# Patient Record
Sex: FEMALE | Race: WHITE | URBAN - METROPOLITAN AREA
[De-identification: names, ages, dates, MRNs, and addresses within clinical notes are randomized per-mention and may not be internally consistent; named-entity substitution may affect disease eponyms.]

---

## 2022-02-03 LAB — MAMMOGRAPHY, EXTERNAL: NORMAL

## 2022-02-13 LAB — PAP SMEAR, EXTERNAL: NORMAL

## 2022-08-05 RX ORDER — ERGOCALCIFEROL 1.25 MG/1
50000 CAPSULE ORAL WEEKLY
Qty: 12 CAPSULE | Refills: 1 | Status: SHIPPED | OUTPATIENT
Start: 2022-08-05

## 2022-08-10 RX ORDER — METOPROLOL SUCCINATE 50 MG/1
TABLET, EXTENDED RELEASE ORAL
Qty: 90 TABLET | Refills: 0 | Status: SHIPPED | OUTPATIENT
Start: 2022-08-10

## 2022-08-10 RX ORDER — ROSUVASTATIN CALCIUM 10 MG/1
TABLET, COATED ORAL
COMMUNITY
Start: 2022-07-21 | End: 2022-08-10 | Stop reason: SDUPTHER

## 2022-08-10 RX ORDER — METOPROLOL SUCCINATE 50 MG/1
TABLET, EXTENDED RELEASE ORAL
COMMUNITY
Start: 2022-07-08 | End: 2022-08-10 | Stop reason: SDUPTHER

## 2022-08-10 RX ORDER — ROSUVASTATIN CALCIUM 10 MG/1
TABLET, COATED ORAL
Qty: 90 TABLET | Refills: 0 | Status: SHIPPED
Start: 2022-08-10 | End: 2022-08-24

## 2022-08-10 NOTE — TELEPHONE ENCOUNTER
Refills requested.   Metoprolol 50mg One tab daily  Crestor  One tab daily (Pt unsure of strength)  Last seen Visit date not found  Next appt 8/24/2022  [address]

## 2022-08-24 ENCOUNTER — OFFICE VISIT (OUTPATIENT)
Dept: PRIMARY CARE CLINIC | Age: 62
End: 2022-08-24
Payer: COMMERCIAL

## 2022-08-24 VITALS
WEIGHT: 116.9 LBS | DIASTOLIC BLOOD PRESSURE: 70 MMHG | SYSTOLIC BLOOD PRESSURE: 126 MMHG | TEMPERATURE: 97.1 F | HEIGHT: 66 IN | HEART RATE: 70 BPM | BODY MASS INDEX: 18.79 KG/M2 | OXYGEN SATURATION: 97 %

## 2022-08-24 DIAGNOSIS — E78.2 MIXED HYPERLIPIDEMIA: ICD-10-CM

## 2022-08-24 DIAGNOSIS — I10 PRIMARY HYPERTENSION: Primary | ICD-10-CM

## 2022-08-24 DIAGNOSIS — E55.9 VITAMIN D DEFICIENCY: ICD-10-CM

## 2022-08-24 PROCEDURE — 99214 OFFICE O/P EST MOD 30 MIN: CPT | Performed by: INTERNAL MEDICINE

## 2022-08-24 RX ORDER — PHENOL 1.4 %
1 AEROSOL, SPRAY (ML) MUCOUS MEMBRANE DAILY
COMMUNITY

## 2022-08-24 RX ORDER — LANOLIN ALCOHOL/MO/W.PET/CERES
1 CREAM (GRAM) TOPICAL DAILY
COMMUNITY

## 2022-08-24 RX ORDER — VITAMIN B COMPLEX
1 CAPSULE ORAL DAILY
COMMUNITY
End: 2022-08-24

## 2022-08-24 RX ORDER — PREDNISOLONE ACETATE 10 MG/ML
1 SUSPENSION/ DROPS OPHTHALMIC DAILY
COMMUNITY

## 2022-08-24 RX ORDER — ASPIRIN 81 MG/1
81 TABLET ORAL DAILY
COMMUNITY

## 2022-08-24 RX ORDER — ASCORBIC ACID 500 MG
1 TABLET ORAL DAILY
COMMUNITY

## 2022-08-24 RX ORDER — ROSUVASTATIN CALCIUM 10 MG/1
1 TABLET, COATED ORAL DAILY
COMMUNITY

## 2022-08-24 RX ORDER — MAGNESIUM 200 MG
1 TABLET ORAL DAILY
COMMUNITY
End: 2022-08-24

## 2022-08-24 SDOH — ECONOMIC STABILITY: FOOD INSECURITY: WITHIN THE PAST 12 MONTHS, THE FOOD YOU BOUGHT JUST DIDN'T LAST AND YOU DIDN'T HAVE MONEY TO GET MORE.: NEVER TRUE

## 2022-08-24 SDOH — ECONOMIC STABILITY: FOOD INSECURITY: WITHIN THE PAST 12 MONTHS, YOU WORRIED THAT YOUR FOOD WOULD RUN OUT BEFORE YOU GOT MONEY TO BUY MORE.: NEVER TRUE

## 2022-08-24 ASSESSMENT — ENCOUNTER SYMPTOMS
BACK PAIN: 0
APNEA: 0
NAUSEA: 0
CONSTIPATION: 0
SORE THROAT: 0
TROUBLE SWALLOWING: 0
EYE ITCHING: 0
VOMITING: 0
PHOTOPHOBIA: 0
WHEEZING: 0
BLOOD IN STOOL: 0
DIARRHEA: 0
SHORTNESS OF BREATH: 0
ABDOMINAL DISTENTION: 0
COUGH: 0
SINUS PAIN: 0
ABDOMINAL PAIN: 0
EYE DISCHARGE: 0

## 2022-08-24 ASSESSMENT — PATIENT HEALTH QUESTIONNAIRE - PHQ9
SUM OF ALL RESPONSES TO PHQ9 QUESTIONS 1 & 2: 0
SUM OF ALL RESPONSES TO PHQ QUESTIONS 1-9: 0
2. FEELING DOWN, DEPRESSED OR HOPELESS: 0
SUM OF ALL RESPONSES TO PHQ QUESTIONS 1-9: 0
1. LITTLE INTEREST OR PLEASURE IN DOING THINGS: 0
SUM OF ALL RESPONSES TO PHQ QUESTIONS 1-9: 0
SUM OF ALL RESPONSES TO PHQ QUESTIONS 1-9: 0

## 2022-08-24 ASSESSMENT — SOCIAL DETERMINANTS OF HEALTH (SDOH): HOW HARD IS IT FOR YOU TO PAY FOR THE VERY BASICS LIKE FOOD, HOUSING, MEDICAL CARE, AND HEATING?: NOT HARD AT ALL

## 2022-08-24 NOTE — PROGRESS NOTES
Rosina Crook (:  1960) is a 60 y.o. female,Established patient, here for evaluation of the following chief complaint(s):  Check-Up      Subjective   SUBJECTIVE/OBJECTIVE:  HPI:  1)Hypertension:  Patient is here for follow up chronic hypertension. This is  generally controlled on current medication regimen. BP Readings from Last 1 Encounters:   22 126/70        Takes meds as directed and tolerates them well. Most recent labs reviewed with patient and are not remarkable. No symptoms from htn standpoint per ROS. Patient is  compliant with lifestyle modifications. Patient does  smoke. Comorbid conditions include none   2)Hyperlipidemia:  Patient is here to follow up regarding chronic hyperlipidemia. This is  generally controlled. Treatment includes rousvastatin. Patient is  compliant with lifestyle modifications. Patient is not a smoker. Most recent labs reviewed with patient today and are not remarkable. Comorbid conditions include none          Review of Systems   Constitutional:  Negative for activity change, appetite change, fever and unexpected weight change. HENT:  Negative for congestion, ear pain, hearing loss, sinus pain, sore throat, tinnitus and trouble swallowing. Eyes:  Negative for photophobia, discharge, itching and visual disturbance. Respiratory:  Negative for apnea, cough, shortness of breath and wheezing. Cardiovascular:  Negative for chest pain, palpitations and leg swelling. Gastrointestinal:  Negative for abdominal distention, abdominal pain, blood in stool, constipation, diarrhea, nausea and vomiting. Endocrine: Negative for cold intolerance, polydipsia and polyuria. Genitourinary:  Negative for difficulty urinating, dysuria, frequency and pelvic pain. Musculoskeletal:  Negative for arthralgias, back pain, joint swelling, myalgias, neck pain and neck stiffness. Skin:  Negative for rash and wound.    Neurological:  Negative for dizziness, tremors, syncope, light-headedness and headaches. Objective   /70   Pulse 70   Temp 97.1 °F (36.2 °C) (Temporal)   Ht 5' 6\" (1.676 m)   Wt 116 lb 14.4 oz (53 kg)   SpO2 97%   BMI 18.87 kg/m²   Current Outpatient Medications   Medication Sig Dispense Refill    aspirin 81 MG EC tablet Take 81 mg by mouth daily      calcium carbonate 600 MG TABS tablet Take 1 tablet by mouth daily      rosuvastatin (CRESTOR) 10 MG tablet Take 1 tablet by mouth daily      vitamin C (ASCORBIC ACID) 500 MG tablet Take 1 tablet by mouth daily      vitamin B-12 (CYANOCOBALAMIN) 1000 MCG tablet Take 1 tablet by mouth daily      prednisoLONE acetate (PRED FORTE) 1 % ophthalmic suspension 1 drop daily      metoprolol succinate (TOPROL XL) 50 MG extended release tablet TAKE 1 TABLET BY MOUTH ONCE DAILY 90 tablet 0    vitamin D (ERGOCALCIFEROL) 1.25 MG (88980 UT) CAPS capsule Take 1 capsule by mouth once a week 12 capsule 1     No current facility-administered medications for this visit. No Known Allergies     History reviewed. No pertinent past medical history. History reviewed. No pertinent surgical history.   Family History   Problem Relation Age of Onset    Heart Disease Mother     Other Father          from surgical complications      Social History     Socioeconomic History    Marital status: Unknown     Spouse name: Not on file    Number of children: Not on file    Years of education: Not on file    Highest education level: Not on file   Occupational History    Not on file   Tobacco Use    Smoking status: Every Day     Packs/day: 0.50     Years: 40.00     Pack years: 20.00     Types: Cigarettes    Smokeless tobacco: Never   Substance and Sexual Activity    Alcohol use: Not on file    Drug use: Not on file    Sexual activity: Not on file   Other Topics Concern    Not on file   Social History Narrative    Not on file     Social Determinants of Health     Financial Resource Strain: Low Risk     Difficulty of Paying Living Expenses: Not hard at all   Food Insecurity: No Food Insecurity    Worried About Running Out of Food in the Last Year: Never true    Ran Out of Food in the Last Year: Never true   Transportation Needs: Not on file   Physical Activity: Not on file   Stress: Not on file   Social Connections: Not on file   Intimate Partner Violence: Not on file   Housing Stability: Not on file      Health Maintenance Due   Topic Date Due    COVID-19 Vaccine (1) Never done    Depression Screen  Never done    HIV screen  Never done    Hepatitis C screen  Never done    DTaP/Tdap/Td vaccine (1 - Tdap) Never done    Cervical cancer screen  Never done    Lipids  Never done    Colorectal Cancer Screen  Never done    Breast cancer screen  Never done    Shingles vaccine (1 of 2) Never done    Low dose CT lung screening  Never done        Physical Exam  Constitutional:       Appearance: Normal appearance. She is normal weight. HENT:      Head: Normocephalic and atraumatic. Right Ear: Tympanic membrane and ear canal normal.      Left Ear: Tympanic membrane and ear canal normal.      Nose: Nose normal.      Mouth/Throat:      Mouth: Mucous membranes are moist.      Pharynx: Oropharynx is clear. Eyes:      Extraocular Movements: Extraocular movements intact. Conjunctiva/sclera: Conjunctivae normal.      Pupils: Pupils are equal, round, and reactive to light. Cardiovascular:      Rate and Rhythm: Normal rate and regular rhythm. Pulses: Normal pulses. Heart sounds: Normal heart sounds. Pulmonary:      Effort: Pulmonary effort is normal.      Breath sounds: Normal breath sounds. Abdominal:      General: Abdomen is flat. Bowel sounds are normal.      Palpations: Abdomen is soft. Musculoskeletal:         General: Normal range of motion.       Right shoulder: Normal.      Left shoulder: Normal.      Right upper arm: Normal.      Left upper arm: Normal.      Right elbow: Normal.      Left elbow: Normal.      Right forearm: Normal.      Left forearm: Normal.      Right wrist: Normal.      Left wrist: Normal.      Right hand: Normal.      Left hand: Normal.      Cervical back: Normal, normal range of motion and neck supple. Lumbar back: Normal.   Skin:     General: Skin is warm and dry. Neurological:      General: No focal deficit present. Mental Status: She is alert and oriented to person, place, and time. Mental status is at baseline. Psychiatric:         Mood and Affect: Mood normal.         Behavior: Behavior normal.         Thought Content: Thought content normal.         Judgment: Judgment normal.             ASSESSMENT/PLAN:  1. Primary hypertension  -     CBC with Auto Differential; Future  2. Mixed hyperlipidemia  -     T4, Free; Future  -     TSH; Future  -     Comprehensive Metabolic Panel; Future  -     Lipid, Fasting; Future  3. Vitamin D deficiency    Return in about 6 months (around 2/24/2023). An electronic signature was used to authenticate this note.     --Brendon Gaspar MD

## 2022-11-10 RX ORDER — METOPROLOL SUCCINATE 50 MG/1
TABLET, EXTENDED RELEASE ORAL
Qty: 90 TABLET | Refills: 0 | Status: SHIPPED
Start: 2022-11-10 | End: 2022-11-10

## 2022-11-10 RX ORDER — METOPROLOL SUCCINATE 50 MG/1
TABLET, EXTENDED RELEASE ORAL
Qty: 90 TABLET | Refills: 0 | Status: SHIPPED | OUTPATIENT
Start: 2022-11-10

## 2022-11-12 ENCOUNTER — HOSPITAL ENCOUNTER (OUTPATIENT)
Age: 62
Discharge: HOME | End: 2022-11-12
Payer: COMMERCIAL

## 2022-11-12 DIAGNOSIS — E78.2 MIXED HYPERLIPIDEMIA: ICD-10-CM

## 2022-11-12 DIAGNOSIS — I10 PRIMARY HYPERTENSION: ICD-10-CM

## 2022-11-12 LAB
ALBUMIN SERPL-MCNC: 4.3 G/DL (ref 3.5–5.2)
ALP BLD-CCNC: 87 U/L (ref 35–104)
ALT SERPL-CCNC: 13 U/L (ref 0–32)
ANION GAP SERPL CALCULATED.3IONS-SCNC: 7 MMOL/L (ref 7–16)
AST SERPL-CCNC: 16 U/L (ref 0–31)
BASOPHILS ABSOLUTE: 0.04 E9/L (ref 0–0.2)
BASOPHILS RELATIVE PERCENT: 0.6 % (ref 0–2)
BILIRUB SERPL-MCNC: 0.8 MG/DL (ref 0–1.2)
BUN BLDV-MCNC: 13 MG/DL (ref 6–23)
CALCIUM SERPL-MCNC: 9.3 MG/DL (ref 8.6–10.2)
CHLORIDE BLD-SCNC: 102 MMOL/L (ref 98–107)
CHOLESTEROL, FASTING: 160 MG/DL (ref 0–199)
CO2: 29 MMOL/L (ref 22–29)
CREAT SERPL-MCNC: 0.8 MG/DL (ref 0.5–1)
EOSINOPHILS ABSOLUTE: 0.11 E9/L (ref 0.05–0.5)
EOSINOPHILS RELATIVE PERCENT: 1.6 % (ref 0–6)
GFR SERPL CREATININE-BSD FRML MDRD: >60 ML/MIN/1.73
GLUCOSE BLD-MCNC: 105 MG/DL (ref 74–99)
HCT VFR BLD CALC: 42.6 % (ref 34–48)
HDLC SERPL-MCNC: 49 MG/DL
HEMOGLOBIN: 13.8 G/DL (ref 11.5–15.5)
IMMATURE GRANULOCYTES #: 0.03 E9/L
IMMATURE GRANULOCYTES %: 0.4 % (ref 0–5)
LDL CHOLESTEROL CALCULATED: 96 MG/DL (ref 0–99)
LYMPHOCYTES ABSOLUTE: 1.17 E9/L (ref 1.5–4)
LYMPHOCYTES RELATIVE PERCENT: 17.5 % (ref 20–42)
MCH RBC QN AUTO: 31 PG (ref 26–35)
MCHC RBC AUTO-ENTMCNC: 32.4 % (ref 32–34.5)
MCV RBC AUTO: 95.7 FL (ref 80–99.9)
MONOCYTES ABSOLUTE: 0.58 E9/L (ref 0.1–0.95)
MONOCYTES RELATIVE PERCENT: 8.7 % (ref 2–12)
NEUTROPHILS ABSOLUTE: 4.77 E9/L (ref 1.8–7.3)
NEUTROPHILS RELATIVE PERCENT: 71.2 % (ref 43–80)
PDW BLD-RTO: 12.6 FL (ref 11.5–15)
PLATELET # BLD: 233 E9/L (ref 130–450)
PMV BLD AUTO: 9.5 FL (ref 7–12)
POTASSIUM SERPL-SCNC: 4.5 MMOL/L (ref 3.5–5)
RBC # BLD: 4.45 E12/L (ref 3.5–5.5)
SODIUM BLD-SCNC: 138 MMOL/L (ref 132–146)
T4 FREE: 1.15 NG/DL (ref 0.93–1.7)
TOTAL PROTEIN: 7.3 G/DL (ref 6.4–8.3)
TRIGLYCERIDE, FASTING: 77 MG/DL (ref 0–149)
TSH SERPL DL<=0.05 MIU/L-ACNC: 1.4 UIU/ML (ref 0.27–4.2)
VLDLC SERPL CALC-MCNC: 15 MG/DL
WBC # BLD: 6.7 E9/L (ref 4.5–11.5)

## 2022-11-12 PROCEDURE — 80061 LIPID PANEL: CPT

## 2022-11-12 PROCEDURE — 85025 COMPLETE CBC W/AUTO DIFF WBC: CPT

## 2022-11-12 PROCEDURE — 84443 ASSAY THYROID STIM HORMONE: CPT

## 2022-11-12 PROCEDURE — 36415 COLL VENOUS BLD VENIPUNCTURE: CPT

## 2022-11-12 PROCEDURE — 80053 COMPREHEN METABOLIC PANEL: CPT

## 2022-11-12 PROCEDURE — 84439 ASSAY OF FREE THYROXINE: CPT

## 2022-11-21 RX ORDER — ROSUVASTATIN CALCIUM 10 MG/1
10 TABLET, COATED ORAL DAILY
Qty: 90 TABLET | Refills: 0 | Status: SHIPPED | OUTPATIENT
Start: 2022-11-21 | End: 2023-02-19

## 2022-12-01 ENCOUNTER — TELEPHONE (OUTPATIENT)
Dept: PRIMARY CARE CLINIC | Age: 62
End: 2022-12-01

## 2022-12-01 NOTE — TELEPHONE ENCOUNTER
Pt tested positive for covid last night. She cough and congestion. Running nose, achey, and nausea. Is there anything besides the regular vitamin list?  Please advise. Per you she is taking advil cold and sinus. [address] [facility].

## 2022-12-02 ENCOUNTER — TELEPHONE (OUTPATIENT)
Dept: PRIMARY CARE CLINIC | Age: 62
End: 2022-12-02

## 2023-01-16 ASSESSMENT — ENCOUNTER SYMPTOMS
SHORTNESS OF BREATH: 0
PHOTOPHOBIA: 0
ABDOMINAL PAIN: 0
BACK PAIN: 0
SINUS PAIN: 0
EYE DISCHARGE: 0
DIARRHEA: 0
CONSTIPATION: 0
BLOOD IN STOOL: 0
APNEA: 0
NAUSEA: 0
COUGH: 0
SORE THROAT: 0
VOMITING: 0
WHEEZING: 0
ABDOMINAL DISTENTION: 0
EYE ITCHING: 0
TROUBLE SWALLOWING: 0

## 2023-01-16 NOTE — PROGRESS NOTES
Valeria Coffey (:  1960) is a 60 y.o. female,Established patient, here for evaluation of the following chief complaint(s):  Follow-up (No Complaints. )      Subjective   SUBJECTIVE/OBJECTIVE:  HPI:  1)Hypertension:  Patient is here for follow up chronic hypertension. This is  generally controlled on current medication regimen. BP Readings from Last 1 Encounters:   23 110/68        Takes meds as directed and tolerates them well. Most recent labs reviewed with patient and are not remarkable. No symptoms from htn standpoint per ROS. Patient is  compliant with lifestyle modifications. Patient does not smoke. Comorbid conditions include obesity  2)Hyperlipidemia:  Patient is here to follow up regarding chronic hyperlipidemia. This is  generally controlled. Treatment includes simvastatin. Patient is  compliant with lifestyle modifications. Patient is not a smoker. Most recent labs reviewed with patient today and are not remarkable. Comorbid conditions include obesity. 3)Vitamin D deficiency     Review of Systems   Constitutional:  Negative for activity change, appetite change, fever and unexpected weight change. HENT:  Negative for congestion, ear pain, hearing loss, sinus pain, sore throat, tinnitus and trouble swallowing. Eyes:  Negative for photophobia, discharge, itching and visual disturbance. Respiratory:  Negative for apnea, cough, shortness of breath and wheezing. Cardiovascular:  Negative for chest pain, palpitations and leg swelling. Gastrointestinal:  Negative for abdominal distention, abdominal pain, blood in stool, constipation, diarrhea, nausea and vomiting. Endocrine: Negative for cold intolerance, polydipsia and polyuria. Genitourinary:  Negative for difficulty urinating, dysuria, frequency and pelvic pain. Musculoskeletal:  Negative for arthralgias, back pain, joint swelling, myalgias, neck pain and neck stiffness. Skin:  Negative for rash and wound.   Neurological:  Negative for dizziness, tremors, syncope, light-headedness and headaches.   CBC with Differential:    Lab Results   Component Value Date/Time    WBC 6.7 11/12/2022 10:55 AM    RBC 4.45 11/12/2022 10:55 AM    HGB 13.8 11/12/2022 10:55 AM    HCT 42.6 11/12/2022 10:55 AM     11/12/2022 10:55 AM    MCV 95.7 11/12/2022 10:55 AM    MCH 31.0 11/12/2022 10:55 AM    MCHC 32.4 11/12/2022 10:55 AM    RDW 12.6 11/12/2022 10:55 AM    LYMPHOPCT 17.5 11/12/2022 10:55 AM    MONOPCT 8.7 11/12/2022 10:55 AM    BASOPCT 0.6 11/12/2022 10:55 AM    MONOSABS 0.58 11/12/2022 10:55 AM    LYMPHSABS 1.17 11/12/2022 10:55 AM    EOSABS 0.11 11/12/2022 10:55 AM    BASOSABS 0.04 11/12/2022 10:55 AM     CMP:    Lab Results   Component Value Date/Time     11/12/2022 10:55 AM    K 4.5 11/12/2022 10:55 AM     11/12/2022 10:55 AM    CO2 29 11/12/2022 10:55 AM    BUN 13 11/12/2022 10:55 AM    CREATININE 0.8 11/12/2022 10:55 AM    LABGLOM >60 11/12/2022 10:55 AM    GLUCOSE 105 11/12/2022 10:55 AM    PROT 7.3 11/12/2022 10:55 AM    LABALBU 4.3 11/12/2022 10:55 AM    CALCIUM 9.3 11/12/2022 10:55 AM    BILITOT 0.8 11/12/2022 10:55 AM    ALKPHOS 87 11/12/2022 10:55 AM    AST 16 11/12/2022 10:55 AM    ALT 13 11/12/2022 10:55 AM     Lipid: No results found for: CHOL  No results found for: TRIG  Lab Results   Component Value Date    HDL 49 11/12/2022     Lab Results   Component Value Date    LDLCALC 96 11/12/2022     Lab Results   Component Value Date    LABVLDL 15 11/12/2022     No results found for: CHOLHDLRATIO  TSH:    Lab Results   Component Value Date/Time    TSH 1.400 11/12/2022 10:55 AM     Free T4:   Lab Results   Component Value Date/Time    T4FREE 1.15 11/12/2022 10:55 AM            Objective   /68   Pulse 66   Temp 98 °F (36.7 °C) (Temporal)   Ht 5' 6\" (1.676 m)   Wt 114 lb 11.2 oz (52 kg)   SpO2 98%   BMI 18.51 kg/m²   Current Outpatient Medications   Medication Sig Dispense Refill    metoprolol  succinate (TOPROL XL) 50 MG extended release tablet TAKE 1 TABLET BY MOUTH ONCE DAILY 90 tablet 0    rosuvastatin (CRESTOR) 10 MG tablet Take 1 tablet by mouth daily 90 tablet 0    vitamin D (ERGOCALCIFEROL) 1.25 MG (88544 UT) CAPS capsule Take 1 capsule by mouth once a week 12 capsule 0    aspirin 81 MG EC tablet Take 81 mg by mouth daily      calcium carbonate 600 MG TABS tablet Take 1 tablet by mouth daily      vitamin C (ASCORBIC ACID) 500 MG tablet Take 1 tablet by mouth daily      vitamin B-12 (CYANOCOBALAMIN) 1000 MCG tablet Take 1 tablet by mouth daily       No current facility-administered medications for this visit. No Known Allergies     History reviewed. No pertinent past medical history. History reviewed. No pertinent surgical history.   Family History   Problem Relation Age of Onset    Heart Disease Mother     Other Father          from surgical complications      Social History     Socioeconomic History    Marital status: Unknown     Spouse name: Not on file    Number of children: Not on file    Years of education: Not on file    Highest education level: Not on file   Occupational History    Not on file   Tobacco Use    Smoking status: Every Day     Packs/day: 0.50     Years: 40.00     Pack years: 20.00     Types: Cigarettes    Smokeless tobacco: Never   Substance and Sexual Activity    Alcohol use: Not Currently    Drug use: Never    Sexual activity: Not on file   Other Topics Concern    Not on file   Social History Narrative    Not on file     Social Determinants of Health     Financial Resource Strain: Low Risk     Difficulty of Paying Living Expenses: Not hard at all   Food Insecurity: No Food Insecurity    Worried About Running Out of Food in the Last Year: Never true    Ran Out of Food in the Last Year: Never true   Transportation Needs: Not on file   Physical Activity: Not on file   Stress: Not on file   Social Connections: Not on file   Intimate Partner Violence: Not on file   Housing Stability: Not on file      Health Maintenance Due   Topic Date Due    Pneumococcal 0-64 years Vaccine (1 - PCV) Never done    Hepatitis C screen  Never done    DTaP/Tdap/Td vaccine (1 - Tdap) Never done    Shingles vaccine (1 of 2) Never done    Low dose CT lung screening  Never done    COVID-19 Vaccine (3 - Booster for [place] series) 12/23/2021    Flu vaccine (1) Never done        Physical Exam  Constitutional:       Appearance: Normal appearance. She is normal weight. HENT:      Head: Normocephalic and atraumatic. Right Ear: Tympanic membrane and ear canal normal.      Left Ear: Tympanic membrane and ear canal normal.      Nose: Nose normal.      Mouth/Throat:      Mouth: Mucous membranes are moist.      Pharynx: Oropharynx is clear. Eyes:      Extraocular Movements: Extraocular movements intact. Conjunctiva/sclera: Conjunctivae normal.      Pupils: Pupils are equal, round, and reactive to light. Cardiovascular:      Rate and Rhythm: Normal rate and regular rhythm. Pulses: Normal pulses. Heart sounds: Normal heart sounds. Pulmonary:      Effort: Pulmonary effort is normal.      Breath sounds: Normal breath sounds. Abdominal:      General: Abdomen is flat. Bowel sounds are normal.      Palpations: Abdomen is soft. Musculoskeletal:         General: Normal range of motion. Right shoulder: Normal.      Left shoulder: Normal.      Right upper arm: Normal.      Left upper arm: Normal.      Right elbow: Normal.      Left elbow: Normal.      Right forearm: Normal.      Left forearm: Normal.      Right wrist: Normal.      Left wrist: Normal.      Right hand: Normal.      Left hand: Normal.      Cervical back: Normal, normal range of motion and neck supple. Lumbar back: Normal.   Skin:     General: Skin is warm and dry. Neurological:      General: No focal deficit present. Mental Status: She is alert and oriented to person, place, and time. Mental status is at baseline. Psychiatric:         Mood and Affect: Mood normal.         Behavior: Behavior normal.         Thought Content: Thought content normal.         Judgment: Judgment normal.             ASSESSMENT/PLAN:  1. Primary hypertension  2. Mixed hyperlipidemia  -     Comprehensive Metabolic Panel; Future  -     Lipid, Fasting; Future  3. Vitamin D deficiency  4. Encounter for screening mammogram for malignant neoplasm of breast  -     FABIOLA NEL DIGITAL SCREEN BILATERAL; Future  5. Tobacco abuse  -     CT LUNG SCREENING  6. Screening due  -     Hepatitis C Antibody; Future    No follow-ups on file. An electronic signature was used to authenticate this note.     --Nela Hubbard MD

## 2023-01-20 RX ORDER — ERGOCALCIFEROL 1.25 MG/1
50000 CAPSULE ORAL WEEKLY
Qty: 12 CAPSULE | Refills: 0 | Status: SHIPPED | OUTPATIENT
Start: 2023-01-20

## 2023-01-31 ENCOUNTER — OFFICE VISIT (OUTPATIENT)
Dept: PRIMARY CARE CLINIC | Age: 63
End: 2023-01-31
Payer: COMMERCIAL

## 2023-01-31 VITALS
BODY MASS INDEX: 18.43 KG/M2 | DIASTOLIC BLOOD PRESSURE: 68 MMHG | OXYGEN SATURATION: 98 % | SYSTOLIC BLOOD PRESSURE: 110 MMHG | TEMPERATURE: 98 F | HEART RATE: 66 BPM | HEIGHT: 66 IN | WEIGHT: 114.7 LBS

## 2023-01-31 DIAGNOSIS — Z72.0 TOBACCO ABUSE: ICD-10-CM

## 2023-01-31 DIAGNOSIS — E78.2 MIXED HYPERLIPIDEMIA: ICD-10-CM

## 2023-01-31 DIAGNOSIS — E55.9 VITAMIN D DEFICIENCY: ICD-10-CM

## 2023-01-31 DIAGNOSIS — Z12.31 ENCOUNTER FOR SCREENING MAMMOGRAM FOR MALIGNANT NEOPLASM OF BREAST: ICD-10-CM

## 2023-01-31 DIAGNOSIS — Z13.9 SCREENING DUE: ICD-10-CM

## 2023-01-31 DIAGNOSIS — I10 PRIMARY HYPERTENSION: Primary | ICD-10-CM

## 2023-01-31 PROCEDURE — G8484 FLU IMMUNIZE NO ADMIN: HCPCS | Performed by: INTERNAL MEDICINE

## 2023-01-31 PROCEDURE — 4004F PT TOBACCO SCREEN RCVD TLK: CPT | Performed by: INTERNAL MEDICINE

## 2023-01-31 PROCEDURE — 99214 OFFICE O/P EST MOD 30 MIN: CPT | Performed by: INTERNAL MEDICINE

## 2023-01-31 PROCEDURE — 3078F DIAST BP <80 MM HG: CPT | Performed by: INTERNAL MEDICINE

## 2023-01-31 PROCEDURE — 3017F COLORECTAL CA SCREEN DOC REV: CPT | Performed by: INTERNAL MEDICINE

## 2023-01-31 PROCEDURE — 3074F SYST BP LT 130 MM HG: CPT | Performed by: INTERNAL MEDICINE

## 2023-01-31 PROCEDURE — G8427 DOCREV CUR MEDS BY ELIG CLIN: HCPCS | Performed by: INTERNAL MEDICINE

## 2023-01-31 PROCEDURE — G8420 CALC BMI NORM PARAMETERS: HCPCS | Performed by: INTERNAL MEDICINE

## 2023-01-31 RX ORDER — ERGOCALCIFEROL 1.25 MG/1
50000 CAPSULE ORAL WEEKLY
Qty: 12 CAPSULE | Refills: 0 | Status: SHIPPED | OUTPATIENT
Start: 2023-01-31

## 2023-01-31 RX ORDER — ROSUVASTATIN CALCIUM 10 MG/1
10 TABLET, COATED ORAL DAILY
Qty: 90 TABLET | Refills: 0 | Status: SHIPPED | OUTPATIENT
Start: 2023-01-31 | End: 2023-05-01

## 2023-01-31 RX ORDER — METOPROLOL SUCCINATE 50 MG/1
TABLET, EXTENDED RELEASE ORAL
Qty: 90 TABLET | Refills: 0 | Status: SHIPPED | OUTPATIENT
Start: 2023-01-31

## 2023-01-31 ASSESSMENT — PATIENT HEALTH QUESTIONNAIRE - PHQ9
SUM OF ALL RESPONSES TO PHQ QUESTIONS 1-9: 0
SUM OF ALL RESPONSES TO PHQ QUESTIONS 1-9: 0
1. LITTLE INTEREST OR PLEASURE IN DOING THINGS: 0
SUM OF ALL RESPONSES TO PHQ QUESTIONS 1-9: 0
SUM OF ALL RESPONSES TO PHQ QUESTIONS 1-9: 0
2. FEELING DOWN, DEPRESSED OR HOPELESS: 0
SUM OF ALL RESPONSES TO PHQ9 QUESTIONS 1 & 2: 0

## 2023-05-08 RX ORDER — METOPROLOL SUCCINATE 50 MG/1
TABLET, EXTENDED RELEASE ORAL
Qty: 90 TABLET | Refills: 0 | Status: SHIPPED | OUTPATIENT
Start: 2023-05-08

## 2023-05-08 RX ORDER — METOPROLOL SUCCINATE 50 MG/1
TABLET, EXTENDED RELEASE ORAL
Qty: 30 TABLET | Refills: 10 | OUTPATIENT
Start: 2023-05-08

## 2023-05-24 RX ORDER — ROSUVASTATIN CALCIUM 10 MG/1
10 TABLET, COATED ORAL DAILY
Qty: 90 TABLET | Refills: 0 | Status: SHIPPED | OUTPATIENT
Start: 2023-05-24 | End: 2023-08-22

## 2023-07-07 RX ORDER — ERGOCALCIFEROL 1.25 MG/1
50000 CAPSULE ORAL WEEKLY
Qty: 12 CAPSULE | Refills: 1 | Status: SHIPPED | OUTPATIENT
Start: 2023-07-07

## 2023-07-10 ASSESSMENT — ENCOUNTER SYMPTOMS
SINUS PAIN: 0
EYE ITCHING: 0
VOMITING: 0
APNEA: 0
SORE THROAT: 0
WHEEZING: 0
EYE DISCHARGE: 0
BACK PAIN: 0
COUGH: 0
TROUBLE SWALLOWING: 0
CONSTIPATION: 0
PHOTOPHOBIA: 0
SHORTNESS OF BREATH: 0
NAUSEA: 0
DIARRHEA: 0
ABDOMINAL PAIN: 0
ABDOMINAL DISTENTION: 0
BLOOD IN STOOL: 0

## 2023-07-10 NOTE — PROGRESS NOTES
days, Disp-14 tablet, R-0Normal  2. Mixed hyperlipidemia  -     TSH; Future  -     Comprehensive Metabolic Panel; Future  -     Lipid, Fasting; Future  3. Vitamin D deficiency  Pt encouraged to do mammogram and ct scan of chest    No follow-ups on file. An electronic signature was used to authenticate this note.     --Bradley Cummings MD

## 2023-07-25 DIAGNOSIS — Z13.9 SCREENING DUE: ICD-10-CM

## 2023-07-25 DIAGNOSIS — E78.2 MIXED HYPERLIPIDEMIA: ICD-10-CM

## 2023-07-25 LAB
ALBUMIN SERPL-MCNC: 4.3 G/DL (ref 3.5–5.2)
ALP BLD-CCNC: 82 U/L (ref 35–104)
ALT SERPL-CCNC: 13 U/L (ref 0–32)
ANION GAP SERPL CALCULATED.3IONS-SCNC: 13 MMOL/L (ref 7–16)
AST SERPL-CCNC: 25 U/L (ref 0–31)
BILIRUB SERPL-MCNC: 0.8 MG/DL (ref 0–1.2)
BUN BLDV-MCNC: 11 MG/DL (ref 6–23)
CALCIUM SERPL-MCNC: 9.2 MG/DL (ref 8.6–10.2)
CHLORIDE BLD-SCNC: 102 MMOL/L (ref 98–107)
CHOLESTEROL, FASTING: 169 MG/DL
CO2: 24 MMOL/L (ref 22–29)
CREAT SERPL-MCNC: 0.8 MG/DL (ref 0.5–1)
GFR SERPL CREATININE-BSD FRML MDRD: >60 ML/MIN/1.73M2
GLUCOSE BLD-MCNC: 96 MG/DL (ref 74–99)
HDLC SERPL-MCNC: 47 MG/DL
LDL CHOLESTEROL: 104 MG/DL
POTASSIUM SERPL-SCNC: 4.4 MMOL/L (ref 3.5–5)
SODIUM BLD-SCNC: 139 MMOL/L (ref 132–146)
TOTAL PROTEIN: 7.6 G/DL (ref 6.4–8.3)
TRIGLYCERIDE, FASTING: 92 MG/DL
VLDLC SERPL CALC-MCNC: 18 MG/DL

## 2023-07-26 LAB — HEPATITIS C ANTIBODY: NONREACTIVE

## 2023-08-08 ENCOUNTER — OFFICE VISIT (OUTPATIENT)
Dept: PRIMARY CARE CLINIC | Age: 63
End: 2023-08-08
Payer: COMMERCIAL

## 2023-08-08 VITALS
WEIGHT: 116 LBS | OXYGEN SATURATION: 97 % | DIASTOLIC BLOOD PRESSURE: 80 MMHG | TEMPERATURE: 97.8 F | BODY MASS INDEX: 18.64 KG/M2 | HEART RATE: 63 BPM | HEIGHT: 66 IN | SYSTOLIC BLOOD PRESSURE: 138 MMHG

## 2023-08-08 DIAGNOSIS — E78.2 MIXED HYPERLIPIDEMIA: ICD-10-CM

## 2023-08-08 DIAGNOSIS — E55.9 VITAMIN D DEFICIENCY: ICD-10-CM

## 2023-08-08 DIAGNOSIS — R30.0 DYSURIA: Primary | ICD-10-CM

## 2023-08-08 LAB
BILIRUBIN, POC: ABNORMAL
BLOOD URINE, POC: ABNORMAL
CLARITY, POC: ABNORMAL
COLOR, POC: ABNORMAL
GLUCOSE URINE, POC: ABNORMAL
KETONES, POC: ABNORMAL
LEUKOCYTE EST, POC: ABNORMAL
NITRITE, POC: ABNORMAL
PH, POC: 6
PROTEIN, POC: ABNORMAL
SPECIFIC GRAVITY, POC: 1.01
UROBILINOGEN, POC: ABNORMAL

## 2023-08-08 PROCEDURE — G8427 DOCREV CUR MEDS BY ELIG CLIN: HCPCS | Performed by: INTERNAL MEDICINE

## 2023-08-08 PROCEDURE — 99214 OFFICE O/P EST MOD 30 MIN: CPT | Performed by: INTERNAL MEDICINE

## 2023-08-08 PROCEDURE — 3017F COLORECTAL CA SCREEN DOC REV: CPT | Performed by: INTERNAL MEDICINE

## 2023-08-08 PROCEDURE — 4004F PT TOBACCO SCREEN RCVD TLK: CPT | Performed by: INTERNAL MEDICINE

## 2023-08-08 PROCEDURE — 3075F SYST BP GE 130 - 139MM HG: CPT | Performed by: INTERNAL MEDICINE

## 2023-08-08 PROCEDURE — G8420 CALC BMI NORM PARAMETERS: HCPCS | Performed by: INTERNAL MEDICINE

## 2023-08-08 PROCEDURE — 81002 URINALYSIS NONAUTO W/O SCOPE: CPT | Performed by: INTERNAL MEDICINE

## 2023-08-08 PROCEDURE — 3079F DIAST BP 80-89 MM HG: CPT | Performed by: INTERNAL MEDICINE

## 2023-08-08 RX ORDER — ROSUVASTATIN CALCIUM 10 MG/1
10 TABLET, COATED ORAL DAILY
Qty: 90 TABLET | Refills: 1 | Status: SHIPPED | OUTPATIENT
Start: 2023-08-08

## 2023-08-08 RX ORDER — METOPROLOL SUCCINATE 50 MG/1
TABLET, EXTENDED RELEASE ORAL
Qty: 90 TABLET | Refills: 1 | Status: SHIPPED | OUTPATIENT
Start: 2023-08-08

## 2023-08-08 RX ORDER — CIPROFLOXACIN 250 MG/1
250 TABLET, FILM COATED ORAL 2 TIMES DAILY
Qty: 14 TABLET | Refills: 0 | Status: SHIPPED | OUTPATIENT
Start: 2023-08-08 | End: 2023-08-15

## 2023-08-08 RX ORDER — ERGOCALCIFEROL 1.25 MG/1
50000 CAPSULE ORAL WEEKLY
Qty: 12 CAPSULE | Refills: 1 | Status: SHIPPED | OUTPATIENT
Start: 2023-08-08

## 2023-08-08 SDOH — ECONOMIC STABILITY: INCOME INSECURITY: HOW HARD IS IT FOR YOU TO PAY FOR THE VERY BASICS LIKE FOOD, HOUSING, MEDICAL CARE, AND HEATING?: NOT HARD AT ALL

## 2023-08-08 SDOH — ECONOMIC STABILITY: FOOD INSECURITY: WITHIN THE PAST 12 MONTHS, YOU WORRIED THAT YOUR FOOD WOULD RUN OUT BEFORE YOU GOT MONEY TO BUY MORE.: NEVER TRUE

## 2023-08-08 SDOH — ECONOMIC STABILITY: FOOD INSECURITY: WITHIN THE PAST 12 MONTHS, THE FOOD YOU BOUGHT JUST DIDN'T LAST AND YOU DIDN'T HAVE MONEY TO GET MORE.: NEVER TRUE

## 2023-08-08 SDOH — ECONOMIC STABILITY: HOUSING INSECURITY
IN THE LAST 12 MONTHS, WAS THERE A TIME WHEN YOU DID NOT HAVE A STEADY PLACE TO SLEEP OR SLEPT IN A SHELTER (INCLUDING NOW)?: NO

## 2023-08-31 ENCOUNTER — TELEPHONE (OUTPATIENT)
Dept: PRIMARY CARE CLINIC | Age: 63
End: 2023-08-31

## 2023-08-31 DIAGNOSIS — N30.01 ACUTE CYSTITIS WITH HEMATURIA: ICD-10-CM

## 2023-08-31 DIAGNOSIS — N30.01 ACUTE CYSTITIS WITH HEMATURIA: Primary | ICD-10-CM

## 2023-08-31 NOTE — TELEPHONE ENCOUNTER
Some blood in urine was treated for uti and and wanted to know if she can have another antibiotic or what she should do

## 2023-09-03 LAB
CULTURE: ABNORMAL
SPECIMEN DESCRIPTION: ABNORMAL

## 2023-09-14 ENCOUNTER — TELEPHONE (OUTPATIENT)
Dept: PRIMARY CARE CLINIC | Age: 63
End: 2023-09-14

## 2023-09-14 RX ORDER — CIPROFLOXACIN 250 MG/1
250 TABLET, FILM COATED ORAL 2 TIMES DAILY
Qty: 14 TABLET | Refills: 0 | Status: SHIPPED | OUTPATIENT
Start: 2023-09-14 | End: 2023-09-21

## 2023-10-04 DIAGNOSIS — Z72.0 TOBACCO ABUSE: Primary | ICD-10-CM

## 2023-10-26 ENCOUNTER — HOSPITAL ENCOUNTER (OUTPATIENT)
Age: 63
Discharge: HOME | End: 2023-10-26
Attending: INTERNAL MEDICINE
Payer: COMMERCIAL

## 2023-10-26 ENCOUNTER — HOSPITAL ENCOUNTER (OUTPATIENT)
Dept: MAMMOGRAPHY | Age: 63
Discharge: HOME | End: 2023-10-28
Attending: INTERNAL MEDICINE
Payer: COMMERCIAL

## 2023-10-26 ENCOUNTER — HOSPITAL ENCOUNTER (OUTPATIENT)
Dept: CT IMAGING | Age: 63
Discharge: HOME | End: 2023-10-26
Attending: INTERNAL MEDICINE
Payer: COMMERCIAL

## 2023-10-26 VITALS — WEIGHT: 110 LBS | BODY MASS INDEX: 17.68 KG/M2 | HEIGHT: 66 IN

## 2023-10-26 DIAGNOSIS — E78.2 MIXED HYPERLIPIDEMIA: ICD-10-CM

## 2023-10-26 DIAGNOSIS — Z12.31 ENCOUNTER FOR SCREENING MAMMOGRAM FOR MALIGNANT NEOPLASM OF BREAST: ICD-10-CM

## 2023-10-26 DIAGNOSIS — Z72.0 TOBACCO ABUSE: ICD-10-CM

## 2023-10-26 LAB
ALBUMIN SERPL-MCNC: 4.4 G/DL (ref 3.5–5.2)
ALP SERPL-CCNC: 89 U/L (ref 35–104)
ALT SERPL-CCNC: 14 U/L (ref 0–32)
ANION GAP SERPL CALCULATED.3IONS-SCNC: 8 MMOL/L (ref 7–16)
AST SERPL-CCNC: 20 U/L (ref 0–31)
BILIRUB SERPL-MCNC: 0.9 MG/DL (ref 0–1.2)
BUN SERPL-MCNC: 13 MG/DL (ref 6–23)
CALCIUM SERPL-MCNC: 9.7 MG/DL (ref 8.6–10.2)
CHLORIDE SERPL-SCNC: 104 MMOL/L (ref 98–107)
CHOLEST SERPL-MCNC: 163 MG/DL
CO2 SERPL-SCNC: 29 MMOL/L (ref 22–29)
CREAT SERPL-MCNC: 0.8 MG/DL (ref 0.5–1)
GFR SERPL CREATININE-BSD FRML MDRD: >60 ML/MIN/1.73M2
GLUCOSE SERPL-MCNC: 97 MG/DL (ref 74–99)
HDLC SERPL-MCNC: 50 MG/DL
LDLC SERPL CALC-MCNC: 90 MG/DL
POTASSIUM SERPL-SCNC: 4.3 MMOL/L (ref 3.5–5)
PROT SERPL-MCNC: 7.7 G/DL (ref 6.4–8.3)
SODIUM SERPL-SCNC: 141 MMOL/L (ref 132–146)
TRIGL SERPL-MCNC: 115 MG/DL
TSH SERPL DL<=0.05 MIU/L-ACNC: 2.19 UIU/ML (ref 0.27–4.2)
VLDLC SERPL CALC-MCNC: 23 MG/DL

## 2023-10-26 PROCEDURE — 80061 LIPID PANEL: CPT

## 2023-10-26 PROCEDURE — 36415 COLL VENOUS BLD VENIPUNCTURE: CPT

## 2023-10-26 PROCEDURE — 71271 CT THORAX LUNG CANCER SCR C-: CPT

## 2023-10-26 PROCEDURE — 77063 BREAST TOMOSYNTHESIS BI: CPT

## 2023-10-26 PROCEDURE — 80053 COMPREHEN METABOLIC PANEL: CPT

## 2023-10-26 PROCEDURE — 84443 ASSAY THYROID STIM HORMONE: CPT

## 2023-10-30 ENCOUNTER — TELEPHONE (OUTPATIENT)
Dept: CASE MANAGEMENT | Age: 63
End: 2023-10-30

## 2023-10-30 NOTE — TELEPHONE ENCOUNTER
No call, encounter opened to process CT Lung Screening. CT Lung Screen: 10/26/2023    IMPRESSION:  1. There is no pulmonary infiltrate, mass or suspicious pulmonary nodule. 2. Emphysematous changes     LUNG RADS:  Lung-RADS 1 - Negative ()     Management:  12 month screening LDCT     RECOMMENDATIONS:  If you would like to register your patient with the [facility], please contact the Nurse Navigator at  [phone]. Pack years: 20    Social History     Tobacco Use  Smoking Status: Current Every Day Smoker    Start Date:    Quit Date:    Types: Cigarettes   Packs/Day: 0.5   Years: 36   Pack Years: 21   Smokeless Tobacco: Never         Results letter sent to patient via my chart or mailed.      [address]

## 2023-11-14 ENCOUNTER — OFFICE VISIT (OUTPATIENT)
Dept: PRIMARY CARE CLINIC | Age: 63
End: 2023-11-14
Payer: COMMERCIAL

## 2023-11-14 VITALS
OXYGEN SATURATION: 100 % | BODY MASS INDEX: 18.48 KG/M2 | WEIGHT: 115 LBS | TEMPERATURE: 97.6 F | SYSTOLIC BLOOD PRESSURE: 136 MMHG | HEART RATE: 58 BPM | DIASTOLIC BLOOD PRESSURE: 80 MMHG | HEIGHT: 66 IN

## 2023-11-14 DIAGNOSIS — J43.1 PANLOBULAR EMPHYSEMA (HCC): ICD-10-CM

## 2023-11-14 DIAGNOSIS — E55.9 VITAMIN D DEFICIENCY: ICD-10-CM

## 2023-11-14 DIAGNOSIS — E78.2 MIXED HYPERLIPIDEMIA: ICD-10-CM

## 2023-11-14 DIAGNOSIS — I10 PRIMARY HYPERTENSION: Primary | ICD-10-CM

## 2023-11-14 PROCEDURE — G8420 CALC BMI NORM PARAMETERS: HCPCS | Performed by: INTERNAL MEDICINE

## 2023-11-14 PROCEDURE — 3023F SPIROM DOC REV: CPT | Performed by: INTERNAL MEDICINE

## 2023-11-14 PROCEDURE — G8484 FLU IMMUNIZE NO ADMIN: HCPCS | Performed by: INTERNAL MEDICINE

## 2023-11-14 PROCEDURE — G8427 DOCREV CUR MEDS BY ELIG CLIN: HCPCS | Performed by: INTERNAL MEDICINE

## 2023-11-14 PROCEDURE — 3075F SYST BP GE 130 - 139MM HG: CPT | Performed by: INTERNAL MEDICINE

## 2023-11-14 PROCEDURE — 3017F COLORECTAL CA SCREEN DOC REV: CPT | Performed by: INTERNAL MEDICINE

## 2023-11-14 PROCEDURE — 4004F PT TOBACCO SCREEN RCVD TLK: CPT | Performed by: INTERNAL MEDICINE

## 2023-11-14 PROCEDURE — 3079F DIAST BP 80-89 MM HG: CPT | Performed by: INTERNAL MEDICINE

## 2023-11-14 PROCEDURE — 99214 OFFICE O/P EST MOD 30 MIN: CPT | Performed by: INTERNAL MEDICINE

## 2023-11-14 RX ORDER — ERGOCALCIFEROL 1.25 MG/1
50000 CAPSULE ORAL WEEKLY
Qty: 12 CAPSULE | Refills: 1 | Status: SHIPPED | OUTPATIENT
Start: 2023-11-14

## 2023-11-14 RX ORDER — METOPROLOL SUCCINATE 50 MG/1
TABLET, EXTENDED RELEASE ORAL
Qty: 90 TABLET | Refills: 1 | Status: SHIPPED | OUTPATIENT
Start: 2023-11-14

## 2023-11-14 RX ORDER — ROSUVASTATIN CALCIUM 10 MG/1
10 TABLET, COATED ORAL DAILY
Qty: 90 TABLET | Refills: 1 | Status: SHIPPED | OUTPATIENT
Start: 2023-11-14

## 2023-11-14 SDOH — ECONOMIC STABILITY: TRANSPORTATION INSECURITY
IN THE PAST 12 MONTHS, HAS THE LACK OF TRANSPORTATION KEPT YOU FROM MEDICAL APPOINTMENTS OR FROM GETTING MEDICATIONS?: NO

## 2023-11-14 SDOH — SOCIAL STABILITY: SOCIAL NETWORK
IN A TYPICAL WEEK, HOW MANY TIMES DO YOU TALK ON THE PHONE WITH FAMILY, FRIENDS, OR NEIGHBORS?: MORE THAN THREE TIMES A WEEK

## 2023-11-14 SDOH — SOCIAL STABILITY: SOCIAL NETWORK: HOW OFTEN DO YOU ATTEND CHURCH OR RELIGIOUS SERVICES?: MORE THAN 4 TIMES PER YEAR

## 2023-11-14 SDOH — HEALTH STABILITY: PHYSICAL HEALTH: ON AVERAGE, HOW MANY MINUTES DO YOU ENGAGE IN EXERCISE AT THIS LEVEL?: 20 MIN

## 2023-11-14 SDOH — SOCIAL STABILITY: SOCIAL INSECURITY: WITHIN THE LAST YEAR, HAVE YOU BEEN HUMILIATED OR EMOTIONALLY ABUSED IN OTHER WAYS BY YOUR PARTNER OR EX-PARTNER?: NO

## 2023-11-14 SDOH — ECONOMIC STABILITY: FOOD INSECURITY: WITHIN THE PAST 12 MONTHS, THE FOOD YOU BOUGHT JUST DIDN'T LAST AND YOU DIDN'T HAVE MONEY TO GET MORE.: NEVER TRUE

## 2023-11-14 SDOH — HEALTH STABILITY: MENTAL HEALTH
STRESS IS WHEN SOMEONE FEELS TENSE, NERVOUS, ANXIOUS, OR CAN'T SLEEP AT NIGHT BECAUSE THEIR MIND IS TROUBLED. HOW STRESSED ARE YOU?: ONLY A LITTLE

## 2023-11-14 SDOH — SOCIAL STABILITY: SOCIAL INSECURITY
WITHIN THE LAST YEAR, HAVE YOU BEEN KICKED, HIT, SLAPPED, OR OTHERWISE PHYSICALLY HURT BY YOUR PARTNER OR EX-PARTNER?: NO

## 2023-11-14 SDOH — ECONOMIC STABILITY: FOOD INSECURITY: WITHIN THE PAST 12 MONTHS, YOU WORRIED THAT YOUR FOOD WOULD RUN OUT BEFORE YOU GOT MONEY TO BUY MORE.: NEVER TRUE

## 2023-11-14 SDOH — SOCIAL STABILITY: SOCIAL NETWORK: ARE YOU MARRIED, WIDOWED, DIVORCED, SEPARATED, NEVER MARRIED, OR LIVING WITH A PARTNER?: LIVING WITH PARTNER

## 2023-11-14 SDOH — ECONOMIC STABILITY: TRANSPORTATION INSECURITY
IN THE PAST 12 MONTHS, HAS LACK OF TRANSPORTATION KEPT YOU FROM MEETINGS, WORK, OR FROM GETTING THINGS NEEDED FOR DAILY LIVING?: NO

## 2023-11-14 SDOH — SOCIAL STABILITY: SOCIAL NETWORK
DO YOU BELONG TO ANY CLUBS OR ORGANIZATIONS SUCH AS CHURCH GROUPS UNIONS, FRATERNAL OR ATHLETIC GROUPS, OR SCHOOL GROUPS?: NO

## 2023-11-14 SDOH — ECONOMIC STABILITY: INCOME INSECURITY: HOW HARD IS IT FOR YOU TO PAY FOR THE VERY BASICS LIKE FOOD, HOUSING, MEDICAL CARE, AND HEATING?: NOT HARD AT ALL

## 2023-11-14 SDOH — HEALTH STABILITY: MENTAL HEALTH
STRESS IS WHEN SOMEONE FEELS TENSE, NERVOUS, ANXIOUS, OR CAN'T SLEEP AT NIGHT BECAUSE THEIR MIND IS TROUBLED. HOW STRESSED ARE YOU?: TO SOME EXTENT

## 2023-11-14 SDOH — SOCIAL STABILITY: SOCIAL INSECURITY: WITHIN THE LAST YEAR, HAVE YOU BEEN AFRAID OF YOUR PARTNER OR EX-PARTNER?: NO

## 2023-11-14 SDOH — SOCIAL STABILITY: SOCIAL NETWORK: HOW OFTEN DO YOU ATTENT MEETINGS OF THE CLUB OR ORGANIZATION YOU BELONG TO?: NEVER

## 2023-11-14 SDOH — HEALTH STABILITY: MENTAL HEALTH: HOW OFTEN DO YOU HAVE A DRINK CONTAINING ALCOHOL?: NEVER

## 2023-11-14 SDOH — ECONOMIC STABILITY: INCOME INSECURITY: IN THE LAST 12 MONTHS, WAS THERE A TIME WHEN YOU WERE NOT ABLE TO PAY THE MORTGAGE OR RENT ON TIME?: NO

## 2023-11-14 SDOH — SOCIAL STABILITY: SOCIAL INSECURITY
WITHIN THE LAST YEAR, HAVE TO BEEN RAPED OR FORCED TO HAVE ANY KIND OF SEXUAL ACTIVITY BY YOUR PARTNER OR EX-PARTNER?: NO

## 2023-11-14 SDOH — SOCIAL STABILITY: SOCIAL NETWORK: HOW OFTEN DO YOU GET TOGETHER WITH FRIENDS OR RELATIVES?: MORE THAN THREE TIMES A WEEK

## 2023-11-14 SDOH — HEALTH STABILITY: PHYSICAL HEALTH: ON AVERAGE, HOW MANY DAYS PER WEEK DO YOU ENGAGE IN MODERATE TO STRENUOUS EXERCISE (LIKE A BRISK WALK)?: 7 DAYS

## 2023-11-14 SDOH — HEALTH STABILITY: MENTAL HEALTH: HOW MANY STANDARD DRINKS CONTAINING ALCOHOL DO YOU HAVE ON A TYPICAL DAY?: PATIENT DOES NOT DRINK

## 2023-11-14 ASSESSMENT — ENCOUNTER SYMPTOMS
SHORTNESS OF BREATH: 0
PHOTOPHOBIA: 0
ABDOMINAL PAIN: 0
BACK PAIN: 0
VOMITING: 0
EYE DISCHARGE: 0
SINUS PAIN: 0
ABDOMINAL DISTENTION: 0
SORE THROAT: 0
APNEA: 0
BLOOD IN STOOL: 0
WHEEZING: 0
TROUBLE SWALLOWING: 0
CONSTIPATION: 0
EYE ITCHING: 0
COUGH: 0
DIARRHEA: 0
NAUSEA: 0

## 2023-11-14 NOTE — PROGRESS NOTES
and regular rhythm. Pulses: Normal pulses. Heart sounds: Normal heart sounds. Pulmonary:      Effort: Pulmonary effort is normal.      Breath sounds: Normal breath sounds. Abdominal:      General: Abdomen is flat. Bowel sounds are normal.      Palpations: Abdomen is soft. Musculoskeletal:         General: Normal range of motion. Right shoulder: Normal.      Left shoulder: Normal.      Right upper arm: Normal.      Left upper arm: Normal.      Right elbow: Normal.      Left elbow: Normal.      Right forearm: Normal.      Left forearm: Normal.      Right wrist: Normal.      Left wrist: Normal.      Right hand: Normal.      Left hand: Normal.      Cervical back: Normal, normal range of motion and neck supple. Lumbar back: Normal.   Skin:     General: Skin is warm and dry. Neurological:      General: No focal deficit present. Mental Status: She is alert and oriented to person, place, and time. Mental status is at baseline. Psychiatric:         Mood and Affect: Mood normal.         Behavior: Behavior normal.         Thought Content: Thought content normal.         Judgment: Judgment normal.               ASSESSMENT/PLAN:  1. Primary hypertension  2. Mixed hyperlipidemia  -     Comprehensive Metabolic Panel; Future  -     Lipid, Fasting; Future  3. Vitamin D deficiency  4. Panlobular emphysema ([address])  -     CBC with Auto Differential; Future      No follow-ups on file. An electronic signature was used to authenticate this note.     --Vandana Cabrera MD

## 2023-12-12 ENCOUNTER — OFFICE VISIT (OUTPATIENT)
Dept: PRIMARY CARE CLINIC | Age: 63
End: 2023-12-12
Payer: COMMERCIAL

## 2023-12-12 VITALS
WEIGHT: 114 LBS | DIASTOLIC BLOOD PRESSURE: 70 MMHG | HEART RATE: 70 BPM | SYSTOLIC BLOOD PRESSURE: 110 MMHG | OXYGEN SATURATION: 98 % | HEIGHT: 66 IN | TEMPERATURE: 97.8 F | BODY MASS INDEX: 18.32 KG/M2

## 2023-12-12 DIAGNOSIS — R39.9 UTI SYMPTOMS: ICD-10-CM

## 2023-12-12 DIAGNOSIS — R39.9 UTI SYMPTOMS: Primary | ICD-10-CM

## 2023-12-12 DIAGNOSIS — N95.2 ATROPHIC VAGINITIS: ICD-10-CM

## 2023-12-12 LAB
BILIRUBIN, POC: NORMAL
BLOOD URINE, POC: NORMAL
CLARITY, POC: CLEAR
COLOR, POC: NORMAL
GLUCOSE URINE, POC: NORMAL
KETONES, POC: NORMAL
LEUKOCYTE EST, POC: NORMAL
NITRITE, POC: NORMAL
PH, POC: 6
PROTEIN, POC: NORMAL
SPECIFIC GRAVITY, POC: 1.01
UROBILINOGEN, POC: NORMAL

## 2023-12-12 PROCEDURE — 81002 URINALYSIS NONAUTO W/O SCOPE: CPT | Performed by: INTERNAL MEDICINE

## 2023-12-12 PROCEDURE — G8427 DOCREV CUR MEDS BY ELIG CLIN: HCPCS | Performed by: INTERNAL MEDICINE

## 2023-12-12 PROCEDURE — G8484 FLU IMMUNIZE NO ADMIN: HCPCS | Performed by: INTERNAL MEDICINE

## 2023-12-12 PROCEDURE — 4004F PT TOBACCO SCREEN RCVD TLK: CPT | Performed by: INTERNAL MEDICINE

## 2023-12-12 PROCEDURE — 3078F DIAST BP <80 MM HG: CPT | Performed by: INTERNAL MEDICINE

## 2023-12-12 PROCEDURE — 3074F SYST BP LT 130 MM HG: CPT | Performed by: INTERNAL MEDICINE

## 2023-12-12 PROCEDURE — G8419 CALC BMI OUT NRM PARAM NOF/U: HCPCS | Performed by: INTERNAL MEDICINE

## 2023-12-12 PROCEDURE — 99213 OFFICE O/P EST LOW 20 MIN: CPT | Performed by: INTERNAL MEDICINE

## 2023-12-12 PROCEDURE — 3017F COLORECTAL CA SCREEN DOC REV: CPT | Performed by: INTERNAL MEDICINE

## 2023-12-12 RX ORDER — ESTRADIOL 0.1 MG/G
CREAM VAGINAL
Qty: 42.5 G | Refills: 0 | Status: SHIPPED | OUTPATIENT
Start: 2023-12-12

## 2023-12-12 RX ORDER — SULFAMETHOXAZOLE AND TRIMETHOPRIM 800; 160 MG/1; MG/1
1 TABLET ORAL 2 TIMES DAILY
Qty: 20 TABLET | Refills: 0 | Status: SHIPPED | OUTPATIENT
Start: 2023-12-12 | End: 2023-12-22

## 2023-12-12 ASSESSMENT — ENCOUNTER SYMPTOMS
EYE ITCHING: 0
SINUS PAIN: 0
VOMITING: 0
DIARRHEA: 0
SORE THROAT: 0
NAUSEA: 0
ABDOMINAL PAIN: 0
COUGH: 0
BACK PAIN: 0
PHOTOPHOBIA: 0
APNEA: 0
WHEEZING: 0
SHORTNESS OF BREATH: 0
ABDOMINAL DISTENTION: 0
BLOOD IN STOOL: 0
CONSTIPATION: 0
EYE DISCHARGE: 0
TROUBLE SWALLOWING: 0

## 2023-12-12 NOTE — PROGRESS NOTES
Transportation     Lack of Transportation (Medical): No     Lack of Transportation (Non-Medical): No   Physical Activity: Insufficiently Active (11/14/2023)    Exercise Vital Sign     Days of Exercise per Week: 7 days     Minutes of Exercise per Session: 20 min   Stress: No Stress Concern Present (11/14/2023)    [facility]     Feeling of Stress : Only a little   Recent Concern: Stress - Stress Concern Present (11/14/2023)    [facility]     Feeling of Stress : To some extent   Social Connections:  Moderately Integrated (11/14/2023)    Social Connection and Isolation Panel [NHANES]     Frequency of Communication with Friends and Family: More than three times a week     Frequency of Social Gatherings with Friends and Family: More than three times a week     Attends [employer] Services: More than 4 times per year     Active Member of [employer] or Organizations: No     Attends Club or Organization Meetings: Never     Marital Status: Living with partner   Intimate Partner Violence: Not At Risk (11/14/2023)    Humiliation, Afraid, Rape, and Kick questionnaire     Fear of Current or Ex-Partner: No     Emotionally Abused: No     Physically Abused: No     Sexually Abused: No   Housing Stability: Unknown (11/14/2023)    Housing Stability Vital Sign     Unable to Pay for Housing in the Last Year: No     Number of [address] in the Last Year: Not on file     Unstable Housing in the Last Year: No      Health Maintenance Due   Topic Date Due    Pneumococcal 0-64 years Vaccine (1 - PCV) Never done    DTaP/Tdap/Td vaccine (1 - Tdap) Never done    Shingles vaccine (1 of 2) Never done    Respiratory Syncytial Virus (RSV) age 61 yrs+ (3 - 1-dose 60+ series) Never done    Flu vaccine (1) Never done    COVID-19 Vaccine (3 - 2023-24 season) 09/01/2023        Physical Exam  Constitutional:       Appearance: Normal

## 2023-12-15 LAB
CULTURE: ABNORMAL
CULTURE: ABNORMAL
SPECIMEN DESCRIPTION: ABNORMAL

## 2024-04-15 NOTE — PROGRESS NOTES
Cherrie Weems (:  1960) is a 64 y.o. female,Established patient, here for evaluation of the following chief complaint(s):  6 Month Follow-Up, Hypertension, and Health Maintenance (Had dexa scan done at [facility] (auth signed to obtain))      Subjective   SUBJECTIVE/OBJECTIVE:  HPI:  Hypertension:  Patient is here for follow up chronic hypertension.  This is  generally controlled on current medication regimen.    BP Readings from Last 1 Encounters:   24 124/68        Takes meds as directed and tolerates them well.  Most recent labs reviewed with patient and are not remarkable.  No symptoms from htn standpoint per ROS.  Patient is  compliant with lifestyle modifications.  Patient does not smoke.   Hyperlipidemia:  Patient is here to follow up regarding chronic hyperlipidemia.  This is  generally controlled.  Treatment includes crestor  Patient is  compliant with lifestyle modifications.  Patient is not a smoker.  Most recent labs reviewed with patient today and are not remarkable.  .       Review of Systems   Constitutional:  Negative for activity change, appetite change, fever and unexpected weight change.   HENT:  Negative for congestion, ear pain, hearing loss, sinus pain, sore throat, tinnitus and trouble swallowing.    Eyes:  Negative for photophobia, discharge, itching and visual disturbance.   Respiratory:  Negative for apnea, cough, shortness of breath and wheezing.    Cardiovascular:  Negative for chest pain, palpitations and leg swelling.   Gastrointestinal:  Negative for abdominal distention, abdominal pain, blood in stool, constipation, diarrhea, nausea and vomiting.   Endocrine: Negative for cold intolerance, polydipsia and polyuria.   Genitourinary:  Negative for difficulty urinating, dysuria, frequency and pelvic pain.   Musculoskeletal:  Negative for arthralgias, back pain, joint swelling, myalgias, neck pain and neck stiffness.   Skin:  Negative for rash and wound.   Neurological:  Negative

## 2024-05-08 DIAGNOSIS — E78.2 MIXED HYPERLIPIDEMIA: ICD-10-CM

## 2024-05-08 DIAGNOSIS — J43.1 PANLOBULAR EMPHYSEMA (HCC): ICD-10-CM

## 2024-05-08 LAB
ALBUMIN: 4.1 G/DL (ref 3.5–5.2)
ALP BLD-CCNC: 79 U/L (ref 35–104)
ALT SERPL-CCNC: 10 U/L (ref 0–32)
ANION GAP SERPL CALCULATED.3IONS-SCNC: 13 MMOL/L (ref 7–16)
AST SERPL-CCNC: 20 U/L (ref 0–31)
BASOPHILS ABSOLUTE: 0.03 K/UL (ref 0–0.2)
BASOPHILS RELATIVE PERCENT: 1 % (ref 0–2)
BILIRUB SERPL-MCNC: 0.8 MG/DL (ref 0–1.2)
BUN BLDV-MCNC: 14 MG/DL (ref 6–23)
CALCIUM SERPL-MCNC: 9.6 MG/DL (ref 8.6–10.2)
CHLORIDE BLD-SCNC: 104 MMOL/L (ref 98–107)
CHOLESTEROL, FASTING: 159 MG/DL
CO2: 24 MMOL/L (ref 22–29)
CREAT SERPL-MCNC: 0.9 MG/DL (ref 0.5–1)
EOSINOPHILS ABSOLUTE: 0.17 K/UL (ref 0.05–0.5)
EOSINOPHILS RELATIVE PERCENT: 3 % (ref 0–6)
GFR, ESTIMATED: 75 ML/MIN/1.73M2
GLUCOSE BLD-MCNC: 102 MG/DL (ref 74–99)
HCT VFR BLD CALC: 42.4 % (ref 34–48)
HDLC SERPL-MCNC: 46 MG/DL
HEMOGLOBIN: 13.4 G/DL (ref 11.5–15.5)
IMMATURE GRANULOCYTES %: 1 % (ref 0–5)
IMMATURE GRANULOCYTES ABSOLUTE: 0.03 K/UL (ref 0–0.58)
LDL CHOLESTEROL: 95 MG/DL
LYMPHOCYTES ABSOLUTE: 1.33 K/UL (ref 1.5–4)
LYMPHOCYTES RELATIVE PERCENT: 20 % (ref 20–42)
MCH RBC QN AUTO: 30 PG (ref 26–35)
MCHC RBC AUTO-ENTMCNC: 31.6 G/DL (ref 32–34.5)
MCV RBC AUTO: 95.1 FL (ref 80–99.9)
MONOCYTES ABSOLUTE: 0.63 K/UL (ref 0.1–0.95)
MONOCYTES RELATIVE PERCENT: 10 % (ref 2–12)
NEUTROPHILS ABSOLUTE: 4.36 K/UL (ref 1.8–7.3)
NEUTROPHILS RELATIVE PERCENT: 67 % (ref 43–80)
PDW BLD-RTO: 12.7 % (ref 11.5–15)
PLATELET # BLD: 280 K/UL (ref 130–450)
PMV BLD AUTO: 10.3 FL (ref 7–12)
POTASSIUM SERPL-SCNC: 4.3 MMOL/L (ref 3.5–5)
RBC # BLD: 4.46 M/UL (ref 3.5–5.5)
SODIUM BLD-SCNC: 141 MMOL/L (ref 132–146)
TOTAL PROTEIN: 7.7 G/DL (ref 6.4–8.3)
TRIGLYCERIDE, FASTING: 89 MG/DL
VLDLC SERPL CALC-MCNC: 18 MG/DL
WBC # BLD: 6.6 K/UL (ref 4.5–11.5)

## 2024-05-14 ENCOUNTER — OFFICE VISIT (OUTPATIENT)
Dept: PRIMARY CARE CLINIC | Age: 64
End: 2024-05-14
Payer: COMMERCIAL

## 2024-05-14 VITALS
TEMPERATURE: 97.9 F | OXYGEN SATURATION: 95 % | DIASTOLIC BLOOD PRESSURE: 68 MMHG | BODY MASS INDEX: 18.23 KG/M2 | WEIGHT: 113.4 LBS | HEART RATE: 61 BPM | SYSTOLIC BLOOD PRESSURE: 124 MMHG | HEIGHT: 66 IN

## 2024-05-14 DIAGNOSIS — R73.01 IMPAIRED FASTING GLUCOSE: ICD-10-CM

## 2024-05-14 DIAGNOSIS — J43.1 PANLOBULAR EMPHYSEMA (HCC): ICD-10-CM

## 2024-05-14 DIAGNOSIS — E78.2 MIXED HYPERLIPIDEMIA: ICD-10-CM

## 2024-05-14 DIAGNOSIS — E55.9 VITAMIN D DEFICIENCY: ICD-10-CM

## 2024-05-14 DIAGNOSIS — I10 PRIMARY HYPERTENSION: Primary | ICD-10-CM

## 2024-05-14 PROCEDURE — 3074F SYST BP LT 130 MM HG: CPT | Performed by: INTERNAL MEDICINE

## 2024-05-14 PROCEDURE — 3078F DIAST BP <80 MM HG: CPT | Performed by: INTERNAL MEDICINE

## 2024-05-14 PROCEDURE — 99214 OFFICE O/P EST MOD 30 MIN: CPT | Performed by: INTERNAL MEDICINE

## 2024-05-14 PROCEDURE — 3017F COLORECTAL CA SCREEN DOC REV: CPT | Performed by: INTERNAL MEDICINE

## 2024-05-14 PROCEDURE — G8427 DOCREV CUR MEDS BY ELIG CLIN: HCPCS | Performed by: INTERNAL MEDICINE

## 2024-05-14 PROCEDURE — 3023F SPIROM DOC REV: CPT | Performed by: INTERNAL MEDICINE

## 2024-05-14 PROCEDURE — 1090F PRES/ABSN URINE INCON ASSESS: CPT | Performed by: INTERNAL MEDICINE

## 2024-05-14 PROCEDURE — G8420 CALC BMI NORM PARAMETERS: HCPCS | Performed by: INTERNAL MEDICINE

## 2024-05-14 PROCEDURE — 1123F ACP DISCUSS/DSCN MKR DOCD: CPT | Performed by: INTERNAL MEDICINE

## 2024-05-14 PROCEDURE — 4004F PT TOBACCO SCREEN RCVD TLK: CPT | Performed by: INTERNAL MEDICINE

## 2024-05-14 PROCEDURE — G8400 PT W/DXA NO RESULTS DOC: HCPCS | Performed by: INTERNAL MEDICINE

## 2024-05-14 RX ORDER — METOPROLOL SUCCINATE 50 MG/1
TABLET, EXTENDED RELEASE ORAL
Qty: 90 TABLET | Refills: 1 | Status: SHIPPED | OUTPATIENT
Start: 2024-05-14

## 2024-05-14 RX ORDER — ROSUVASTATIN CALCIUM 10 MG/1
10 TABLET, COATED ORAL DAILY
Qty: 90 TABLET | Refills: 1 | Status: SHIPPED | OUTPATIENT
Start: 2024-05-14

## 2024-05-14 RX ORDER — ERGOCALCIFEROL 1.25 MG/1
50000 CAPSULE ORAL WEEKLY
Qty: 12 CAPSULE | Refills: 1 | Status: SHIPPED | OUTPATIENT
Start: 2024-05-14

## 2024-05-14 ASSESSMENT — PATIENT HEALTH QUESTIONNAIRE - PHQ9
SUM OF ALL RESPONSES TO PHQ QUESTIONS 1-9: 0
2. FEELING DOWN, DEPRESSED OR HOPELESS: NOT AT ALL
SUM OF ALL RESPONSES TO PHQ QUESTIONS 1-9: 0
SUM OF ALL RESPONSES TO PHQ9 QUESTIONS 1 & 2: 0
SUM OF ALL RESPONSES TO PHQ QUESTIONS 1-9: 0
SUM OF ALL RESPONSES TO PHQ QUESTIONS 1-9: 0
1. LITTLE INTEREST OR PLEASURE IN DOING THINGS: NOT AT ALL

## 2024-06-22 ENCOUNTER — HOSPITAL ENCOUNTER (EMERGENCY)
Age: 64
Discharge: HOME | End: 2024-06-22
Attending: STUDENT IN AN ORGANIZED HEALTH CARE EDUCATION/TRAINING PROGRAM
Payer: COMMERCIAL

## 2024-06-22 VITALS
DIASTOLIC BLOOD PRESSURE: 60 MMHG | TEMPERATURE: 97.6 F | BODY MASS INDEX: 18.03 KG/M2 | HEART RATE: 65 BPM | OXYGEN SATURATION: 99 % | WEIGHT: 110 LBS | RESPIRATION RATE: 18 BRPM | SYSTOLIC BLOOD PRESSURE: 120 MMHG

## 2024-06-22 DIAGNOSIS — N39.0 UTI (URINARY TRACT INFECTION), BACTERIAL: Primary | ICD-10-CM

## 2024-06-22 DIAGNOSIS — A49.9 UTI (URINARY TRACT INFECTION), BACTERIAL: Primary | ICD-10-CM

## 2024-06-22 LAB
BACTERIA URNS QL MICRO: ABNORMAL
BILIRUB UR QL STRIP: ABNORMAL
CLARITY UR: ABNORMAL
COLOR UR: ABNORMAL
GLUCOSE UR STRIP-MCNC: NEGATIVE MG/DL
HGB UR QL STRIP.AUTO: ABNORMAL
KETONES UR STRIP-MCNC: ABNORMAL MG/DL
LEUKOCYTE ESTERASE UR QL STRIP: ABNORMAL
NITRITE UR QL STRIP: POSITIVE
PH UR STRIP: 6.5 [PH] (ref 5–9)
PROT UR STRIP-MCNC: >=300 MG/DL
RBC #/AREA URNS HPF: ABNORMAL /HPF
SP GR UR STRIP: 1.02 (ref 1–1.03)
UROBILINOGEN UR STRIP-ACNC: 2 EU/DL (ref 0–1)
WBC #/AREA URNS HPF: ABNORMAL /HPF

## 2024-06-22 PROCEDURE — 81001 URINALYSIS AUTO W/SCOPE: CPT

## 2024-06-22 PROCEDURE — 87088 URINE BACTERIA CULTURE: CPT

## 2024-06-22 PROCEDURE — 87086 URINE CULTURE/COLONY COUNT: CPT

## 2024-06-22 PROCEDURE — 99283 EMERGENCY DEPT VISIT LOW MDM: CPT

## 2024-06-22 PROCEDURE — 6370000000 HC RX 637 (ALT 250 FOR IP): Performed by: STUDENT IN AN ORGANIZED HEALTH CARE EDUCATION/TRAINING PROGRAM

## 2024-06-22 RX ORDER — PHENAZOPYRIDINE HYDROCHLORIDE 100 MG/1
100 TABLET, FILM COATED ORAL EVERY 8 HOURS PRN
Qty: 9 TABLET | Refills: 0 | Status: SHIPPED | OUTPATIENT
Start: 2024-06-22 | End: 2024-06-25

## 2024-06-22 RX ORDER — PHENAZOPYRIDINE HYDROCHLORIDE 100 MG/1
200 TABLET, FILM COATED ORAL ONCE
Status: COMPLETED | OUTPATIENT
Start: 2024-06-22 | End: 2024-06-22

## 2024-06-22 RX ORDER — CEFDINIR 300 MG/1
300 CAPSULE ORAL 2 TIMES DAILY
Qty: 20 CAPSULE | Refills: 0 | Status: ON HOLD | OUTPATIENT
Start: 2024-06-22 | End: 2024-07-02

## 2024-06-22 RX ORDER — CEFDINIR 300 MG/1
300 CAPSULE ORAL ONCE
Status: COMPLETED | OUTPATIENT
Start: 2024-06-22 | End: 2024-06-22

## 2024-06-22 RX ADMIN — CEFDINIR 300 MG: 300 CAPSULE ORAL at 14:33

## 2024-06-22 RX ADMIN — PHENAZOPYRIDINE HYDROCHLORIDE 200 MG: 100 TABLET ORAL at 14:33

## 2024-06-22 ASSESSMENT — PAIN DESCRIPTION - ONSET: ONSET: ON-GOING

## 2024-06-22 ASSESSMENT — PAIN DESCRIPTION - LOCATION: LOCATION: VULVA;ABDOMEN

## 2024-06-22 ASSESSMENT — LIFESTYLE VARIABLES
HOW MANY STANDARD DRINKS CONTAINING ALCOHOL DO YOU HAVE ON A TYPICAL DAY: PATIENT DOES NOT DRINK
HOW OFTEN DO YOU HAVE A DRINK CONTAINING ALCOHOL: NEVER

## 2024-06-22 ASSESSMENT — PAIN SCALES - GENERAL: PAINLEVEL_OUTOF10: 10

## 2024-06-22 ASSESSMENT — PAIN DESCRIPTION - PAIN TYPE: TYPE: ACUTE PAIN

## 2024-06-22 ASSESSMENT — PAIN DESCRIPTION - FREQUENCY: FREQUENCY: CONTINUOUS

## 2024-06-22 ASSESSMENT — PAIN - FUNCTIONAL ASSESSMENT: PAIN_FUNCTIONAL_ASSESSMENT: 0-10

## 2024-06-22 ASSESSMENT — PAIN DESCRIPTION - DESCRIPTORS: DESCRIPTORS: BURNING;SORE;PRESSURE

## 2024-06-22 NOTE — ED PROVIDER NOTES
[facility] EMERGENCY DEPARTMENT  EMERGENCY DEPARTMENT ENCOUNTER        Pt Name: Cherrie Weems  MRN: [id number]  Birthdate 1960  Date of evaluation: 6/22/2024  Provider: Betty Cowart DO  PCP: Janette Arellano MD  Note Started: 2:33 PM EDT 6/22/24    CHIEF COMPLAINT       Chief Complaint   Patient presents with    Urinary Tract Infection     Frequency, pain, burning       HISTORY OF PRESENT ILLNESS: 1 or more Elements     Limitations to history : None    Cherrie Weems is a 64 y.o. female who presents the emergency department for evaluation of UTI symptoms ongoing for the past several days; patient has been having increased urinary frequency and dysuria as well.  She denies any abdominal or flank pain.  She has not had any fevers or chills or difficulties with urination.  Denies any vaginal bleeding or discharge.  No nausea vomiting or diarrhea.      Nursing Notes were all reviewed and agreed with or any disagreements were addressed in the HPI.      REVIEW OF EXTERNAL NOTE :       Reviewed documentation from office visit with primary care on 5/14/2024.    Chart Review/External Note Review    Last Echo reviewed by Me:  No results found for: \"LVEF\", \"LVEFMODE\"        Controlled Substance Monitoring:    Acute and Chronic Pain Monitoring:        No data to display                      REVIEW OF SYSTEMS :      Review of Systems   Constitutional:  Negative for chills and fever.   Respiratory:  Negative for cough and shortness of breath.    Cardiovascular:  Negative for chest pain and palpitations.   Gastrointestinal:  Negative for abdominal pain, diarrhea, nausea and vomiting.   Genitourinary:  Positive for dysuria and frequency. Negative for decreased urine volume, difficulty urinating and flank pain.   Musculoskeletal:  Negative for back pain and neck pain.       Pertinent positives and negatives are stated within HPI or above, all other systems reviewed and are  symptoms worsen      DISCHARGE MEDICATIONS:  New Prescriptions    CEFDINIR (OMNICEF) 300 MG CAPSULE    Take 1 capsule by mouth 2 times daily for 10 days    PHENAZOPYRIDINE (PYRIDIUM) 100 MG TABLET    Take 1 tablet by mouth every 8 hours as needed for Pain       DISCONTINUED MEDICATIONS:  Discontinued Medications    No medications on file              ***       Betty Cowart D.O.     Emergency Medicine      6/22/2024 2:33 PM      NOTE: This report was transcribed using voice recognition software. Every effort was made to ensure accuracy; however, inadvertent computerized transcription errors may be present

## 2024-06-22 NOTE — DISCHARGE INSTRUCTIONS
Please return to the ER for any new or worsening symptoms including but not limited to Fever or difficulty urinating/decreased urination  If prescribed, please be sure to  your prescriptions from the pharmacy  Please follow-up with Primary care provider as instructed

## 2024-06-24 ASSESSMENT — ENCOUNTER SYMPTOMS
VOMITING: 0
NAUSEA: 0
BACK PAIN: 0
DIARRHEA: 0
COUGH: 0
ABDOMINAL PAIN: 0
SHORTNESS OF BREATH: 0

## 2024-06-25 LAB
MICROORGANISM SPEC CULT: ABNORMAL
SERVICE CMNT-IMP: ABNORMAL
SPECIMEN DESCRIPTION: ABNORMAL

## 2024-06-30 ENCOUNTER — HOSPITAL ENCOUNTER (INPATIENT)
Age: 64
LOS: 13 days | Discharge: HOME HEALTH | End: 2024-07-13
Attending: EMERGENCY MEDICINE | Admitting: SURGERY
Payer: COMMERCIAL

## 2024-06-30 ENCOUNTER — APPOINTMENT (OUTPATIENT)
Dept: CT IMAGING | Age: 64
End: 2024-06-30
Payer: COMMERCIAL

## 2024-06-30 DIAGNOSIS — K57.92 DIVERTICULITIS: ICD-10-CM

## 2024-06-30 DIAGNOSIS — E87.1 HYPONATREMIA: ICD-10-CM

## 2024-06-30 DIAGNOSIS — K66.8 PNEUMOPERITONEUM: Primary | ICD-10-CM

## 2024-06-30 DIAGNOSIS — K63.89 COLONIC MASS: ICD-10-CM

## 2024-06-30 DIAGNOSIS — R79.89 ELEVATED BRAIN NATRIURETIC PEPTIDE (BNP) LEVEL: ICD-10-CM

## 2024-06-30 DIAGNOSIS — K57.32 DIVERTICULITIS OF COLON: ICD-10-CM

## 2024-06-30 DIAGNOSIS — G89.18 POSTOPERATIVE PAIN: ICD-10-CM

## 2024-06-30 PROBLEM — R19.8 PERFORATED VISCUS: Status: ACTIVE | Noted: 2024-06-30

## 2024-06-30 LAB
ANION GAP SERPL CALCULATED.3IONS-SCNC: 16 MMOL/L (ref 7–16)
BACTERIA URNS QL MICRO: ABNORMAL
BASOPHILS # BLD: 0.05 K/UL (ref 0–0.2)
BASOPHILS NFR BLD: 0 % (ref 0–2)
BILIRUB UR QL STRIP: NEGATIVE
BUN SERPL-MCNC: 10 MG/DL (ref 6–23)
CALCIUM SERPL-MCNC: 9.3 MG/DL (ref 8.6–10.2)
CHLORIDE SERPL-SCNC: 90 MMOL/L (ref 98–107)
CLARITY UR: ABNORMAL
CO2 SERPL-SCNC: 21 MMOL/L (ref 22–29)
COLOR UR: YELLOW
CREAT SERPL-MCNC: 0.7 MG/DL (ref 0.5–1)
EOSINOPHIL # BLD: 0.02 K/UL (ref 0.05–0.5)
EOSINOPHILS RELATIVE PERCENT: 0 % (ref 0–6)
ERYTHROCYTE [DISTWIDTH] IN BLOOD BY AUTOMATED COUNT: 12.1 % (ref 11.5–15)
GFR, ESTIMATED: >90 ML/MIN/1.73M2
GLUCOSE SERPL-MCNC: 125 MG/DL (ref 74–99)
GLUCOSE UR STRIP-MCNC: NEGATIVE MG/DL
HCT VFR BLD AUTO: 40.3 % (ref 34–48)
HGB BLD-MCNC: 13.5 G/DL (ref 11.5–15.5)
HGB UR QL STRIP.AUTO: ABNORMAL
IMM GRANULOCYTES # BLD AUTO: 0.05 K/UL (ref 0–0.58)
IMM GRANULOCYTES NFR BLD: 0 % (ref 0–5)
KETONES UR STRIP-MCNC: 40 MG/DL
LEUKOCYTE ESTERASE UR QL STRIP: ABNORMAL
LYMPHOCYTES NFR BLD: 0.82 K/UL (ref 1.5–4)
LYMPHOCYTES RELATIVE PERCENT: 6 % (ref 20–42)
MCH RBC QN AUTO: 30.1 PG (ref 26–35)
MCHC RBC AUTO-ENTMCNC: 33.5 G/DL (ref 32–34.5)
MCV RBC AUTO: 89.8 FL (ref 80–99.9)
MONOCYTES NFR BLD: 0.64 K/UL (ref 0.1–0.95)
MONOCYTES NFR BLD: 4 % (ref 2–12)
NEUTROPHILS NFR BLD: 90 % (ref 43–80)
NEUTS SEG NFR BLD: 13.26 K/UL (ref 1.8–7.3)
NITRITE UR QL STRIP: NEGATIVE
PH UR STRIP: 5.5 [PH] (ref 5–9)
PLATELET # BLD AUTO: 375 K/UL (ref 130–450)
PMV BLD AUTO: 8.9 FL (ref 7–12)
POTASSIUM SERPL-SCNC: 4.3 MMOL/L (ref 3.5–5)
PROT UR STRIP-MCNC: >=300 MG/DL
RBC # BLD AUTO: 4.49 M/UL (ref 3.5–5.5)
RBC #/AREA URNS HPF: ABNORMAL /HPF
SODIUM SERPL-SCNC: 127 MMOL/L (ref 132–146)
SP GR UR STRIP: >1.03 (ref 1–1.03)
UROBILINOGEN UR STRIP-ACNC: 0.2 EU/DL (ref 0–1)
WBC #/AREA URNS HPF: ABNORMAL /HPF
WBC OTHER # BLD: 14.8 K/UL (ref 4.5–11.5)

## 2024-06-30 PROCEDURE — 6360000002 HC RX W HCPCS: Performed by: SURGERY

## 2024-06-30 PROCEDURE — 74177 CT ABD & PELVIS W/CONTRAST: CPT

## 2024-06-30 PROCEDURE — 99285 EMERGENCY DEPT VISIT HI MDM: CPT

## 2024-06-30 PROCEDURE — 6360000002 HC RX W HCPCS: Performed by: EMERGENCY MEDICINE

## 2024-06-30 PROCEDURE — 99222 1ST HOSP IP/OBS MODERATE 55: CPT | Performed by: FAMILY MEDICINE

## 2024-06-30 PROCEDURE — 2580000003 HC RX 258: Performed by: EMERGENCY MEDICINE

## 2024-06-30 PROCEDURE — 85025 COMPLETE CBC W/AUTO DIFF WBC: CPT

## 2024-06-30 PROCEDURE — 81001 URINALYSIS AUTO W/SCOPE: CPT

## 2024-06-30 PROCEDURE — 2580000003 HC RX 258: Performed by: SURGERY

## 2024-06-30 PROCEDURE — 80048 BASIC METABOLIC PNL TOTAL CA: CPT

## 2024-06-30 PROCEDURE — 6370000000 HC RX 637 (ALT 250 FOR IP): Performed by: FAMILY MEDICINE

## 2024-06-30 PROCEDURE — 1200000000 HC SEMI PRIVATE

## 2024-06-30 PROCEDURE — 93005 ELECTROCARDIOGRAM TRACING: CPT | Performed by: FAMILY MEDICINE

## 2024-06-30 PROCEDURE — 6360000004 HC RX CONTRAST MEDICATION: Performed by: RADIOLOGY

## 2024-06-30 PROCEDURE — 96374 THER/PROPH/DIAG INJ IV PUSH: CPT

## 2024-06-30 RX ORDER — METRONIDAZOLE 500 MG/100ML
500 INJECTION, SOLUTION INTRAVENOUS ONCE
Status: COMPLETED | OUTPATIENT
Start: 2024-06-30 | End: 2024-06-30

## 2024-06-30 RX ORDER — DIPHENHYDRAMINE HYDROCHLORIDE 50 MG/ML
25 INJECTION INTRAMUSCULAR; INTRAVENOUS EVERY 6 HOURS PRN
Status: DISCONTINUED | OUTPATIENT
Start: 2024-06-30 | End: 2024-07-13 | Stop reason: HOSPADM

## 2024-06-30 RX ORDER — SODIUM CHLORIDE 0.9 % (FLUSH) 0.9 %
5-40 SYRINGE (ML) INJECTION EVERY 12 HOURS SCHEDULED
Status: DISCONTINUED | OUTPATIENT
Start: 2024-06-30 | End: 2024-07-13 | Stop reason: HOSPADM

## 2024-06-30 RX ORDER — NICOTINE 21 MG/24HR
1 PATCH, TRANSDERMAL 24 HOURS TRANSDERMAL DAILY
Status: DISCONTINUED | OUTPATIENT
Start: 2024-07-01 | End: 2024-06-30

## 2024-06-30 RX ORDER — 0.9 % SODIUM CHLORIDE 0.9 %
1000 INTRAVENOUS SOLUTION INTRAVENOUS ONCE
Status: COMPLETED | OUTPATIENT
Start: 2024-06-30 | End: 2024-06-30

## 2024-06-30 RX ORDER — MAGNESIUM SULFATE IN WATER 40 MG/ML
2000 INJECTION, SOLUTION INTRAVENOUS PRN
Status: DISCONTINUED | OUTPATIENT
Start: 2024-06-30 | End: 2024-07-13 | Stop reason: HOSPADM

## 2024-06-30 RX ORDER — KETOROLAC TROMETHAMINE 15 MG/ML
15 INJECTION, SOLUTION INTRAMUSCULAR; INTRAVENOUS ONCE
Status: COMPLETED | OUTPATIENT
Start: 2024-06-30 | End: 2024-06-30

## 2024-06-30 RX ORDER — POTASSIUM CHLORIDE 7.45 MG/ML
10 INJECTION INTRAVENOUS PRN
Status: DISCONTINUED | OUTPATIENT
Start: 2024-06-30 | End: 2024-07-13 | Stop reason: HOSPADM

## 2024-06-30 RX ORDER — NICOTINE 21 MG/24HR
1 PATCH, TRANSDERMAL 24 HOURS TRANSDERMAL DAILY
Status: DISCONTINUED | OUTPATIENT
Start: 2024-06-30 | End: 2024-07-13 | Stop reason: HOSPADM

## 2024-06-30 RX ORDER — IPRATROPIUM BROMIDE AND ALBUTEROL SULFATE 2.5; .5 MG/3ML; MG/3ML
1 SOLUTION RESPIRATORY (INHALATION) EVERY 4 HOURS PRN
Status: DISCONTINUED | OUTPATIENT
Start: 2024-06-30 | End: 2024-07-13 | Stop reason: HOSPADM

## 2024-06-30 RX ORDER — SODIUM CHLORIDE 9 MG/ML
INJECTION, SOLUTION INTRAVENOUS PRN
Status: DISCONTINUED | OUTPATIENT
Start: 2024-06-30 | End: 2024-07-13 | Stop reason: HOSPADM

## 2024-06-30 RX ORDER — KETOROLAC TROMETHAMINE 15 MG/ML
15 INJECTION, SOLUTION INTRAMUSCULAR; INTRAVENOUS EVERY 6 HOURS
Status: DISCONTINUED | OUTPATIENT
Start: 2024-06-30 | End: 2024-07-01 | Stop reason: SDUPTHER

## 2024-06-30 RX ORDER — ONDANSETRON 4 MG/1
4 TABLET, ORALLY DISINTEGRATING ORAL EVERY 8 HOURS PRN
Status: DISCONTINUED | OUTPATIENT
Start: 2024-06-30 | End: 2024-07-13 | Stop reason: HOSPADM

## 2024-06-30 RX ORDER — MORPHINE SULFATE 4 MG/ML
4 INJECTION, SOLUTION INTRAMUSCULAR; INTRAVENOUS
Status: DISCONTINUED | OUTPATIENT
Start: 2024-06-30 | End: 2024-07-05

## 2024-06-30 RX ORDER — SODIUM CHLORIDE 0.9 % (FLUSH) 0.9 %
5-40 SYRINGE (ML) INJECTION PRN
Status: DISCONTINUED | OUTPATIENT
Start: 2024-06-30 | End: 2024-07-13 | Stop reason: HOSPADM

## 2024-06-30 RX ORDER — SODIUM CHLORIDE, SODIUM LACTATE, POTASSIUM CHLORIDE, CALCIUM CHLORIDE 600; 310; 30; 20 MG/100ML; MG/100ML; MG/100ML; MG/100ML
INJECTION, SOLUTION INTRAVENOUS CONTINUOUS
Status: DISCONTINUED | OUTPATIENT
Start: 2024-06-30 | End: 2024-07-07

## 2024-06-30 RX ORDER — METRONIDAZOLE 500 MG/100ML
500 INJECTION, SOLUTION INTRAVENOUS EVERY 8 HOURS
Status: DISPENSED | OUTPATIENT
Start: 2024-07-01 | End: 2024-07-08

## 2024-06-30 RX ORDER — ENOXAPARIN SODIUM 100 MG/ML
40 INJECTION SUBCUTANEOUS DAILY
Status: DISCONTINUED | OUTPATIENT
Start: 2024-06-30 | End: 2024-07-12

## 2024-06-30 RX ORDER — ONDANSETRON 2 MG/ML
4 INJECTION INTRAMUSCULAR; INTRAVENOUS EVERY 6 HOURS PRN
Status: DISCONTINUED | OUTPATIENT
Start: 2024-06-30 | End: 2024-07-13 | Stop reason: HOSPADM

## 2024-06-30 RX ORDER — POTASSIUM CHLORIDE 20 MEQ/1
40 TABLET, EXTENDED RELEASE ORAL PRN
Status: DISCONTINUED | OUTPATIENT
Start: 2024-06-30 | End: 2024-07-13 | Stop reason: HOSPADM

## 2024-06-30 RX ORDER — MORPHINE SULFATE 2 MG/ML
2 INJECTION, SOLUTION INTRAMUSCULAR; INTRAVENOUS
Status: DISCONTINUED | OUTPATIENT
Start: 2024-06-30 | End: 2024-07-05

## 2024-06-30 RX ADMIN — KETOROLAC TROMETHAMINE 15 MG: 15 INJECTION, SOLUTION INTRAMUSCULAR; INTRAVENOUS at 20:37

## 2024-06-30 RX ADMIN — CEFTRIAXONE SODIUM 2000 MG: 2 INJECTION, POWDER, FOR SOLUTION INTRAMUSCULAR; INTRAVENOUS at 19:19

## 2024-06-30 RX ADMIN — METRONIDAZOLE 500 MG: 500 INJECTION, SOLUTION INTRAVENOUS at 19:20

## 2024-06-30 RX ADMIN — SODIUM CHLORIDE 1000 ML: 9 INJECTION, SOLUTION INTRAVENOUS at 17:11

## 2024-06-30 RX ADMIN — SODIUM CHLORIDE, POTASSIUM CHLORIDE, SODIUM LACTATE AND CALCIUM CHLORIDE: 600; 310; 30; 20 INJECTION, SOLUTION INTRAVENOUS at 20:37

## 2024-06-30 RX ADMIN — IOPAMIDOL 75 ML: 755 INJECTION, SOLUTION INTRAVENOUS at 18:04

## 2024-06-30 RX ADMIN — KETOROLAC TROMETHAMINE 15 MG: 15 INJECTION, SOLUTION INTRAMUSCULAR; INTRAVENOUS at 17:11

## 2024-06-30 ASSESSMENT — PAIN SCALES - GENERAL
PAINLEVEL_OUTOF10: 5
PAINLEVEL_OUTOF10: 0
PAINLEVEL_OUTOF10: 0

## 2024-06-30 ASSESSMENT — PAIN DESCRIPTION - LOCATION: LOCATION: ABDOMEN

## 2024-06-30 NOTE — ED PROVIDER NOTES
[facility] EMERGENCY DEPARTMENT  EMERGENCY DEPARTMENT ENCOUNTER        Pt Name: Cherrie Weems  MRN: [id number]  Birthdate 1960  Date of evaluation: 6/30/2024  Provider: Vaughn Montalvo DO  PCP: Janette Arellano MD  Note Started: 5:14 PM EDT 6/30/24    CHIEF COMPLAINT       Chief Complaint   Patient presents with    Dysuria     With urinary urgency, reports she has been seen at  for UTI and given an antibiotic, symptoms not improving        HISTORY OF PRESENT ILLNESS: 1 or more Elements   History From: patient    Limitations to history : None    Cherrie Weems is a 64 y.o. female who presents to the ED for evaluation of dysuria and lower abdominal discomfort.  Patient states that she had been seen recently was diagnosed with UTI.  Placed on Omnicef and Pyridium.  States that her symptoms were getting better but now have returned.  No dizziness or lightness.  No fever or chills.  No chest pain or shortness breath.  She does report some mild low back pain.  No blood in the urine.  No vaginal discharge.  No diarrhea.  No blood in the stool    Nursing Notes were all reviewed and agreed with or any disagreements were addressed in the HPI.      REVIEW OF EXTERNAL NOTE :        REVIEW OF SYSTEMS :           Positives and Pertinent negatives as per HPI.     SURGICAL HISTORY   History reviewed. No pertinent surgical history.    CURRENTMEDICATIONS       Previous Medications    ASPIRIN 81 MG EC TABLET    Take 1 tablet by mouth daily    CALCIUM CARBONATE 600 MG TABS TABLET    Take 1 tablet by mouth daily    CEFDINIR (OMNICEF) 300 MG CAPSULE    Take 1 capsule by mouth 2 times daily for 10 days    METOPROLOL SUCCINATE (TOPROL XL) 50 MG EXTENDED RELEASE TABLET    TAKE 1 TABLET BY MOUTH ONCE DAILY    MULTIPLE VITAMINS-MINERALS (CENTRUM SILVER 50+WOMEN PO)    Take 1 tablet by mouth daily    ROSUVASTATIN (CRESTOR) 10 MG TABLET    Take 1 tablet by mouth daily    VITAMIN B-12 (CYANOCOBALAMIN)  Determinants of health, Records Reviewed, DDx, testing done/not done, ED Course, Reassessment, disposition considerations/shared decision making with patient, consults, disposition:      ED Course as of 06/30/24 1909   Sun Jun 30, 2024   1717 Chart review shows that the urine was pansensitive. [MS]   1902 Patient resting comfortably in chair.  Discussed results of labs with her.  Discussed that she has evidence of pneumoperitoneum.  There is also questionable mass on the colon.  She will need to be admitted for further evaluation.  Consult has been placed to general surgery.  She is agreeable to admission. [MS]   1906 Spoke with Dr. Day (Surgery).  Discussed case.  He will admit this patient.  He will continue IV back overnight and most likely take her to the OR tomorrow   [MS]      ED Course User Index  [MS] Vaughn Montalvo, DO        Medical Decision Making  Amount and/or Complexity of Data Reviewed  Labs: ordered.  Radiology: ordered.    Risk  Prescription drug management.  Decision regarding hospitalization.      Patient presents to the ED for evaluation of dysuria and lower abdominal discomfort.  Patient states that she had been seen recently was diagnosed with UTI.  Placed on Omnicef and Pyridium.  States that her symptoms were getting better but now have returned.  No dizziness or lightness.  No fever or chills.  No chest pain or shortness breath.  She does report some mild low back pain. Differential diagnoses included but not limited to UTI, pyelonephritis, medical noncompliance. Workup in the ED consisted appropriate labs and imaging.  Urinalysis overall is improved from previous test.  BMP shows mild hyponatremia at 127.  No other electrolyte abnormality.  No evidence of renal insufficiency.  CBC does show leukocytosis without anemia.  Mild left shift noted.  CT on pelvis interpreted by the radiologist as showing findings consistent with severe phlegmonous inflammatory mass like formation throughout

## 2024-07-01 ENCOUNTER — ANESTHESIA (OUTPATIENT)
Dept: OPERATING ROOM | Age: 64
End: 2024-07-01
Payer: COMMERCIAL

## 2024-07-01 ENCOUNTER — ANESTHESIA EVENT (OUTPATIENT)
Dept: OPERATING ROOM | Age: 64
End: 2024-07-01
Payer: COMMERCIAL

## 2024-07-01 PROBLEM — K66.8 PNEUMOPERITONEUM: Status: ACTIVE | Noted: 2024-07-01

## 2024-07-01 LAB
ALBUMIN SERPL-MCNC: 3.4 G/DL (ref 3.5–5.2)
ALP SERPL-CCNC: 86 U/L (ref 35–104)
ALT SERPL-CCNC: 6 U/L (ref 0–32)
ANION GAP SERPL CALCULATED.3IONS-SCNC: 11 MMOL/L (ref 7–16)
AST SERPL-CCNC: 11 U/L (ref 0–31)
BASOPHILS # BLD: 0.02 K/UL (ref 0–0.2)
BASOPHILS NFR BLD: 0 % (ref 0–2)
BILIRUB SERPL-MCNC: 0.6 MG/DL (ref 0–1.2)
BUN SERPL-MCNC: 9 MG/DL (ref 6–23)
CALCIUM SERPL-MCNC: 9 MG/DL (ref 8.6–10.2)
CHLORIDE SERPL-SCNC: 100 MMOL/L (ref 98–107)
CO2 SERPL-SCNC: 23 MMOL/L (ref 22–29)
CREAT SERPL-MCNC: 0.7 MG/DL (ref 0.5–1)
EKG ATRIAL RATE: 71 BPM
EKG P AXIS: -14 DEGREES
EKG P-R INTERVAL: 148 MS
EKG Q-T INTERVAL: 420 MS
EKG QRS DURATION: 78 MS
EKG QTC CALCULATION (BAZETT): 456 MS
EKG R AXIS: 54 DEGREES
EKG T AXIS: 53 DEGREES
EKG VENTRICULAR RATE: 71 BPM
EOSINOPHIL # BLD: 0.03 K/UL (ref 0.05–0.5)
EOSINOPHILS RELATIVE PERCENT: 0 % (ref 0–6)
ERYTHROCYTE [DISTWIDTH] IN BLOOD BY AUTOMATED COUNT: 12.3 % (ref 11.5–15)
GFR, ESTIMATED: >90 ML/MIN/1.73M2
GLUCOSE SERPL-MCNC: 102 MG/DL (ref 74–99)
HCT VFR BLD AUTO: 35.1 % (ref 34–48)
HGB BLD-MCNC: 11.9 G/DL (ref 11.5–15.5)
IMM GRANULOCYTES # BLD AUTO: 0.06 K/UL (ref 0–0.58)
IMM GRANULOCYTES NFR BLD: 1 % (ref 0–5)
LYMPHOCYTES NFR BLD: 0.71 K/UL (ref 1.5–4)
LYMPHOCYTES RELATIVE PERCENT: 5 % (ref 20–42)
MCH RBC QN AUTO: 30.7 PG (ref 26–35)
MCHC RBC AUTO-ENTMCNC: 33.9 G/DL (ref 32–34.5)
MCV RBC AUTO: 90.7 FL (ref 80–99.9)
MONOCYTES NFR BLD: 0.54 K/UL (ref 0.1–0.95)
MONOCYTES NFR BLD: 4 % (ref 2–12)
NEUTROPHILS NFR BLD: 90 % (ref 43–80)
NEUTS SEG NFR BLD: 11.74 K/UL (ref 1.8–7.3)
PLATELET # BLD AUTO: 339 K/UL (ref 130–450)
PMV BLD AUTO: 9.4 FL (ref 7–12)
POTASSIUM SERPL-SCNC: 4.2 MMOL/L (ref 3.5–5)
PROT SERPL-MCNC: 7 G/DL (ref 6.4–8.3)
RBC # BLD AUTO: 3.87 M/UL (ref 3.5–5.5)
SODIUM SERPL-SCNC: 134 MMOL/L (ref 132–146)
WBC OTHER # BLD: 13.1 K/UL (ref 4.5–11.5)

## 2024-07-01 PROCEDURE — 7100000001 HC PACU RECOVERY - ADDTL 15 MIN: Performed by: SURGERY

## 2024-07-01 PROCEDURE — 85025 COMPLETE CBC W/AUTO DIFF WBC: CPT

## 2024-07-01 PROCEDURE — 99231 SBSQ HOSP IP/OBS SF/LOW 25: CPT | Performed by: INTERNAL MEDICINE

## 2024-07-01 PROCEDURE — 6360000002 HC RX W HCPCS: Performed by: ANESTHESIOLOGY

## 2024-07-01 PROCEDURE — 88307 TISSUE EXAM BY PATHOLOGIST: CPT

## 2024-07-01 PROCEDURE — 2500000003 HC RX 250 WO HCPCS: Performed by: NURSE ANESTHETIST, CERTIFIED REGISTERED

## 2024-07-01 PROCEDURE — 88112 CYTOPATH CELL ENHANCE TECH: CPT

## 2024-07-01 PROCEDURE — 6360000002 HC RX W HCPCS: Performed by: NURSE ANESTHETIST, CERTIFIED REGISTERED

## 2024-07-01 PROCEDURE — 99223 1ST HOSP IP/OBS HIGH 75: CPT | Performed by: SURGERY

## 2024-07-01 PROCEDURE — 3700000001 HC ADD 15 MINUTES (ANESTHESIA): Performed by: SURGERY

## 2024-07-01 PROCEDURE — 2580000003 HC RX 258: Performed by: SURGERY

## 2024-07-01 PROCEDURE — 2709999900 HC NON-CHARGEABLE SUPPLY: Performed by: SURGERY

## 2024-07-01 PROCEDURE — 3600000004 HC SURGERY LEVEL 4 BASE: Performed by: SURGERY

## 2024-07-01 PROCEDURE — 0DTN0ZZ RESECTION OF SIGMOID COLON, OPEN APPROACH: ICD-10-PCS | Performed by: SURGERY

## 2024-07-01 PROCEDURE — 2580000003 HC RX 258: Performed by: NURSE ANESTHETIST, CERTIFIED REGISTERED

## 2024-07-01 PROCEDURE — 0W9G0ZZ DRAINAGE OF PERITONEAL CAVITY, OPEN APPROACH: ICD-10-PCS | Performed by: SURGERY

## 2024-07-01 PROCEDURE — 3700000000 HC ANESTHESIA ATTENDED CARE: Performed by: SURGERY

## 2024-07-01 PROCEDURE — 0TQB0ZZ REPAIR BLADDER, OPEN APPROACH: ICD-10-PCS | Performed by: SURGERY

## 2024-07-01 PROCEDURE — 36415 COLL VENOUS BLD VENIPUNCTURE: CPT

## 2024-07-01 PROCEDURE — 6370000000 HC RX 637 (ALT 250 FOR IP): Performed by: SURGERY

## 2024-07-01 PROCEDURE — 6360000002 HC RX W HCPCS: Performed by: SURGERY

## 2024-07-01 PROCEDURE — 88305 TISSUE EXAM BY PATHOLOGIST: CPT

## 2024-07-01 PROCEDURE — 1200000000 HC SEMI PRIVATE

## 2024-07-01 PROCEDURE — 93010 ELECTROCARDIOGRAM REPORT: CPT | Performed by: INTERNAL MEDICINE

## 2024-07-01 PROCEDURE — 3600000014 HC SURGERY LEVEL 4 ADDTL 15MIN: Performed by: SURGERY

## 2024-07-01 PROCEDURE — 0D1M0Z4 BYPASS DESCENDING COLON TO CUTANEOUS, OPEN APPROACH: ICD-10-PCS | Performed by: SURGERY

## 2024-07-01 PROCEDURE — 80053 COMPREHEN METABOLIC PANEL: CPT

## 2024-07-01 PROCEDURE — 2720000010 HC SURG SUPPLY STERILE: Performed by: SURGERY

## 2024-07-01 PROCEDURE — 7100000000 HC PACU RECOVERY - FIRST 15 MIN: Performed by: SURGERY

## 2024-07-01 RX ORDER — FENTANYL CITRATE 50 UG/ML
INJECTION, SOLUTION INTRAMUSCULAR; INTRAVENOUS PRN
Status: DISCONTINUED | OUTPATIENT
Start: 2024-07-01 | End: 2024-07-01 | Stop reason: SDUPTHER

## 2024-07-01 RX ORDER — KETOROLAC TROMETHAMINE 15 MG/ML
15 INJECTION, SOLUTION INTRAMUSCULAR; INTRAVENOUS EVERY 6 HOURS
Status: DISPENSED | OUTPATIENT
Start: 2024-07-01 | End: 2024-07-02

## 2024-07-01 RX ORDER — TRAMADOL HYDROCHLORIDE 50 MG/1
25 TABLET ORAL EVERY 6 HOURS PRN
Status: DISCONTINUED | OUTPATIENT
Start: 2024-07-01 | End: 2024-07-05

## 2024-07-01 RX ORDER — DEXAMETHASONE SODIUM PHOSPHATE 10 MG/ML
INJECTION, SOLUTION INTRAMUSCULAR; INTRAVENOUS PRN
Status: DISCONTINUED | OUTPATIENT
Start: 2024-07-01 | End: 2024-07-01 | Stop reason: SDUPTHER

## 2024-07-01 RX ORDER — ONDANSETRON 2 MG/ML
4 INJECTION INTRAMUSCULAR; INTRAVENOUS
Status: DISCONTINUED | OUTPATIENT
Start: 2024-07-01 | End: 2024-07-01 | Stop reason: HOSPADM

## 2024-07-01 RX ORDER — TRAMADOL HYDROCHLORIDE 50 MG/1
50 TABLET ORAL EVERY 6 HOURS PRN
Status: DISCONTINUED | OUTPATIENT
Start: 2024-07-01 | End: 2024-07-05

## 2024-07-01 RX ORDER — SODIUM CHLORIDE 9 MG/ML
INJECTION, SOLUTION INTRAVENOUS CONTINUOUS PRN
Status: DISCONTINUED | OUTPATIENT
Start: 2024-07-01 | End: 2024-07-01 | Stop reason: SDUPTHER

## 2024-07-01 RX ORDER — NALOXONE HYDROCHLORIDE 0.4 MG/ML
INJECTION, SOLUTION INTRAMUSCULAR; INTRAVENOUS; SUBCUTANEOUS PRN
Status: DISCONTINUED | OUTPATIENT
Start: 2024-07-01 | End: 2024-07-01 | Stop reason: HOSPADM

## 2024-07-01 RX ORDER — MIDAZOLAM HYDROCHLORIDE 1 MG/ML
INJECTION INTRAMUSCULAR; INTRAVENOUS PRN
Status: DISCONTINUED | OUTPATIENT
Start: 2024-07-01 | End: 2024-07-01 | Stop reason: SDUPTHER

## 2024-07-01 RX ORDER — HYDRALAZINE HYDROCHLORIDE 20 MG/ML
10 INJECTION INTRAMUSCULAR; INTRAVENOUS
Status: DISCONTINUED | OUTPATIENT
Start: 2024-07-01 | End: 2024-07-01 | Stop reason: HOSPADM

## 2024-07-01 RX ORDER — MIDAZOLAM HYDROCHLORIDE 1 MG/ML
2 INJECTION INTRAMUSCULAR; INTRAVENOUS
Status: DISCONTINUED | OUTPATIENT
Start: 2024-07-01 | End: 2024-07-01 | Stop reason: HOSPADM

## 2024-07-01 RX ORDER — LIDOCAINE HYDROCHLORIDE 20 MG/ML
INJECTION, SOLUTION INTRAVENOUS PRN
Status: DISCONTINUED | OUTPATIENT
Start: 2024-07-01 | End: 2024-07-01 | Stop reason: SDUPTHER

## 2024-07-01 RX ORDER — MORPHINE SULFATE 2 MG/ML
2 INJECTION, SOLUTION INTRAMUSCULAR; INTRAVENOUS EVERY 5 MIN PRN
Status: DISCONTINUED | OUTPATIENT
Start: 2024-07-01 | End: 2024-07-01 | Stop reason: HOSPADM

## 2024-07-01 RX ORDER — PROCHLORPERAZINE EDISYLATE 5 MG/ML
5 INJECTION INTRAMUSCULAR; INTRAVENOUS
Status: DISCONTINUED | OUTPATIENT
Start: 2024-07-01 | End: 2024-07-01 | Stop reason: HOSPADM

## 2024-07-01 RX ORDER — SUCCINYLCHOLINE/SOD CL,ISO/PF 200MG/10ML
SYRINGE (ML) INTRAVENOUS PRN
Status: DISCONTINUED | OUTPATIENT
Start: 2024-07-01 | End: 2024-07-01 | Stop reason: SDUPTHER

## 2024-07-01 RX ORDER — METHOCARBAMOL 500 MG/1
500 TABLET, FILM COATED ORAL 4 TIMES DAILY
Status: DISCONTINUED | OUTPATIENT
Start: 2024-07-01 | End: 2024-07-07

## 2024-07-01 RX ORDER — KETOROLAC TROMETHAMINE 15 MG/ML
15 INJECTION, SOLUTION INTRAMUSCULAR; INTRAVENOUS
Status: COMPLETED | OUTPATIENT
Start: 2024-07-01 | End: 2024-07-01

## 2024-07-01 RX ORDER — DIPHENHYDRAMINE HYDROCHLORIDE 50 MG/ML
12.5 INJECTION INTRAMUSCULAR; INTRAVENOUS
Status: DISCONTINUED | OUTPATIENT
Start: 2024-07-01 | End: 2024-07-01 | Stop reason: HOSPADM

## 2024-07-01 RX ORDER — IPRATROPIUM BROMIDE AND ALBUTEROL SULFATE 2.5; .5 MG/3ML; MG/3ML
1 SOLUTION RESPIRATORY (INHALATION)
Status: DISCONTINUED | OUTPATIENT
Start: 2024-07-01 | End: 2024-07-01 | Stop reason: HOSPADM

## 2024-07-01 RX ORDER — METHOCARBAMOL 100 MG/ML
1000 INJECTION, SOLUTION INTRAMUSCULAR; INTRAVENOUS ONCE
Status: COMPLETED | OUTPATIENT
Start: 2024-07-01 | End: 2024-07-01

## 2024-07-01 RX ORDER — MEPERIDINE HYDROCHLORIDE 25 MG/ML
12.5 INJECTION INTRAMUSCULAR; INTRAVENOUS; SUBCUTANEOUS EVERY 5 MIN PRN
Status: DISCONTINUED | OUTPATIENT
Start: 2024-07-01 | End: 2024-07-01 | Stop reason: HOSPADM

## 2024-07-01 RX ORDER — ROCURONIUM BROMIDE 10 MG/ML
INJECTION, SOLUTION INTRAVENOUS PRN
Status: DISCONTINUED | OUTPATIENT
Start: 2024-07-01 | End: 2024-07-01 | Stop reason: SDUPTHER

## 2024-07-01 RX ORDER — LABETALOL HYDROCHLORIDE 5 MG/ML
10 INJECTION, SOLUTION INTRAVENOUS
Status: DISCONTINUED | OUTPATIENT
Start: 2024-07-01 | End: 2024-07-01 | Stop reason: HOSPADM

## 2024-07-01 RX ORDER — PROPOFOL 10 MG/ML
INJECTION, EMULSION INTRAVENOUS PRN
Status: DISCONTINUED | OUTPATIENT
Start: 2024-07-01 | End: 2024-07-01 | Stop reason: SDUPTHER

## 2024-07-01 RX ORDER — ONDANSETRON 2 MG/ML
INJECTION INTRAMUSCULAR; INTRAVENOUS PRN
Status: DISCONTINUED | OUTPATIENT
Start: 2024-07-01 | End: 2024-07-01 | Stop reason: SDUPTHER

## 2024-07-01 RX ORDER — LABETALOL HYDROCHLORIDE 5 MG/ML
INJECTION, SOLUTION INTRAVENOUS PRN
Status: DISCONTINUED | OUTPATIENT
Start: 2024-07-01 | End: 2024-07-01 | Stop reason: SDUPTHER

## 2024-07-01 RX ADMIN — FENTANYL CITRATE 50 MCG: 50 INJECTION, SOLUTION INTRAMUSCULAR; INTRAVENOUS at 11:56

## 2024-07-01 RX ADMIN — ROCURONIUM BROMIDE 30 MG: 10 INJECTION, SOLUTION INTRAVENOUS at 11:50

## 2024-07-01 RX ADMIN — FENTANYL CITRATE 50 MCG: 50 INJECTION, SOLUTION INTRAMUSCULAR; INTRAVENOUS at 12:05

## 2024-07-01 RX ADMIN — METRONIDAZOLE 500 MG: 500 INJECTION, SOLUTION INTRAVENOUS at 21:29

## 2024-07-01 RX ADMIN — MIDAZOLAM 2 MG: 1 INJECTION INTRAMUSCULAR; INTRAVENOUS at 11:35

## 2024-07-01 RX ADMIN — KETOROLAC TROMETHAMINE 15 MG: 15 INJECTION, SOLUTION INTRAMUSCULAR; INTRAVENOUS at 06:38

## 2024-07-01 RX ADMIN — KETOROLAC TROMETHAMINE 15 MG: 15 INJECTION, SOLUTION INTRAMUSCULAR; INTRAVENOUS at 21:30

## 2024-07-01 RX ADMIN — MORPHINE SULFATE 4 MG: 4 INJECTION, SOLUTION INTRAMUSCULAR; INTRAVENOUS at 21:30

## 2024-07-01 RX ADMIN — PROPOFOL 150 MG: 10 INJECTION, EMULSION INTRAVENOUS at 11:41

## 2024-07-01 RX ADMIN — HYDROMORPHONE HYDROCHLORIDE 0.5 MG: 1 INJECTION, SOLUTION INTRAMUSCULAR; INTRAVENOUS; SUBCUTANEOUS at 14:10

## 2024-07-01 RX ADMIN — METRONIDAZOLE 500 MG: 500 INJECTION, SOLUTION INTRAVENOUS at 04:22

## 2024-07-01 RX ADMIN — SODIUM CHLORIDE, PRESERVATIVE FREE 10 ML: 5 INJECTION INTRAVENOUS at 21:43

## 2024-07-01 RX ADMIN — SODIUM CHLORIDE: 9 INJECTION, SOLUTION INTRAVENOUS at 11:33

## 2024-07-01 RX ADMIN — DEXAMETHASONE SODIUM PHOSPHATE 10 MG: 10 INJECTION, SOLUTION INTRAMUSCULAR; INTRAVENOUS at 11:47

## 2024-07-01 RX ADMIN — LIDOCAINE HYDROCHLORIDE 60 MG: 20 INJECTION, SOLUTION INTRAVENOUS at 11:41

## 2024-07-01 RX ADMIN — FENTANYL CITRATE 50 MCG: 50 INJECTION, SOLUTION INTRAMUSCULAR; INTRAVENOUS at 12:26

## 2024-07-01 RX ADMIN — ROCURONIUM BROMIDE 10 MG: 10 INJECTION, SOLUTION INTRAVENOUS at 11:41

## 2024-07-01 RX ADMIN — METHOCARBAMOL 1000 MG: 100 INJECTION INTRAMUSCULAR; INTRAVENOUS at 13:46

## 2024-07-01 RX ADMIN — FENTANYL CITRATE 50 MCG: 50 INJECTION, SOLUTION INTRAMUSCULAR; INTRAVENOUS at 11:41

## 2024-07-01 RX ADMIN — FENTANYL CITRATE 50 MCG: 50 INJECTION, SOLUTION INTRAMUSCULAR; INTRAVENOUS at 12:10

## 2024-07-01 RX ADMIN — LABETALOL HYDROCHLORIDE 5 MG: 5 INJECTION, SOLUTION INTRAVENOUS at 13:03

## 2024-07-01 RX ADMIN — HYDROMORPHONE HYDROCHLORIDE 0.5 MG: 1 INJECTION, SOLUTION INTRAMUSCULAR; INTRAVENOUS; SUBCUTANEOUS at 13:33

## 2024-07-01 RX ADMIN — FENTANYL CITRATE 50 MCG: 50 INJECTION, SOLUTION INTRAMUSCULAR; INTRAVENOUS at 11:59

## 2024-07-01 RX ADMIN — SUGAMMADEX 100 MG: 100 INJECTION, SOLUTION INTRAVENOUS at 12:58

## 2024-07-01 RX ADMIN — KETOROLAC TROMETHAMINE 15 MG: 15 INJECTION, SOLUTION INTRAMUSCULAR; INTRAVENOUS at 13:49

## 2024-07-01 RX ADMIN — LABETALOL HYDROCHLORIDE 5 MG: 5 INJECTION, SOLUTION INTRAVENOUS at 12:06

## 2024-07-01 RX ADMIN — MORPHINE SULFATE 2 MG: 2 INJECTION, SOLUTION INTRAMUSCULAR; INTRAVENOUS at 05:22

## 2024-07-01 RX ADMIN — Medication 120 MG: at 11:41

## 2024-07-01 RX ADMIN — MORPHINE SULFATE 4 MG: 4 INJECTION, SOLUTION INTRAMUSCULAR; INTRAVENOUS at 15:29

## 2024-07-01 RX ADMIN — METHOCARBAMOL 500 MG: 500 TABLET ORAL at 21:32

## 2024-07-01 RX ADMIN — LABETALOL HYDROCHLORIDE 5 MG: 5 INJECTION, SOLUTION INTRAVENOUS at 13:00

## 2024-07-01 RX ADMIN — KETOROLAC TROMETHAMINE 15 MG: 15 INJECTION, SOLUTION INTRAMUSCULAR; INTRAVENOUS at 01:27

## 2024-07-01 RX ADMIN — LABETALOL HYDROCHLORIDE 5 MG: 5 INJECTION, SOLUTION INTRAVENOUS at 12:16

## 2024-07-01 RX ADMIN — WATER 1000 MG: 1 INJECTION INTRAMUSCULAR; INTRAVENOUS; SUBCUTANEOUS at 09:18

## 2024-07-01 RX ADMIN — ONDANSETRON 4 MG: 2 INJECTION INTRAMUSCULAR; INTRAVENOUS at 12:52

## 2024-07-01 RX ADMIN — METHOCARBAMOL 500 MG: 500 TABLET ORAL at 17:23

## 2024-07-01 ASSESSMENT — PAIN DESCRIPTION - DESCRIPTORS
DESCRIPTORS: PATIENT UNABLE TO DESCRIBE
DESCRIPTORS: ACHING;PRESSURE;DISCOMFORT
DESCRIPTORS: ACHING;BURNING
DESCRIPTORS: ACHING;STABBING;SORE
DESCRIPTORS: DISCOMFORT
DESCRIPTORS: ACHING;PRESSURE
DESCRIPTORS: ACHING;DULL;PENETRATING
DESCRIPTORS: PATIENT UNABLE TO DESCRIBE
DESCRIPTORS: ACHING;SHARP;CRAMPING

## 2024-07-01 ASSESSMENT — PAIN SCALES - GENERAL
PAINLEVEL_OUTOF10: 0
PAINLEVEL_OUTOF10: 7
PAINLEVEL_OUTOF10: 7
PAINLEVEL_OUTOF10: 8
PAINLEVEL_OUTOF10: 5
PAINLEVEL_OUTOF10: 5
PAINLEVEL_OUTOF10: 0
PAINLEVEL_OUTOF10: 8
PAINLEVEL_OUTOF10: 8
PAINLEVEL_OUTOF10: 5
PAINLEVEL_OUTOF10: 6
PAINLEVEL_OUTOF10: 3
PAINLEVEL_OUTOF10: 7

## 2024-07-01 ASSESSMENT — PAIN - FUNCTIONAL ASSESSMENT: PAIN_FUNCTIONAL_ASSESSMENT: PREVENTS OR INTERFERES WITH MANY ACTIVE NOT PASSIVE ACTIVITIES

## 2024-07-01 ASSESSMENT — PAIN DESCRIPTION - LOCATION
LOCATION: ABDOMEN

## 2024-07-01 ASSESSMENT — PAIN DESCRIPTION - PAIN TYPE: TYPE: SURGICAL PAIN

## 2024-07-01 ASSESSMENT — PAIN DESCRIPTION - ORIENTATION
ORIENTATION: RIGHT;LEFT

## 2024-07-01 ASSESSMENT — LIFESTYLE VARIABLES: SMOKING_STATUS: 1

## 2024-07-01 ASSESSMENT — PAIN SCALES - WONG BAKER: WONGBAKER_NUMERICALRESPONSE: NO HURT

## 2024-07-01 NOTE — CARE COORDINATION
Case Management Assessment  Initial Evaluation    Date/Time of Evaluation: 7/1/2024 10:39 AM  Assessment Completed by: Jeanne Gandara RN    If patient is discharged prior to next notation, then this note serves as note for discharge by case management.    Patient Name: Cherrie Weems                   YOB: 1960  Diagnosis: Pneumoperitoneum [K66.8]  Diverticulitis of colon [K57.32]  Perforated viscus [R19.8]  Colonic mass [K63.89]                   Date / Time: 6/30/2024  4:41 PM    Patient Admission Status: Inpatient   Readmission Risk (Low < 19, Mod (19-27), High > 27): Readmission Risk Score: 7.4    Current PCP: Janette Arellano MD  PCP verified by CM? Yes    Chart Reviewed: Yes      History Provided by: Patient  Patient Orientation: Alert and Oriented    Patient Cognition: Alert    Hospitalization in the last 30 days (Readmission):  No    If yes, Readmission Assessment in CM Navigator will be completed.    Advance Directives:      Code Status: Full Code   Patient's Primary Decision Maker is: Legal Next of Kin    Primary Decision Maker: IanNayeli - Brother/Sister - [phone]    Discharge Planning:    Patient lives with: Spouse/Significant Other Type of Home: House  Primary Care Giver: Self  Patient Support Systems include: Spouse/Significant Other, Family Members   ADLS  Prior functional level: Independent in ADLs/IADLs  Current functional level: Independent in ADLs/IADLs      Family can provide assistance at DC: Yes  Would you like Case Management to discuss the discharge plan with any other family members/significant others, and if so, who? No  Plans to Return to Present Housing: Yes  Other Identified Issues/Barriers to RETURNING to current housing: none         Financial    Payor: [employer] / Plan: [employer] [address] / Product Type: *No Product type* /         [facility] - [place] - [address] - P [phone] - F [phone]  [address]  [place] [address]  Phone: [phone] Fax: [phone]    [id number] - [place], [place] - [address] - P [phone] - F [phone]  [address]  [place] [facility] [address]  Phone: [phone] Fax: [phone]      Notes:    Factors facilitating achievement of predicted outcomes: Family support    Barriers to discharge: none    Additional Case Management Notes: 7-1-Cm note: met with pt for transition of care, pt lives with her S/O Matt for >35 years, pt is independent, has no DME , no hx of [facility], [place] or [facility]. Pt denies any needs for home, cm following for post op to see if needs are present, pt states she will not need home care if she gets an ostomy. Electronically signed by Jeanne Gandara RN on 7/1/2024 at 10:42 AM       The Plan for Transition of Care is related to the following treatment goals of Pneumoperitoneum [K66.8]  Diverticulitis of colon [K57.32]  Perforated viscus [R19.8]  Colonic mass [K63.89]      Jeanne Gandara RN  Case Management Department  Ph: [phone] Fax: [phone]

## 2024-07-01 NOTE — BRIEF OP NOTE
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       Procedure(s):  EXPLORATORY LAPAROTOMY SIGMOID COLON RESECTION POSS COLOSTOMY    Surgeon(s):  Abdi Day MD    Assistant:  First Assistant: Moreno Suggs    Anesthesia: Choice    Estimated Blood Loss (mL): Minimal    Complications: None    Specimens:   ID Type Source Tests Collected by Time Destination   A : PERITONEAL FLUID Body Fluid Fluid CYTOLOGY, NON-GYN Abdi Day MD 7/1/2024 1200    B : SIGMOID COLON Tissue Tissue SURGICAL PATHOLOGY Abdi Day MD 7/1/2024 1220        Implants:  * No implants in log *      Drains: * No LDAs found *    Findings:  Infection Present At Time Of Surgery (PATOS) (choose all levels that have infection present):  - Organ Space infection (below fascia) present as evidenced by abscess, pus, purulent fluid, and feculent peritonitis  Other Findings: as dictated  [id number]    Electronically signed by Abdi Day MD on 7/1/2024 at 12:58 PM

## 2024-07-01 NOTE — PROGRESS NOTES
4 Eyes Skin Assessment     NAME:  Cherrie Weems  YOB: 1960  MEDICAL RECORD NUMBER:  [id number]    The patient is being assessed for  Admission    I agree that at least one RN has performed a thorough Head to Toe Skin Assessment on the patient. ALL assessment sites listed below have been assessed.      Areas assessed by both nurses:    Head, Face, Ears, Shoulders, Back, Chest, Arms, Elbows, Hands, Sacrum. Buttock, Coccyx, Ischium, and Legs. Feet and Heels        Does the Patient have a Wound? No noted wound(s)       Jose Prevention initiated by RN: No  Wound Care Orders initiated by RN: No    Pressure Injury (Stage 3,4, Unstageable, DTI, NWPT, and Complex wounds) if present, place Wound referral order by RN under : No    New Ostomies, if present place, Ostomy referral order under : No     Nurse 1 eSignature: Electronically signed by Pricilla Summers RN on 6/30/24 at 11:23 PM EDT    **SHARE this note so that the co-signing nurse can place an eSignature**    Nurse 2 eSignature: Electronically signed by Monica Smith RN on 6/30/24 at 11:24 PM EDT

## 2024-07-01 NOTE — ANESTHESIA PRE PROCEDURE
viscus R19.8       Past Medical History:  History reviewed. No pertinent past medical history.    Past Surgical History:  History reviewed. No pertinent surgical history.    Social History:    Social History     Tobacco Use    Smoking status: Every Day     Current packs/day: 0.50     Average packs/day: 0.5 packs/day for 40.0 years (20.0 ttl pk-yrs)     Types: Cigarettes    Smokeless tobacco: Never   Substance Use Topics    Alcohol use: Not Currently                                Ready to quit: Not Answered  Counseling given: Not Answered      Vital Signs (Current):   Vitals:    06/30/24 2013 06/30/24 2015 07/01/24 0511 07/01/24 0552   BP: (!) 164/76 (!) 164/76 132/67    Pulse: 79 79 86    Resp:  16 16 16   Temp:  97.9 °F (36.6 °C) 98.6 °F (37 °C)    TempSrc:  Oral Oral    SpO2:   99%    Weight:  49.9 kg (110 lb)     Height:  1.676 m (5' 6\")                                                BP Readings from Last 3 Encounters:   07/01/24 132/67   06/22/24 120/60   05/14/24 124/68       NPO Status: Time of last liquid consumption: 0000                        Time of last solid consumption: 0000                        Date of last liquid consumption: 07/01/24                        Date of last solid food consumption: 07/01/24    BMI:   Wt Readings from Last 3 Encounters:   06/30/24 49.9 kg (110 lb)   06/22/24 49.9 kg (110 lb)   05/14/24 51.4 kg (113 lb 6.4 oz)     Body mass index is 17.75 kg/m².    CBC:   Lab Results   Component Value Date/Time    WBC 13.1 07/01/2024 04:28 AM    RBC 3.87 07/01/2024 04:28 AM    HGB 11.9 07/01/2024 04:28 AM    HCT 35.1 07/01/2024 04:28 AM    MCV 90.7 07/01/2024 04:28 AM    RDW 12.3 07/01/2024 04:28 AM     07/01/2024 04:28 AM       CMP:   Lab Results   Component Value Date/Time     07/01/2024 04:28 AM    K 4.2 07/01/2024 04:28 AM     07/01/2024 04:28 AM    CO2 23 07/01/2024 04:28 AM    BUN 9 07/01/2024 04:28 AM    CREATININE 0.7 07/01/2024 04:28 AM    LABGLOM >90

## 2024-07-01 NOTE — ANESTHESIA POSTPROCEDURE EVALUATION
Department of Anesthesiology  Postprocedure Note    Patient: Cherrie eWems  MRN: [id number]  YOB: 1960  Date of evaluation: 7/1/2024    Procedure Summary       Date: 07/01/24 Room / Location: [address]    Anesthesia Start: 1133 Anesthesia Stop: 1313    Procedure: EXPLORATORY LAPAROTOMY SIGMOID COLON RESECTION POSS COLOSTOMY (Abdomen) Diagnosis:       Diverticulitis      (Diverticulitis [K57.92])    Surgeons: Abdi Day MD Responsible Provider: Manuelito Chiu MD    Anesthesia Type: General ASA Status: 2            Anesthesia Type: General    James Phase I: James Score: 8    James Phase II:      Anesthesia Post Evaluation    Patient location during evaluation: PACU  Patient participation: complete - patient participated  Level of consciousness: awake  Airway patency: patent  Nausea & Vomiting: no nausea and no vomiting  Cardiovascular status: hemodynamically stable  Respiratory status: acceptable  Hydration status: euvolemic  Pain management: adequate    No notable events documented.

## 2024-07-01 NOTE — H&P
General Surgery History and Physical    Patient's Name/Date of Birth: Cherrie Weems / 1960    Date: July 1, 2024     Surgeon: Abdi Day M.D.    PCP: Janette Arellano MD     Chief Complaint: abd pain    HPI:   Cherrie Weems is a 64 y.o. female who presents for evaluation of abd pain and CT in ED done for this showed severe perforated diverticulitis. Timing is constant, radiation to low abd, alleviated by nothing but pain meds and started days ago and severity is 2-9/10      History reviewed. No pertinent past medical history.    History reviewed. No pertinent surgical history.    Current Facility-Administered Medications   Medication Dose Route Frequency Provider Last Rate Last Admin    sodium chloride flush 0.9 % injection 5-40 mL  5-40 mL IntraVENous 2 times per day Abdi Day MD        sodium chloride flush 0.9 % injection 5-40 mL  5-40 mL IntraVENous PRN Abdi Day MD        0.9 % sodium chloride infusion   IntraVENous PRN Abdi Day MD        potassium chloride (KLOR-CON M) extended release tablet 40 mEq  40 mEq Oral PRN Abdi Day MD        Or    potassium bicarb-citric acid (EFFER-K) effervescent tablet 40 mEq  40 mEq Oral PRN Abdi Day MD        Or    potassium chloride 10 mEq/100 mL IVPB (Peripheral Line)  10 mEq IntraVENous PRN Abid Day MD        magnesium sulfate 2000 mg in 50 mL IVPB premix  2,000 mg IntraVENous PRN Abdi Day MD        ondansetron (ZOFRAN-ODT) disintegrating tablet 4 mg  4 mg Oral Q8H PRN Abdi Day MD        Or    ondansetron (ZOFRAN) injection 4 mg  4 mg IntraVENous Q6H PRN Abdi Day MD        enoxaparin (LOVENOX) injection 40 mg  40 mg SubCUTAneous Daily Abdi Day MD        lactated ringers IV soln infusion   IntraVENous Continuous Abdi Day  mL/hr at 06/30/24 2037 New Bag at 06/30/24 2037    cefTRIAXone (ROCEPHIN) 1,000 mg in sterile water 10 mL IV syringe  1,000 mg IntraVENous Daily

## 2024-07-01 NOTE — PROGRESS NOTES
Progress  Note  Chief Complaint   Patient presents with    Dysuria     With urinary urgency, reports she has been seen at  for UTI and given an antibiotic, symptoms not improving      Historical Issues:  64 y.o. female with a history of HTN, HLD presents with abdominal pain.  Workup in ED significant for Na 127, CO2 21, glucose 125, WBC 14.8.  UA leukocyte esterase positive, though improvement from UA on 6/22 when patient was started on cefdinir and pyridium.  CT abd/pelvis showed mass like inflammatory formation mid/distal colon with multiple perforated diverticular formations as well as free air in the peritoneal cavity, unable to rule out malignant lesion, multiple hypo enhancing liver lesions.  Patient reports anxiety, has never had surgery before.  Last colonoscopy was >2 years ago and she had polyps removed at that time.  Sister had glioblastoma, no family history of colon cancer.  Denies recent weight loss.   Current Facility-Administered Medications   Medication Dose Route Frequency Provider Last Rate Last Admin    traMADol (ULTRAM) tablet 25 mg  25 mg Oral Q6H PRN Abdi Day MD        Or    traMADol (ULTRAM) tablet 50 mg  50 mg Oral Q6H PRN Abdi Day MD        ketorolac (TORADOL) injection 15 mg  15 mg IntraVENous Q6H Abdi Day MD        methocarbamol (ROBAXIN) tablet 500 mg  500 mg Oral 4x Daily Abdi Day MD        sodium chloride flush 0.9 % injection 5-40 mL  5-40 mL IntraVENous 2 times per day Abdi Day MD        sodium chloride flush 0.9 % injection 5-40 mL  5-40 mL IntraVENous PRN Abdi Day MD        0.9 % sodium chloride infusion   IntraVENous PRN Abdi Day MD        potassium chloride (KLOR-CON M) extended release tablet 40 mEq  40 mEq Oral PRN Abdi Dya MD        Or    potassium bicarb-citric acid (EFFER-K) effervescent tablet 40 mEq  40 mEq Oral PRN Abdi Day MD        Or    potassium chloride 10 mEq/100 mL IVPB

## 2024-07-02 LAB
ANION GAP SERPL CALCULATED.3IONS-SCNC: 12 MMOL/L (ref 7–16)
BASOPHILS # BLD: 0 K/UL (ref 0–0.2)
BASOPHILS NFR BLD: 0 % (ref 0–2)
BUN SERPL-MCNC: 13 MG/DL (ref 6–23)
CALCIUM SERPL-MCNC: 8.5 MG/DL (ref 8.6–10.2)
CHLORIDE SERPL-SCNC: 104 MMOL/L (ref 98–107)
CO2 SERPL-SCNC: 21 MMOL/L (ref 22–29)
CREAT SERPL-MCNC: 0.9 MG/DL (ref 0.5–1)
EOSINOPHIL # BLD: 0 K/UL (ref 0.05–0.5)
EOSINOPHILS RELATIVE PERCENT: 0 % (ref 0–6)
ERYTHROCYTE [DISTWIDTH] IN BLOOD BY AUTOMATED COUNT: 12.9 % (ref 11.5–15)
GFR, ESTIMATED: 71 ML/MIN/1.73M2
GLUCOSE SERPL-MCNC: 121 MG/DL (ref 74–99)
HCT VFR BLD AUTO: 29.8 % (ref 34–48)
HGB BLD-MCNC: 9.7 G/DL (ref 11.5–15.5)
LYMPHOCYTES NFR BLD: 0.62 K/UL (ref 1.5–4)
LYMPHOCYTES RELATIVE PERCENT: 5 % (ref 20–42)
MCH RBC QN AUTO: 30.6 PG (ref 26–35)
MCHC RBC AUTO-ENTMCNC: 32.6 G/DL (ref 32–34.5)
MCV RBC AUTO: 94 FL (ref 80–99.9)
MONOCYTES NFR BLD: 0.1 K/UL (ref 0.1–0.95)
MONOCYTES NFR BLD: 1 % (ref 2–12)
NEUTROPHILS NFR BLD: 94 % (ref 43–80)
NEUTS SEG NFR BLD: 11.48 K/UL (ref 1.8–7.3)
PLATELET # BLD AUTO: 298 K/UL (ref 130–450)
PMV BLD AUTO: 9.4 FL (ref 7–12)
POTASSIUM SERPL-SCNC: 3.9 MMOL/L (ref 3.5–5)
RBC # BLD AUTO: 3.17 M/UL (ref 3.5–5.5)
RBC # BLD: ABNORMAL 10*6/UL
SODIUM SERPL-SCNC: 137 MMOL/L (ref 132–146)
WBC OTHER # BLD: 12.2 K/UL (ref 4.5–11.5)

## 2024-07-02 PROCEDURE — 85025 COMPLETE CBC W/AUTO DIFF WBC: CPT

## 2024-07-02 PROCEDURE — 99024 POSTOP FOLLOW-UP VISIT: CPT | Performed by: SURGERY

## 2024-07-02 PROCEDURE — 99232 SBSQ HOSP IP/OBS MODERATE 35: CPT | Performed by: INTERNAL MEDICINE

## 2024-07-02 PROCEDURE — 6360000002 HC RX W HCPCS: Performed by: SURGERY

## 2024-07-02 PROCEDURE — 80048 BASIC METABOLIC PNL TOTAL CA: CPT

## 2024-07-02 PROCEDURE — 6370000000 HC RX 637 (ALT 250 FOR IP): Performed by: SURGERY

## 2024-07-02 PROCEDURE — 1200000000 HC SEMI PRIVATE

## 2024-07-02 PROCEDURE — 36415 COLL VENOUS BLD VENIPUNCTURE: CPT

## 2024-07-02 PROCEDURE — 2580000003 HC RX 258: Performed by: SURGERY

## 2024-07-02 RX ADMIN — SODIUM CHLORIDE, POTASSIUM CHLORIDE, SODIUM LACTATE AND CALCIUM CHLORIDE: 600; 310; 30; 20 INJECTION, SOLUTION INTRAVENOUS at 16:24

## 2024-07-02 RX ADMIN — METHOCARBAMOL 500 MG: 500 TABLET ORAL at 13:49

## 2024-07-02 RX ADMIN — METHOCARBAMOL 500 MG: 500 TABLET ORAL at 09:51

## 2024-07-02 RX ADMIN — MORPHINE SULFATE 4 MG: 4 INJECTION, SOLUTION INTRAMUSCULAR; INTRAVENOUS at 13:50

## 2024-07-02 RX ADMIN — ENOXAPARIN SODIUM 40 MG: 100 INJECTION SUBCUTANEOUS at 09:52

## 2024-07-02 RX ADMIN — METRONIDAZOLE 500 MG: 500 INJECTION, SOLUTION INTRAVENOUS at 12:20

## 2024-07-02 RX ADMIN — KETOROLAC TROMETHAMINE 15 MG: 15 INJECTION, SOLUTION INTRAMUSCULAR; INTRAVENOUS at 16:22

## 2024-07-02 RX ADMIN — MORPHINE SULFATE 4 MG: 4 INJECTION, SOLUTION INTRAMUSCULAR; INTRAVENOUS at 04:31

## 2024-07-02 RX ADMIN — KETOROLAC TROMETHAMINE 15 MG: 15 INJECTION, SOLUTION INTRAMUSCULAR; INTRAVENOUS at 09:51

## 2024-07-02 RX ADMIN — METRONIDAZOLE 500 MG: 500 INJECTION, SOLUTION INTRAVENOUS at 04:24

## 2024-07-02 RX ADMIN — METRONIDAZOLE 500 MG: 500 INJECTION, SOLUTION INTRAVENOUS at 20:47

## 2024-07-02 RX ADMIN — MORPHINE SULFATE 4 MG: 4 INJECTION, SOLUTION INTRAMUSCULAR; INTRAVENOUS at 20:43

## 2024-07-02 RX ADMIN — KETOROLAC TROMETHAMINE 15 MG: 15 INJECTION, SOLUTION INTRAMUSCULAR; INTRAVENOUS at 02:53

## 2024-07-02 RX ADMIN — METHOCARBAMOL 500 MG: 500 TABLET ORAL at 20:45

## 2024-07-02 RX ADMIN — TRAMADOL HYDROCHLORIDE 50 MG: 50 TABLET ORAL at 12:18

## 2024-07-02 RX ADMIN — WATER 1000 MG: 1 INJECTION INTRAMUSCULAR; INTRAVENOUS; SUBCUTANEOUS at 09:52

## 2024-07-02 ASSESSMENT — PAIN SCALES - GENERAL
PAINLEVEL_OUTOF10: 7
PAINLEVEL_OUTOF10: 7
PAINLEVEL_OUTOF10: 4
PAINLEVEL_OUTOF10: 6
PAINLEVEL_OUTOF10: 3
PAINLEVEL_OUTOF10: 8
PAINLEVEL_OUTOF10: 8
PAINLEVEL_OUTOF10: 5
PAINLEVEL_OUTOF10: 5
PAINLEVEL_OUTOF10: 7
PAINLEVEL_OUTOF10: 5
PAINLEVEL_OUTOF10: 3

## 2024-07-02 ASSESSMENT — PAIN DESCRIPTION - LOCATION
LOCATION: ABDOMEN

## 2024-07-02 ASSESSMENT — PAIN DESCRIPTION - PAIN TYPE
TYPE: ACUTE PAIN;SURGICAL PAIN

## 2024-07-02 ASSESSMENT — PAIN DESCRIPTION - FREQUENCY
FREQUENCY: CONTINUOUS
FREQUENCY: INTERMITTENT

## 2024-07-02 ASSESSMENT — PAIN DESCRIPTION - DESCRIPTORS
DESCRIPTORS: ACHING;DISCOMFORT;SORE
DESCRIPTORS: ACHING;DISCOMFORT;SORE
DESCRIPTORS: ACHING;POUNDING;PRESSURE
DESCRIPTORS: POUNDING;SHOOTING
DESCRIPTORS: ACHING;SORE;DISCOMFORT

## 2024-07-02 ASSESSMENT — PAIN DESCRIPTION - ORIENTATION
ORIENTATION: MID
ORIENTATION: INNER
ORIENTATION: INNER

## 2024-07-02 ASSESSMENT — PAIN DESCRIPTION - ONSET
ONSET: ON-GOING
ONSET: ON-GOING

## 2024-07-02 NOTE — PROGRESS NOTES
Progress  Note  Chief Complaint   Patient presents with    Dysuria     With urinary urgency, reports she has been seen at  for UTI and given an antibiotic, symptoms not improving      Historical Issues:  Current Facility-Administered Medications   Medication Dose Route Frequency Provider Last Rate Last Admin    traMADol (ULTRAM) tablet 25 mg  25 mg Oral Q6H PRN Abdi Day MD        Or    traMADol (ULTRAM) tablet 50 mg  50 mg Oral Q6H PRN Abdi Day MD   50 mg at 07/02/24 1218    ketorolac (TORADOL) injection 15 mg  15 mg IntraVENous Q6H Abdi Day MD   15 mg at 07/02/24 0951    methocarbamol (ROBAXIN) tablet 500 mg  500 mg Oral 4x Daily Abdi Day MD   500 mg at 07/02/24 0951    sodium chloride flush 0.9 % injection 5-40 mL  5-40 mL IntraVENous 2 times per day Abdi Day MD   10 mL at 07/01/24 2143    sodium chloride flush 0.9 % injection 5-40 mL  5-40 mL IntraVENous PRN Abdi Day MD        0.9 % sodium chloride infusion   IntraVENous PRN Abdi Day MD        potassium chloride (KLOR-CON M) extended release tablet 40 mEq  40 mEq Oral PRN Abdi Day MD        Or    potassium bicarb-citric acid (EFFER-K) effervescent tablet 40 mEq  40 mEq Oral PRN Abdi Day MD        Or    potassium chloride 10 mEq/100 mL IVPB (Peripheral Line)  10 mEq IntraVENous PRN Abdi Day MD        magnesium sulfate 2000 mg in 50 mL IVPB premix  2,000 mg IntraVENous PRN Abdi Day MD        ondansetron (ZOFRAN-ODT) disintegrating tablet 4 mg  4 mg Oral Q8H PRN Abdi Day MD        Or    ondansetron (ZOFRAN) injection 4 mg  4 mg IntraVENous Q6H PRN Abdi Day MD        enoxaparin (LOVENOX) injection 40 mg  40 mg SubCUTAneous Daily Abdi Day MD   40 mg at 07/02/24 0952    lactated ringers IV soln infusion   IntraVENous Continuous Abdi Day  mL/hr at 07/01/24 1301 Rate Change at 07/01/24 1301    cefTRIAXone (ROCEPHIN) 1,000 mg  in sterile water 10 mL IV syringe  1,000 mg IntraVENous Daily Abdi Day MD   1,000 mg at 07/02/24 0952    metroNIDAZOLE (FLAGYL) 500 mg in 0.9% NaCl 100 mL IVPB premix  500 mg IntraVENous Q8H Abdi Day  mL/hr at 07/02/24 1220 500 mg at 07/02/24 1220    morphine (PF) injection 2 mg  2 mg IntraVENous Q2H PRN Abdi Day MD   2 mg at 07/01/24 0522    Or    morphine injection 4 mg  4 mg IntraVENous Q2H PRN Abdi Day MD   4 mg at 07/02/24 0431    ipratropium 0.5 mg-albuterol 2.5 mg (DUONEB) nebulizer solution 1 Dose  1 Dose Inhalation Q4H PRN Abdi Day MD        diphenhydrAMINE (BENADRYL) injection 25 mg  25 mg IntraVENous Q6H PRN Abdi Day MD        nicotine (NICODERM CQ) 21 MG/24HR 1 patch  1 patch TransDERmal Daily Abdi Day MD   1 patch at 07/01/24 2141     Recent Complaints:  Review of Systems  Vitals:    07/02/24 1218   BP:    Pulse:    Resp: 16   Temp:    SpO2:      Physical Exam  Cardiovascular:      Rate and Rhythm: Normal rate and regular rhythm.   Pulmonary:      Effort: Pulmonary effort is normal.   Abdominal:      General: Abdomen is flat.   Musculoskeletal:      Right lower leg: No edema.      Left lower leg: No edema.   Neurological:      Mental Status: She is alert.         Recent Labs     06/30/24  1700 07/01/24  0428 07/02/24 0427   * 134 137   K 4.3 4.2 3.9   CL 90* 100 104   CO2 21* 23 21*   BUN 10 9 13   CREATININE 0.7 0.7 0.9   GLUCOSE 125* 102* 121*   CALCIUM 9.3 9.0 8.5*     Recent Labs     06/30/24  1700 07/01/24  0428 07/02/24 0427   WBC 14.8* 13.1* 12.2*   RBC 4.49 3.87 3.17*   HGB 13.5 11.9 9.7*   HCT 40.3 35.1 29.8*   MCV 89.8 90.7 94.0   MCH 30.1 30.7 30.6   MCHC 33.5 33.9 32.6   RDW 12.1 12.3 12.9    339 298   MPV 8.9 9.4 9.4           Principal Problem:    Perforated viscus  Active Problems:    Primary hypertension    Mixed hyperlipidemia    Panlobular emphysema (HCC)    Pneumoperitoneum  Resolved Problems:    * No

## 2024-07-02 NOTE — PROGRESS NOTES
Surgery Progress Note            Chief complaint:   Chief Complaint   Patient presents with    Dysuria     With urinary urgency, reports she has been seen at  for UTI and given an antibiotic, symptoms not improving       Patient Active Problem List   Diagnosis    Primary hypertension    Mixed hyperlipidemia    Vitamin D deficiency    Panlobular emphysema (HCC)    Perforated viscus    Pneumoperitoneum       S: doing well, no n/v    O:   Vitals:    07/02/24 0507   BP: (!) 108/59   Pulse: 97   Resp: 19   Temp: 96.8 °F (36 °C)   SpO2: 90%       Intake/Output Summary (Last 24 hours) at 7/2/2024 0741  Last data filed at 7/2/2024 0718  Gross per 24 hour   Intake 3044.91 ml   Output 750 ml   Net 2294.91 ml           Labs:  Lab Results   Component Value Date/Time    WBC 12.2 07/02/2024 04:27 AM    WBC 13.1 07/01/2024 04:28 AM    WBC 14.8 06/30/2024 05:00 PM    HGB 9.7 07/02/2024 04:27 AM    HGB 11.9 07/01/2024 04:28 AM    HGB 13.5 06/30/2024 05:00 PM    HCT 29.8 07/02/2024 04:27 AM    HCT 35.1 07/01/2024 04:28 AM    HCT 40.3 06/30/2024 05:00 PM     Lab Results   Component Value Date    CREATININE 0.9 07/02/2024    BUN 13 07/02/2024     07/02/2024    K 3.9 07/02/2024     07/02/2024    CO2 21 (L) 07/02/2024     No results found for: \"LIPASE\", \"AMYLASE\"      Physical exam:   BP (!) 108/59   Pulse 97   Temp 96.8 °F (36 °C) (Temporal)   Resp 19   Ht 1.676 m (5' 6\")   Wt 49.9 kg (110 lb)   SpO2 90%   BMI 17.75 kg/m²   General appearance: NAD  Head: NCAT  Neck: supple, no masses  Lungs: equal chest rise bilateral  Heart: S1S2 present  Abdomen: soft, moderately tender, minimally distended,  Incisions c/d/bandaged. Ostomy pink patent and no output  Skin; no lesions  Gu: no cva tenderness  Extremities: extremities normal, atraumatic, no cyanosis or edema    A:  POD # 1 Exploratory laparotomy, sigmoid colon resection with end descending colostomy, takedown of colovesical fistula with repair of bladder, mobilization

## 2024-07-02 NOTE — CARE COORDINATION
7-2-Cm note: met with pt for dc planning, she is S/P new Colostomy , pt denies any needs for home, including home health care , she is waiting for the ostomy nurse to see her , pt feels she can care for the ostomy on her own. She will need supplies and name of vendor to order future supplies.pt's S/O  Matt will transport home. Cm following. Electronically signed by Jeanne Gandara RN on 7/2/2024 at 11:31 AM

## 2024-07-02 NOTE — PLAN OF CARE
Problem: Discharge Planning  Goal: Discharge to home or other facility with appropriate resources  7/2/2024 1056 by Mago Alex, RN  Outcome: Progressing     Problem: ABCDS Injury Assessment  Goal: Absence of physical injury  7/2/2024 1056 by Mago Alex, RN  Outcome: Progressing     Problem: Pain  Goal: Verbalizes/displays adequate comfort level or baseline comfort level  7/2/2024 1056 by Mago Alex, RN  Outcome: Progressing

## 2024-07-03 LAB
ERYTHROCYTE [DISTWIDTH] IN BLOOD BY AUTOMATED COUNT: 13 % (ref 11.5–15)
HCT VFR BLD AUTO: 26.6 % (ref 34–48)
HGB BLD-MCNC: 8.6 G/DL (ref 11.5–15.5)
MCH RBC QN AUTO: 30 PG (ref 26–35)
MCHC RBC AUTO-ENTMCNC: 32.3 G/DL (ref 32–34.5)
MCV RBC AUTO: 92.7 FL (ref 80–99.9)
NON-GYN CYTOLOGY REPORT: NORMAL
PLATELET # BLD AUTO: 300 K/UL (ref 130–450)
PMV BLD AUTO: 9.3 FL (ref 7–12)
RBC # BLD AUTO: 2.87 M/UL (ref 3.5–5.5)
WBC OTHER # BLD: 11.7 K/UL (ref 4.5–11.5)

## 2024-07-03 PROCEDURE — 36415 COLL VENOUS BLD VENIPUNCTURE: CPT

## 2024-07-03 PROCEDURE — 97161 PT EVAL LOW COMPLEX 20 MIN: CPT | Performed by: PHYSICAL THERAPIST

## 2024-07-03 PROCEDURE — 85027 COMPLETE CBC AUTOMATED: CPT

## 2024-07-03 PROCEDURE — 1200000000 HC SEMI PRIVATE

## 2024-07-03 PROCEDURE — 6360000002 HC RX W HCPCS: Performed by: SURGERY

## 2024-07-03 PROCEDURE — 97530 THERAPEUTIC ACTIVITIES: CPT | Performed by: PHYSICAL THERAPIST

## 2024-07-03 PROCEDURE — 6370000000 HC RX 637 (ALT 250 FOR IP): Performed by: SURGERY

## 2024-07-03 PROCEDURE — 99233 SBSQ HOSP IP/OBS HIGH 50: CPT | Performed by: INTERNAL MEDICINE

## 2024-07-03 PROCEDURE — 2580000003 HC RX 258: Performed by: SURGERY

## 2024-07-03 PROCEDURE — 99024 POSTOP FOLLOW-UP VISIT: CPT | Performed by: SURGERY

## 2024-07-03 RX ADMIN — METRONIDAZOLE 500 MG: 500 INJECTION, SOLUTION INTRAVENOUS at 12:16

## 2024-07-03 RX ADMIN — METHOCARBAMOL 500 MG: 500 TABLET ORAL at 16:57

## 2024-07-03 RX ADMIN — MORPHINE SULFATE 4 MG: 4 INJECTION, SOLUTION INTRAMUSCULAR; INTRAVENOUS at 05:42

## 2024-07-03 RX ADMIN — METRONIDAZOLE 500 MG: 500 INJECTION, SOLUTION INTRAVENOUS at 19:48

## 2024-07-03 RX ADMIN — WATER 1000 MG: 1 INJECTION INTRAMUSCULAR; INTRAVENOUS; SUBCUTANEOUS at 09:43

## 2024-07-03 RX ADMIN — ENOXAPARIN SODIUM 40 MG: 100 INJECTION SUBCUTANEOUS at 09:44

## 2024-07-03 RX ADMIN — METRONIDAZOLE 500 MG: 500 INJECTION, SOLUTION INTRAVENOUS at 05:35

## 2024-07-03 RX ADMIN — MORPHINE SULFATE 4 MG: 4 INJECTION, SOLUTION INTRAMUSCULAR; INTRAVENOUS at 23:46

## 2024-07-03 RX ADMIN — TRAMADOL HYDROCHLORIDE 50 MG: 50 TABLET ORAL at 09:53

## 2024-07-03 RX ADMIN — MORPHINE SULFATE 2 MG: 2 INJECTION, SOLUTION INTRAMUSCULAR; INTRAVENOUS at 14:24

## 2024-07-03 RX ADMIN — MORPHINE SULFATE 4 MG: 4 INJECTION, SOLUTION INTRAMUSCULAR; INTRAVENOUS at 19:01

## 2024-07-03 RX ADMIN — METHOCARBAMOL 500 MG: 500 TABLET ORAL at 21:15

## 2024-07-03 RX ADMIN — TRAMADOL HYDROCHLORIDE 50 MG: 50 TABLET ORAL at 16:57

## 2024-07-03 RX ADMIN — MORPHINE SULFATE 4 MG: 4 INJECTION, SOLUTION INTRAMUSCULAR; INTRAVENOUS at 00:19

## 2024-07-03 RX ADMIN — METHOCARBAMOL 500 MG: 500 TABLET ORAL at 09:44

## 2024-07-03 ASSESSMENT — PAIN SCALES - GENERAL
PAINLEVEL_OUTOF10: 8
PAINLEVEL_OUTOF10: 3
PAINLEVEL_OUTOF10: 5
PAINLEVEL_OUTOF10: 9
PAINLEVEL_OUTOF10: 8
PAINLEVEL_OUTOF10: 2
PAINLEVEL_OUTOF10: 3
PAINLEVEL_OUTOF10: 4
PAINLEVEL_OUTOF10: 7
PAINLEVEL_OUTOF10: 6
PAINLEVEL_OUTOF10: 7
PAINLEVEL_OUTOF10: 8

## 2024-07-03 ASSESSMENT — PAIN DESCRIPTION - DESCRIPTORS
DESCRIPTORS: ACHING;DULL;JABBING
DESCRIPTORS: PRESSURE;SHARP;TENDER
DESCRIPTORS: DISCOMFORT;SORE
DESCRIPTORS: ACHING;DULL;JABBING

## 2024-07-03 ASSESSMENT — PAIN DESCRIPTION - LOCATION
LOCATION: ABDOMEN

## 2024-07-03 ASSESSMENT — PAIN DESCRIPTION - ONSET: ONSET: ON-GOING

## 2024-07-03 ASSESSMENT — PAIN DESCRIPTION - FREQUENCY: FREQUENCY: CONTINUOUS

## 2024-07-03 ASSESSMENT — PAIN DESCRIPTION - ORIENTATION: ORIENTATION: RIGHT;LEFT;MID

## 2024-07-03 ASSESSMENT — PAIN - FUNCTIONAL ASSESSMENT: PAIN_FUNCTIONAL_ASSESSMENT: PREVENTS OR INTERFERES SOME ACTIVE ACTIVITIES AND ADLS

## 2024-07-03 ASSESSMENT — PAIN DESCRIPTION - PAIN TYPE: TYPE: SURGICAL PAIN

## 2024-07-03 NOTE — PROGRESS NOTES
Progress  Note  Chief Complaint   Patient presents with    Dysuria     With urinary urgency, reports she has been seen at  for UTI and given an antibiotic, symptoms not improving      Historical Issues:  Current Facility-Administered Medications   Medication Dose Route Frequency Provider Last Rate Last Admin    traMADol (ULTRAM) tablet 25 mg  25 mg Oral Q6H PRN Abdi Day MD        Or    traMADol (ULTRAM) tablet 50 mg  50 mg Oral Q6H PRN Abdi Day MD   50 mg at 07/03/24 0953    methocarbamol (ROBAXIN) tablet 500 mg  500 mg Oral 4x Daily Abdi Day MD   500 mg at 07/03/24 0944    sodium chloride flush 0.9 % injection 5-40 mL  5-40 mL IntraVENous 2 times per day Abdi Day MD   10 mL at 07/01/24 2143    sodium chloride flush 0.9 % injection 5-40 mL  5-40 mL IntraVENous PRN Abdi Day MD        0.9 % sodium chloride infusion   IntraVENous PRN Abdi Day MD        potassium chloride (KLOR-CON M) extended release tablet 40 mEq  40 mEq Oral PRN Abdi Day MD        Or    potassium bicarb-citric acid (EFFER-K) effervescent tablet 40 mEq  40 mEq Oral PRN Abdi Day MD        Or    potassium chloride 10 mEq/100 mL IVPB (Peripheral Line)  10 mEq IntraVENous PRN Abdi Day MD        magnesium sulfate 2000 mg in 50 mL IVPB premix  2,000 mg IntraVENous PRN Abdi Day MD        ondansetron (ZOFRAN-ODT) disintegrating tablet 4 mg  4 mg Oral Q8H PRN Abdi Day MD        Or    ondansetron (ZOFRAN) injection 4 mg  4 mg IntraVENous Q6H PRN Abdi Day MD        enoxaparin (LOVENOX) injection 40 mg  40 mg SubCUTAneous Daily Abdi Day MD   40 mg at 07/03/24 0944    lactated ringers IV soln infusion   IntraVENous Continuous Abdi Day MD 50 mL/hr at 07/03/24 0944 Rate Change at 07/03/24 0944    cefTRIAXone (ROCEPHIN) 1,000 mg in sterile water 10 mL IV syringe  1,000 mg IntraVENous Daily Abdi Day MD   1,000 mg at 07/03/24 0943

## 2024-07-03 NOTE — PROGRESS NOTES
Orthostatic vital signs completed per order.    Lying /75   Sitting /101   Standing /89     Patient reported slight dizziness when standing but no increased pain or shortness of breath.

## 2024-07-03 NOTE — PROGRESS NOTES
Physical Therapy  Physical Therapy Initial Evaluation/Plan of Care    Room #:  0315/0315-02  Patient Name: Cherrie Weems  YOB: 1960  MRN: [id number]    Date of Service: 7/3/2024     Tentative placement recommendation: Home with Home Health Physical Therapy   Equipment recommendation: To be determined      Evaluating Physical Therapist: Yaron Ambriz PT, DPT #[id number]      Specific Provider Orders/Date/Referring Provider :     07/03/24 0715    PT eval and treat  Start:  07/03/24 0715,   End:  07/03/24 0715,   ONE TIME,   Standing Count:  1 Occurrences,   R       Shay, Abdi DANIELS MD Acknowledge New    Admitting Diagnosis:   Pneumoperitoneum [K66.8]  Diverticulitis of colon [K57.32]  Perforated viscus [R19.8]  Colonic mass [K63.89]      Surgery: Sigmoid Colon Resection with Colostomy on 7/1/24  Visit Diagnoses         Codes    Colonic mass     K63.89    Diverticulitis of colon     K57.32    Diverticulitis     K57.92            Patient Active Problem List   Diagnosis    Primary hypertension    Mixed hyperlipidemia    Vitamin D deficiency    Panlobular emphysema (HCC)    Perforated viscus    Pneumoperitoneum        ASSESSMENT of Current Deficits Patient exhibits decreased strength, balance, and endurance impairing functional mobility, transfers, gait , gait distance, and tolerance to activity. Pt tentative with mobility largely limited by abdominal pain and mild dizziness/lightheadedness. Pt required ModA with HHA for mobility with no LOB nor SOB.        PHYSICAL THERAPY  PLAN OF CARE       Physical therapy plan of care is established based on physician order,  patient diagnosis and clinical assessment    Current Treatment Recommendations:    -Bed Mobility: Lower and upper extremity exercises, and trunk control activities  -Sitting Balance: Incorporate reaching activities to activate trunk muscles , Hands on support to maintain midline , Facilitate active trunk muscle engagement , Facilitate postural control  INTESTINE SURGERY N/A 7/1/2024    EXPLORATORY LAPAROTOMY SIGMOID COLON RESECTION POSS COLOSTOMY performed by Abdi Day MD at [facility] OR       SUBJECTIVE:    Precautions: Up with assistance, falls and abdominal precautions      Social history: Patient lives with boyfriend in a ranch home  with 4 steps, bilateral rail or ramp to enter home  Walk in shower  , grab bars, built in shower chair      Equipment owned: Shower chair and grab bars    AM-PAC Basic Mobility       AM-PAC Basic Mobility - Inpatient   How much help is needed turning from your back to your side while in a flat bed without using bedrails?: A Little  How much help is needed moving from lying on your back to sitting on the side of a flat bed without using bedrails?: A Little  How much help is needed moving to and from a bed to a chair?: A Little  How much help is needed standing up from a chair using your arms?: A Little  How much help is needed walking in hospital room?: A Lot  How much help is needed climbing 3-5 steps with a railing?: A Lot  AM-PAC Inpatient Mobility Raw Score : 16  AM-PAC Inpatient T-Scale Score : 40.78  Mobility Inpatient CMS 0-100% Score: 54.16  Mobility Inpatient CMS G-Code Modifier : CK    Nursing cleared patient for PT evaluation. The admitting diagnosis and active problem list as listed above have been reviewed prior to the initiation of this evaluation.    OBJECTIVE:   Initial Evaluation  Date: 7/3/2024 Treatment Date:     Short Term/ Long Term   Goals   Was pt agreeable to Eval/treatment? Yes  To be met in 2 days   Pain level   6/10  Abdomen     Bed Mobility  Using rails and head of bed elevated:     Rolling: Minimal assist of 1    Supine to sit: Minimal assist of 1    Sit to supine: Minimal assist of 1    Scooting: Minimal assist of 1    Rolling: Independent    Supine to sit: Independent    Sit to supine: Independent    Scooting: Independent     Transfers Sit to stand: Minimal assist of 1   Sit to stand: Independent

## 2024-07-03 NOTE — PROGRESS NOTES
Surgery Progress Note            Chief complaint:   Chief Complaint   Patient presents with    Dysuria     With urinary urgency, reports she has been seen at  for UTI and given an antibiotic, symptoms not improving       Patient Active Problem List   Diagnosis    Primary hypertension    Mixed hyperlipidemia    Vitamin D deficiency    Panlobular emphysema (HCC)    Perforated viscus    Pneumoperitoneum       S: doing well, no n/v    O:   Vitals:    07/03/24 0612   BP:    Pulse:    Resp: 16   Temp:    SpO2:        Intake/Output Summary (Last 24 hours) at 7/3/2024 0703  Last data filed at 7/3/2024 0548  Gross per 24 hour   Intake 4508.7 ml   Output 950 ml   Net 3558.7 ml             Labs:  Lab Results   Component Value Date/Time    WBC 12.2 07/02/2024 04:27 AM    WBC 13.1 07/01/2024 04:28 AM    WBC 14.8 06/30/2024 05:00 PM    HGB 9.7 07/02/2024 04:27 AM    HGB 11.9 07/01/2024 04:28 AM    HGB 13.5 06/30/2024 05:00 PM    HCT 29.8 07/02/2024 04:27 AM    HCT 35.1 07/01/2024 04:28 AM    HCT 40.3 06/30/2024 05:00 PM     Lab Results   Component Value Date    CREATININE 0.9 07/02/2024    BUN 13 07/02/2024     07/02/2024    K 3.9 07/02/2024     07/02/2024    CO2 21 (L) 07/02/2024     No results found for: \"LIPASE\", \"AMYLASE\"      Physical exam:   /72   Pulse 86   Temp 98.4 °F (36.9 °C) (Oral)   Resp 16   Ht 1.676 m (5' 6\")   Wt 49.9 kg (110 lb)   SpO2 96%   BMI 17.75 kg/m²   General appearance: NAD  Head: NCAT  Neck: supple, no masses  Lungs: equal chest rise bilateral  Heart: S1S2 present  Abdomen: soft, moderately tender, minimally distended,  Incisions c/d/bandaged. Ostomy pink patent and no output  Skin; no lesions  Gu: no cva tenderness  Extremities: extremities normal, atraumatic, no cyanosis or edema    A:  POD # 2 Exploratory laparotomy, sigmoid colon resection with end descending colostomy, takedown of colovesical fistula with repair of bladder, mobilization of splenic flexure, mobilization

## 2024-07-03 NOTE — PLAN OF CARE
Problem: Discharge Planning  Goal: Discharge to home or other facility with appropriate resources  7/2/2024 2309 by Elías Day, RN  Outcome: Progressing     Problem: ABCDS Injury Assessment  Goal: Absence of physical injury  7/2/2024 2309 by Elías Day, RN  Outcome: Progressing     Problem: Pain  Goal: Verbalizes/displays adequate comfort level or baseline comfort level  7/2/2024 2309 by Elías Day, RN  Outcome: Progressing     Problem: Safety - Adult  Goal: Free from fall injury  Outcome: Progressing

## 2024-07-03 NOTE — PROGRESS NOTES
ET Nurse  Admit Date: 6/30/2024  4:41 PM    Reason for consult:  new colostomy    Significant history:  History reviewed. No pertinent past medical history.    Stoma assessment:  stoma good red color no stool      Plan:    Pt states she does not feel good    Began lesson in care  Supplies at bedside        Chiara Ivy RN 7/3/2024 5:06 PM

## 2024-07-03 NOTE — PLAN OF CARE
Problem: Discharge Planning  Goal: Discharge to home or other facility with appropriate resources  7/3/2024 1257 by Inessa Sheehan RN  Outcome: Progressing     Problem: ABCDS Injury Assessment  Goal: Absence of physical injury  7/3/2024 1257 by Inessa Sheehan RN  Outcome: Progressing     Problem: Pain  Goal: Verbalizes/displays adequate comfort level or baseline comfort level  7/3/2024 1257 by Inessa Sheehan RN  Outcome: Progressing     Problem: Safety - Adult  Goal: Free from fall injury  7/3/2024 1257 by Inessa Sheehan RN  Outcome: Progressing

## 2024-07-04 LAB
ERYTHROCYTE [DISTWIDTH] IN BLOOD BY AUTOMATED COUNT: 13 % (ref 11.5–15)
HCT VFR BLD AUTO: 27.5 % (ref 34–48)
HGB BLD-MCNC: 8.9 G/DL (ref 11.5–15.5)
MCH RBC QN AUTO: 30.7 PG (ref 26–35)
MCHC RBC AUTO-ENTMCNC: 32.4 G/DL (ref 32–34.5)
MCV RBC AUTO: 94.8 FL (ref 80–99.9)
PLATELET # BLD AUTO: 323 K/UL (ref 130–450)
PMV BLD AUTO: 9.1 FL (ref 7–12)
RBC # BLD AUTO: 2.9 M/UL (ref 3.5–5.5)
WBC OTHER # BLD: 11.5 K/UL (ref 4.5–11.5)

## 2024-07-04 PROCEDURE — 6360000002 HC RX W HCPCS: Performed by: SURGERY

## 2024-07-04 PROCEDURE — 6370000000 HC RX 637 (ALT 250 FOR IP): Performed by: INTERNAL MEDICINE

## 2024-07-04 PROCEDURE — 6370000000 HC RX 637 (ALT 250 FOR IP): Performed by: SURGERY

## 2024-07-04 PROCEDURE — 2580000003 HC RX 258: Performed by: SURGERY

## 2024-07-04 PROCEDURE — 36415 COLL VENOUS BLD VENIPUNCTURE: CPT

## 2024-07-04 PROCEDURE — 97535 SELF CARE MNGMENT TRAINING: CPT | Performed by: OCCUPATIONAL THERAPIST

## 2024-07-04 PROCEDURE — 99233 SBSQ HOSP IP/OBS HIGH 50: CPT | Performed by: INTERNAL MEDICINE

## 2024-07-04 PROCEDURE — 85027 COMPLETE CBC AUTOMATED: CPT

## 2024-07-04 PROCEDURE — 97530 THERAPEUTIC ACTIVITIES: CPT | Performed by: OCCUPATIONAL THERAPIST

## 2024-07-04 PROCEDURE — 97165 OT EVAL LOW COMPLEX 30 MIN: CPT | Performed by: OCCUPATIONAL THERAPIST

## 2024-07-04 PROCEDURE — 1200000000 HC SEMI PRIVATE

## 2024-07-04 RX ORDER — METOPROLOL SUCCINATE 50 MG/1
50 TABLET, EXTENDED RELEASE ORAL DAILY
Status: DISCONTINUED | OUTPATIENT
Start: 2024-07-04 | End: 2024-07-13 | Stop reason: HOSPADM

## 2024-07-04 RX ADMIN — SODIUM CHLORIDE, PRESERVATIVE FREE 10 ML: 5 INJECTION INTRAVENOUS at 19:56

## 2024-07-04 RX ADMIN — MORPHINE SULFATE 4 MG: 4 INJECTION, SOLUTION INTRAMUSCULAR; INTRAVENOUS at 11:44

## 2024-07-04 RX ADMIN — ONDANSETRON 4 MG: 2 INJECTION INTRAMUSCULAR; INTRAVENOUS at 15:47

## 2024-07-04 RX ADMIN — METOPROLOL SUCCINATE 50 MG: 50 TABLET, EXTENDED RELEASE ORAL at 11:39

## 2024-07-04 RX ADMIN — WATER 1000 MG: 1 INJECTION INTRAMUSCULAR; INTRAVENOUS; SUBCUTANEOUS at 08:29

## 2024-07-04 RX ADMIN — TRAMADOL HYDROCHLORIDE 50 MG: 50 TABLET ORAL at 13:21

## 2024-07-04 RX ADMIN — METRONIDAZOLE 500 MG: 500 INJECTION, SOLUTION INTRAVENOUS at 11:40

## 2024-07-04 RX ADMIN — METHOCARBAMOL 500 MG: 500 TABLET ORAL at 08:29

## 2024-07-04 RX ADMIN — METRONIDAZOLE 500 MG: 500 INJECTION, SOLUTION INTRAVENOUS at 19:49

## 2024-07-04 RX ADMIN — METHOCARBAMOL 500 MG: 500 TABLET ORAL at 17:15

## 2024-07-04 RX ADMIN — METHOCARBAMOL 500 MG: 500 TABLET ORAL at 19:45

## 2024-07-04 RX ADMIN — MORPHINE SULFATE 2 MG: 2 INJECTION, SOLUTION INTRAMUSCULAR; INTRAVENOUS at 19:45

## 2024-07-04 RX ADMIN — TRAMADOL HYDROCHLORIDE 50 MG: 50 TABLET ORAL at 23:16

## 2024-07-04 RX ADMIN — ENOXAPARIN SODIUM 40 MG: 100 INJECTION SUBCUTANEOUS at 08:29

## 2024-07-04 RX ADMIN — METHOCARBAMOL 500 MG: 500 TABLET ORAL at 13:51

## 2024-07-04 RX ADMIN — MORPHINE SULFATE 2 MG: 2 INJECTION, SOLUTION INTRAMUSCULAR; INTRAVENOUS at 15:47

## 2024-07-04 RX ADMIN — MORPHINE SULFATE 4 MG: 4 INJECTION, SOLUTION INTRAMUSCULAR; INTRAVENOUS at 03:33

## 2024-07-04 RX ADMIN — METRONIDAZOLE 500 MG: 500 INJECTION, SOLUTION INTRAVENOUS at 04:57

## 2024-07-04 RX ADMIN — TRAMADOL HYDROCHLORIDE 50 MG: 50 TABLET ORAL at 06:29

## 2024-07-04 ASSESSMENT — PAIN SCALES - GENERAL
PAINLEVEL_OUTOF10: 3
PAINLEVEL_OUTOF10: 7
PAINLEVEL_OUTOF10: 7
PAINLEVEL_OUTOF10: 5
PAINLEVEL_OUTOF10: 0
PAINLEVEL_OUTOF10: 7
PAINLEVEL_OUTOF10: 7
PAINLEVEL_OUTOF10: 5
PAINLEVEL_OUTOF10: 3
PAINLEVEL_OUTOF10: 8
PAINLEVEL_OUTOF10: 2
PAINLEVEL_OUTOF10: 3

## 2024-07-04 ASSESSMENT — PAIN DESCRIPTION - LOCATION
LOCATION: ABDOMEN

## 2024-07-04 ASSESSMENT — PAIN DESCRIPTION - DESCRIPTORS
DESCRIPTORS: TENDER;SORE;DISCOMFORT
DESCRIPTORS: ACHING
DESCRIPTORS: SPASM
DESCRIPTORS: ACHING;SORE
DESCRIPTORS: ACHING;DULL;JABBING
DESCRIPTORS: TENDER;SORE;DISCOMFORT

## 2024-07-04 ASSESSMENT — PAIN - FUNCTIONAL ASSESSMENT
PAIN_FUNCTIONAL_ASSESSMENT: PREVENTS OR INTERFERES SOME ACTIVE ACTIVITIES AND ADLS
PAIN_FUNCTIONAL_ASSESSMENT: ACTIVITIES ARE NOT PREVENTED

## 2024-07-04 ASSESSMENT — PAIN DESCRIPTION - ORIENTATION
ORIENTATION: MID

## 2024-07-04 NOTE — PROGRESS NOTES
OCCUPATIONAL THERAPY INITIAL EVALUATION    [facility]  [address]         Date:2024                                                   Patient Name: Cherrie Weems     MRN: [id number]     : 1960     Room: [address] [place] [address]       Evaluating OT: Kelsey Marin, OTR/L; [id number]       Referring Provider and Orders/Date:    OT eval and treat  Start:  24, End:  24,   ONE TIME,   Standing Count:  1 Occurrences,   R       Abdi Day MD    Recommended placement: home with home health       Diagnosis:   1. Pneumoperitoneum    2. Colonic mass    3. Diverticulitis of colon    4. Diverticulitis         Surgery: 24:Exploratory laparotomy, sigmoid colon resection with end descending colostomy, takedown of colovesical fistula with repair of bladder, mobilization of splenic flexure, mobilization and placement of omental flap and open drainage of intraabdominal abscess.       Pertinent Medical History:      History reviewed. No pertinent past medical history.       Past Surgical History:   Procedure Laterality Date    SMALL INTESTINE SURGERY N/A 2024    EXPLORATORY LAPAROTOMY SIGMOID COLON RESECTION POSS COLOSTOMY performed by Abdi Day MD at [facility] OR       Precautions:  Fall Risk, abdominal precautions,TAMY drain, ostomy/guevara        Assessment of current deficits     [x] Functional mobility  [x]ADLs  [x] Strength               []Cognition     [x] Functional transfers   [x] IADLs         [x] Safety Awareness   [x]Endurance     [] Fine Coordination              [x] Balance      [] Vision/perception   []Sensation      [x]Gross Motor Coordination  [] ROM  [] Delirium                   [] Motor Control     OT PLAN OF CARE   OT POC based on physician orders, patient diagnosis and results of clinical assessment    Frequency/Duration 1-3 days/wk for 2 weeks PRN   Specific OT Treatment Interventions to include:   *  Additional Info:  Goal:   RUE  WFL 4/5 good  and  FMC/dexterity noted during ADL tasks  Opposition [x] Intact [] Impaired  Finger to nose [x] Intact [] Impaired 5/5MMT generally for carry over into self care, functional transfers and functional mobility with AD.    LUE WFL 4/5 good  and  FMC/dexterity noted during ADL tasks  Opposition [x] Intact [] Impaired  Finger to nose [x] Intact [] Impaired 5/5MMT generally for carry over into self care, functional transfers and functional mobility with AD.      Hearing: WFL   Sensation:  No c/o numbness or tingling   Tone: WFL   Edema: none    Comments: Upon arrival patient supine. The biggest barriers reflect that of functional transfers, functional mobility, UB/LB ADLs, pain, activity tolerance, balance, safety and strengthening. At end of session, patient sitting EOB with call light and phone within reach, all lines and tubes intact.  Overall patient demonstrated decreased independence and safety during completion of ADL/functional transfer/mobility tasks compared to PLOF. Nursing updated on pt position and status following OT eval. Pt would benefit from continued skilled OT to increase safety and independence with completion of ADL/IADL tasks for functional independence and quality of life.    Treatment: OT treatment provided this date includes:  Instruction, education and training on safe facilitation and adapted techniques for completion of ADLs. These include neuromuscular reeducation to facilitate balance/righting reactions,safe functional transfer techniques, proper positioning/alignment to improve interaction with environment and overall function and on adapted techniques/work simplification for completion of ADLs. Education provided on hand/feet placement with bed rail, walker and body mechanics for fall prevention. Cues for energy conservation and safety for DC, including modifications and DME. Extended time to complete all tasks, including skilled

## 2024-07-04 NOTE — PROGRESS NOTES
General Surgery Progress Note  [facility]    Patient's Name/Date of Birth: Cherrie Weems / 1960    Date: July 4, 2024     Surgeon: MD Johnie    Chief Complaint: Perforated viscus    Patient Active Problem List   Diagnosis    Primary hypertension    Mixed hyperlipidemia    Vitamin D deficiency    Panlobular emphysema (HCC)    Perforated viscus    Pneumoperitoneum       Subjective: Patient is overall doing well.  Feels better today than yesterday.  Has had some air in the bag however no significant output.  Pain is well-controlled except when moving around    Objective:  BP (!) 155/80   Pulse 93   Temp 97.6 °F (36.4 °C) (Oral)   Resp 16   Ht 1.676 m (5' 6\")   Wt 49.9 kg (110 lb)   SpO2 93%   BMI 17.75 kg/m²   Labs:  Recent Labs     07/02/24  0427 07/03/24  1237 07/04/24  0329   WBC 12.2* 11.7* 11.5   HGB 9.7* 8.6* 8.9*   HCT 29.8* 26.6* 27.5*     Lab Results   Component Value Date    CREATININE 0.9 07/02/2024    BUN 13 07/02/2024     07/02/2024    K 3.9 07/02/2024     07/02/2024    CO2 21 (L) 07/02/2024     No results for input(s): \"LIPASE\", \"AMYLASE\" in the last 72 hours.  CBC with Differential:    Lab Results   Component Value Date/Time    WBC 11.5 07/04/2024 03:29 AM    RBC 2.90 07/04/2024 03:29 AM    HGB 8.9 07/04/2024 03:29 AM    HCT 27.5 07/04/2024 03:29 AM     07/04/2024 03:29 AM    MCV 94.8 07/04/2024 03:29 AM    MCH 30.7 07/04/2024 03:29 AM    MCHC 32.4 07/04/2024 03:29 AM    RDW 13.0 07/04/2024 03:29 AM    LYMPHOPCT 5 07/02/2024 04:27 AM    MONOPCT 1 07/02/2024 04:27 AM    EOSPCT 0 07/02/2024 04:27 AM    BASOPCT 0 07/02/2024 04:27 AM    MONOSABS 0.10 07/02/2024 04:27 AM    LYMPHSABS 0.62 07/02/2024 04:27 AM    EOSABS 0.00 07/02/2024 04:27 AM    BASOSABS 0.00 07/02/2024 04:27 AM     CMP:    Lab Results   Component Value Date/Time     07/02/2024 04:27 AM    K 3.9 07/02/2024 04:27 AM     07/02/2024 04:27 AM    CO2 21 07/02/2024 04:27 AM    BUN 13

## 2024-07-04 NOTE — PROGRESS NOTES
Progress  Note  Chief Complaint   Patient presents with    Dysuria     With urinary urgency, reports she has been seen at  for UTI and given an antibiotic, symptoms not improving      Historical Issues:  Current Facility-Administered Medications   Medication Dose Route Frequency Provider Last Rate Last Admin    traMADol (ULTRAM) tablet 25 mg  25 mg Oral Q6H PRN Abdi Day MD        Or    traMADol (ULTRAM) tablet 50 mg  50 mg Oral Q6H PRN Abdi Day MD   50 mg at 07/04/24 0629    methocarbamol (ROBAXIN) tablet 500 mg  500 mg Oral 4x Daily Abdi Day MD   500 mg at 07/04/24 0829    sodium chloride flush 0.9 % injection 5-40 mL  5-40 mL IntraVENous 2 times per day Abdi Day MD   10 mL at 07/01/24 2143    sodium chloride flush 0.9 % injection 5-40 mL  5-40 mL IntraVENous PRN Abdi Day MD        0.9 % sodium chloride infusion   IntraVENous PRN Abdi Day MD        potassium chloride (KLOR-CON M) extended release tablet 40 mEq  40 mEq Oral PRN Abdi Day MD        Or    potassium bicarb-citric acid (EFFER-K) effervescent tablet 40 mEq  40 mEq Oral PRN Abdi Day MD        Or    potassium chloride 10 mEq/100 mL IVPB (Peripheral Line)  10 mEq IntraVENous PRN Abdi Day MD        magnesium sulfate 2000 mg in 50 mL IVPB premix  2,000 mg IntraVENous PRN Abdi Day MD        ondansetron (ZOFRAN-ODT) disintegrating tablet 4 mg  4 mg Oral Q8H PRN Abdi Day MD        Or    ondansetron (ZOFRAN) injection 4 mg  4 mg IntraVENous Q6H PRN Abdi Day MD        enoxaparin (LOVENOX) injection 40 mg  40 mg SubCUTAneous Daily Abdi Day MD   40 mg at 07/04/24 0829    lactated ringers IV soln infusion   IntraVENous Continuous Abdi Day MD 50 mL/hr at 07/04/24 0619 Rate Verify at 07/04/24 0619    cefTRIAXone (ROCEPHIN) 1,000 mg in sterile water 10 mL IV syringe  1,000 mg IntraVENous Daily Abdi Day MD   1,000 mg at 07/04/24 0829     metroNIDAZOLE (FLAGYL) 500 mg in 0.9% NaCl 100 mL IVPB premix  500 mg IntraVENous Q8H Abdi Day MD   Stopped at 07/04/24 0557    morphine (PF) injection 2 mg  2 mg IntraVENous Q2H PRN Abdi Day MD   2 mg at 07/03/24 1424    Or    morphine injection 4 mg  4 mg IntraVENous Q2H PRN Abdi Day MD   4 mg at 07/04/24 0333    ipratropium 0.5 mg-albuterol 2.5 mg (DUONEB) nebulizer solution 1 Dose  1 Dose Inhalation Q4H PRN Abdi Day MD        diphenhydrAMINE (BENADRYL) injection 25 mg  25 mg IntraVENous Q6H PRN Abdi Day MD        nicotine (NICODERM CQ) 21 MG/24HR 1 patch  1 patch TransDERmal Daily Abdi Day MD   1 patch at 07/03/24 2113     Recent Complaints:  Review of Systems   Respiratory: Negative.     Cardiovascular: Negative.    Gastrointestinal:  Positive for abdominal pain and nausea. Negative for diarrhea and vomiting.   Neurological:  Positive for weakness and light-headedness.     Vitals:    07/04/24 0800   BP:    Pulse:    Resp:    Temp:    SpO2: 93%     Physical Exam  Cardiovascular:      Rate and Rhythm: Normal rate and regular rhythm.      Heart sounds: No murmur heard.  Pulmonary:      Effort: Pulmonary effort is normal.      Breath sounds: Normal breath sounds.   Musculoskeletal:      Right lower leg: No edema.      Left lower leg: No edema.         Recent Labs     07/02/24  0427      K 3.9      CO2 21*   BUN 13   CREATININE 0.9   GLUCOSE 121*   CALCIUM 8.5*     Recent Labs     07/02/24  0427 07/03/24  1237 07/04/24  0329   WBC 12.2* 11.7* 11.5   RBC 3.17* 2.87* 2.90*   HGB 9.7* 8.6* 8.9*   HCT 29.8* 26.6* 27.5*   MCV 94.0 92.7 94.8   MCH 30.6 30.0 30.7   MCHC 32.6 32.3 32.4   RDW 12.9 13.0 13.0    300 323   MPV 9.4 9.3 9.1           Principal Problem:    Perforated viscus  Active Problems:    Primary hypertension    Mixed hyperlipidemia    Panlobular emphysema (HCC)    Pneumoperitoneum  Resolved Problems:    * No resolved hospital

## 2024-07-05 LAB
ERYTHROCYTE [DISTWIDTH] IN BLOOD BY AUTOMATED COUNT: 13 % (ref 11.5–15)
HCT VFR BLD AUTO: 31.1 % (ref 34–48)
HGB BLD-MCNC: 10.4 G/DL (ref 11.5–15.5)
MCH RBC QN AUTO: 31 PG (ref 26–35)
MCHC RBC AUTO-ENTMCNC: 33.4 G/DL (ref 32–34.5)
MCV RBC AUTO: 92.8 FL (ref 80–99.9)
PLATELET # BLD AUTO: 397 K/UL (ref 130–450)
PMV BLD AUTO: 9 FL (ref 7–12)
RBC # BLD AUTO: 3.35 M/UL (ref 3.5–5.5)
WBC OTHER # BLD: 13.6 K/UL (ref 4.5–11.5)

## 2024-07-05 PROCEDURE — 2580000003 HC RX 258: Performed by: INTERNAL MEDICINE

## 2024-07-05 PROCEDURE — 6370000000 HC RX 637 (ALT 250 FOR IP): Performed by: FAMILY MEDICINE

## 2024-07-05 PROCEDURE — 6370000000 HC RX 637 (ALT 250 FOR IP): Performed by: SURGERY

## 2024-07-05 PROCEDURE — 36415 COLL VENOUS BLD VENIPUNCTURE: CPT

## 2024-07-05 PROCEDURE — 99024 POSTOP FOLLOW-UP VISIT: CPT | Performed by: SURGERY

## 2024-07-05 PROCEDURE — 85027 COMPLETE CBC AUTOMATED: CPT

## 2024-07-05 PROCEDURE — 99232 SBSQ HOSP IP/OBS MODERATE 35: CPT | Performed by: INTERNAL MEDICINE

## 2024-07-05 PROCEDURE — 1200000000 HC SEMI PRIVATE

## 2024-07-05 PROCEDURE — 2580000003 HC RX 258: Performed by: SURGERY

## 2024-07-05 PROCEDURE — 97116 GAIT TRAINING THERAPY: CPT

## 2024-07-05 PROCEDURE — 97530 THERAPEUTIC ACTIVITIES: CPT

## 2024-07-05 PROCEDURE — 6360000002 HC RX W HCPCS: Performed by: SURGERY

## 2024-07-05 PROCEDURE — 6370000000 HC RX 637 (ALT 250 FOR IP): Performed by: INTERNAL MEDICINE

## 2024-07-05 RX ORDER — ACETAMINOPHEN 325 MG/1
650 TABLET ORAL EVERY 4 HOURS PRN
Status: DISCONTINUED | OUTPATIENT
Start: 2024-07-05 | End: 2024-07-13 | Stop reason: HOSPADM

## 2024-07-05 RX ORDER — TRAMADOL HYDROCHLORIDE 50 MG/1
50 TABLET ORAL EVERY 4 HOURS PRN
Status: DISCONTINUED | OUTPATIENT
Start: 2024-07-05 | End: 2024-07-13 | Stop reason: HOSPADM

## 2024-07-05 RX ORDER — CALCIUM CARBONATE 500(1250)
500 TABLET ORAL DAILY
Status: DISCONTINUED | OUTPATIENT
Start: 2024-07-05 | End: 2024-07-13 | Stop reason: HOSPADM

## 2024-07-05 RX ORDER — TRAMADOL HYDROCHLORIDE 50 MG/1
25 TABLET ORAL EVERY 4 HOURS PRN
Status: DISCONTINUED | OUTPATIENT
Start: 2024-07-05 | End: 2024-07-13 | Stop reason: HOSPADM

## 2024-07-05 RX ORDER — ASPIRIN 81 MG/1
81 TABLET ORAL DAILY
Status: DISCONTINUED | OUTPATIENT
Start: 2024-07-05 | End: 2024-07-13 | Stop reason: HOSPADM

## 2024-07-05 RX ORDER — ROSUVASTATIN CALCIUM 10 MG/1
10 TABLET, COATED ORAL NIGHTLY
Status: DISCONTINUED | OUTPATIENT
Start: 2024-07-05 | End: 2024-07-07

## 2024-07-05 RX ORDER — ERGOCALCIFEROL 1.25 MG/1
50000 CAPSULE ORAL WEEKLY
Status: DISCONTINUED | OUTPATIENT
Start: 2024-07-05 | End: 2024-07-13 | Stop reason: HOSPADM

## 2024-07-05 RX ADMIN — SODIUM CHLORIDE, POTASSIUM CHLORIDE, SODIUM LACTATE AND CALCIUM CHLORIDE: 600; 310; 30; 20 INJECTION, SOLUTION INTRAVENOUS at 05:02

## 2024-07-05 RX ADMIN — MORPHINE SULFATE 2 MG: 2 INJECTION, SOLUTION INTRAMUSCULAR; INTRAVENOUS at 05:01

## 2024-07-05 RX ADMIN — ENOXAPARIN SODIUM 40 MG: 100 INJECTION SUBCUTANEOUS at 08:29

## 2024-07-05 RX ADMIN — ERGOCALCIFEROL 50000 UNITS: 1.25 CAPSULE ORAL at 10:23

## 2024-07-05 RX ADMIN — TRAMADOL HYDROCHLORIDE 50 MG: 50 TABLET ORAL at 15:35

## 2024-07-05 RX ADMIN — TRAMADOL HYDROCHLORIDE 50 MG: 50 TABLET ORAL at 08:28

## 2024-07-05 RX ADMIN — METRONIDAZOLE 500 MG: 500 INJECTION, SOLUTION INTRAVENOUS at 11:41

## 2024-07-05 RX ADMIN — ONDANSETRON 4 MG: 4 TABLET, ORALLY DISINTEGRATING ORAL at 10:30

## 2024-07-05 RX ADMIN — TRAMADOL HYDROCHLORIDE 50 MG: 50 TABLET ORAL at 23:40

## 2024-07-05 RX ADMIN — ASPIRIN 81 MG: 81 TABLET, COATED ORAL at 10:23

## 2024-07-05 RX ADMIN — ROSUVASTATIN CALCIUM 10 MG: 10 TABLET, FILM COATED ORAL at 19:40

## 2024-07-05 RX ADMIN — METHOCARBAMOL 500 MG: 500 TABLET ORAL at 12:01

## 2024-07-05 RX ADMIN — METHOCARBAMOL 500 MG: 500 TABLET ORAL at 19:40

## 2024-07-05 RX ADMIN — METHOCARBAMOL 500 MG: 500 TABLET ORAL at 08:28

## 2024-07-05 RX ADMIN — METRONIDAZOLE 500 MG: 500 INJECTION, SOLUTION INTRAVENOUS at 05:01

## 2024-07-05 RX ADMIN — ACETAMINOPHEN 650 MG: 325 TABLET ORAL at 22:24

## 2024-07-05 RX ADMIN — METHOCARBAMOL 500 MG: 500 TABLET ORAL at 15:35

## 2024-07-05 RX ADMIN — TRAMADOL HYDROCHLORIDE 50 MG: 50 TABLET ORAL at 19:39

## 2024-07-05 RX ADMIN — METRONIDAZOLE 500 MG: 500 INJECTION, SOLUTION INTRAVENOUS at 19:42

## 2024-07-05 RX ADMIN — WATER 1000 MG: 1 INJECTION INTRAMUSCULAR; INTRAVENOUS; SUBCUTANEOUS at 08:29

## 2024-07-05 RX ADMIN — METOPROLOL SUCCINATE 50 MG: 50 TABLET, EXTENDED RELEASE ORAL at 08:28

## 2024-07-05 ASSESSMENT — PAIN SCALES - GENERAL
PAINLEVEL_OUTOF10: 6
PAINLEVEL_OUTOF10: 2
PAINLEVEL_OUTOF10: 4
PAINLEVEL_OUTOF10: 7
PAINLEVEL_OUTOF10: 6
PAINLEVEL_OUTOF10: 6
PAINLEVEL_OUTOF10: 2
PAINLEVEL_OUTOF10: 7
PAINLEVEL_OUTOF10: 4

## 2024-07-05 ASSESSMENT — PAIN DESCRIPTION - DESCRIPTORS
DESCRIPTORS: DISCOMFORT
DESCRIPTORS: ACHING
DESCRIPTORS: ACHING
DESCRIPTORS: DISCOMFORT
DESCRIPTORS: TENDER;SORE;DISCOMFORT

## 2024-07-05 ASSESSMENT — PAIN DESCRIPTION - LOCATION
LOCATION: ABDOMEN

## 2024-07-05 ASSESSMENT — PAIN DESCRIPTION - PAIN TYPE
TYPE: SURGICAL PAIN
TYPE: SURGICAL PAIN

## 2024-07-05 ASSESSMENT — PAIN - FUNCTIONAL ASSESSMENT: PAIN_FUNCTIONAL_ASSESSMENT: PREVENTS OR INTERFERES SOME ACTIVE ACTIVITIES AND ADLS

## 2024-07-05 ASSESSMENT — PAIN DESCRIPTION - ORIENTATION: ORIENTATION: MID

## 2024-07-05 NOTE — PROGRESS NOTES
Comprehensive Nutrition Assessment    Type and Reason for Visit:  Initial, Positive Nutrition Screen (LOS)    Nutrition Recommendations/Plan:   Continue Current Diet  Continue ONS (high calorie/high protein) TID  Begin Magic Cup BID  Consult RD as needed      Malnutrition Assessment:  Malnutrition Status:  Severe malnutrition (07/05/24 1625)    Context:  Social/Environmental Circumstances     Findings of the 6 clinical characteristics of malnutrition:  Energy Intake:  50% or less estimated energy requirements for 1 month or longer  Weight Loss:  Mild weight loss (specify amount and time period) (-3.5% in six months)     Body Fat Loss:  Severe body fat loss Orbital, Triceps   Muscle Mass Loss:  Mild muscle mass loss Clavicles (pectoralis & deltoids), Temples (temporalis)  Fluid Accumulation:  No significant fluid accumulation     Strength:  Not Performed    Nutrition Assessment:    Pt admit w/ Perforated diverticulitis w/ sepsis, colonic mass, pneumoperitoneum, UTI. Pt POD# 4 Exploratory laparotomy, sigmoid colon resection with end descending colostomy, takedown of colovesical fistula with repair of bladder, mobilization of splenic flexure, mobilization and placement of omental flap and open drainage of intraabdominal abscess. PMHx: HTN, HLD. Pt on regular diet, eating 1-25% of all meals. Pt meets criteria for severe malnutrition based soley on BMI. Pt states she has always been thin and eats healthy. Pt's significant other states she eats \"like a bird\" and needs to eat more. Possible disordered eating. ONS ordered TID per MD, will begin magic cup BID, continue inpatient monitoring, f/up per policy.    Nutrition Related Findings:    A/O x4, I/O +4812 since admit, abd wdl, bowel sounds x4 active, e'lytes wnl, Ca+ 8.5, RBC 3.35, Hgb 10.4, Hct 31.1, LLQ Colostomy, RLQ Bulb suction, No output from Osotomy Wound Type: Surgical Incision       Current Nutrition Intake & Therapies:    Average Meal Intake: 1-25%  Average

## 2024-07-05 NOTE — PROGRESS NOTES
Progress  Note  Chief Complaint   Patient presents with    Dysuria     With urinary urgency, reports she has been seen at  for UTI and given an antibiotic, symptoms not improving      Historical Issues:  Current Facility-Administered Medications   Medication Dose Route Frequency Provider Last Rate Last Admin    rosuvastatin (CRESTOR) tablet 10 mg  10 mg Oral Nightly Low Mann MD        vitamin D (ERGOCALCIFEROL) capsule 50,000 Units  50,000 Units Oral Weekly Low Mann MD        calcium elemental (OSCAL) tablet 500 mg  500 mg Oral Daily Low Mann MD        aspirin EC tablet 81 mg  81 mg Oral Daily Low Mann MD        metoprolol succinate (TOPROL XL) extended release tablet 50 mg  50 mg Oral Daily Low Mann MD   50 mg at 07/05/24 0828    traMADol (ULTRAM) tablet 25 mg  25 mg Oral Q6H PRN Abdi Day MD        Or    traMADol (ULTRAM) tablet 50 mg  50 mg Oral Q6H PRN Abdi Day MD   50 mg at 07/05/24 0828    methocarbamol (ROBAXIN) tablet 500 mg  500 mg Oral 4x Daily Abdi Day MD   500 mg at 07/05/24 0828    sodium chloride flush 0.9 % injection 5-40 mL  5-40 mL IntraVENous 2 times per day Abdi Day MD   10 mL at 07/04/24 1956    sodium chloride flush 0.9 % injection 5-40 mL  5-40 mL IntraVENous PRN Abdi Day MD        0.9 % sodium chloride infusion   IntraVENous PRN Abdi Day MD        potassium chloride (KLOR-CON M) extended release tablet 40 mEq  40 mEq Oral PRN Abdi Day MD        Or    potassium bicarb-citric acid (EFFER-K) effervescent tablet 40 mEq  40 mEq Oral PRN Abdi Day MD        Or    potassium chloride 10 mEq/100 mL IVPB (Peripheral Line)  10 mEq IntraVENous PRN Abdi Day MD        magnesium sulfate 2000 mg in 50 mL IVPB premix  2,000 mg IntraVENous PRN Abdi Day MD        ondansetron (ZOFRAN-ODT) disintegrating tablet 4 mg  4 mg Oral Q8H PRN Abdi Day MD        Or    ondansetron (ZOFRAN)

## 2024-07-05 NOTE — CARE COORDINATION
CM note: consult noted for home health care, patient had originally declined [facility] however she does have an open wound with packing.  CM met with patient, she has no hx of [facility] nor any preference of a provider.  Pt gave CM permission to locate local [facility] provider that was in network with her commercial insurance plan.  Call placed to [facility] as they are in network with most [employer] [facility] plans, office to run insurance and will return call to .        7/5  ADDENDUM:10:56 AM  Pt has been accepted by [facility] but will have a co-pay, they will discuss this with the patient on Monday as patient is not anticipated to discharge prior to Monday as she is not having any ostomy output yet and would benefit from additional ostomy teaching by ostomy nurse as she was not feeling well on Wednesday.  Will NEED final [facility] orders with specific wound care orders.

## 2024-07-05 NOTE — PROGRESS NOTES
Physical Therapy  Physical Therapy Treatment Note/Plan of Care    Room #:  0315/0315-02  Patient Name: Cherrie Weems  YOB: 1960  MRN: [id number]    Date of Service: 7/5/2024     Tentative placement recommendation: Home with Home Health Physical Therapy   Equipment recommendation: Wheeled Walker      Evaluating Physical Therapist: Yaron Ambriz, PT, DPT #[id number]      Specific Provider Orders/Date/Referring Provider :     07/03/24 0715    PT eval and treat  Start:  07/03/24 0715,   End:  07/03/24 0715,   ONE TIME,   Standing Count:  1 Occurrences,   R       Shay, Abdi DANIELS MD Acknowledge New    Admitting Diagnosis:   Pneumoperitoneum [K66.8]  Diverticulitis of colon [K57.32]  Perforated viscus [R19.8]  Colonic mass [K63.89]      Surgery: Sigmoid Colon Resection with Colostomy on 7/1/24  Visit Diagnoses         Codes    Colonic mass     K63.89    Diverticulitis of colon     K57.32    Diverticulitis     K57.92            Patient Active Problem List   Diagnosis    Primary hypertension    Mixed hyperlipidemia    Vitamin D deficiency    Panlobular emphysema (HCC)    Perforated viscus    Pneumoperitoneum        ASSESSMENT of Current Deficits Patient exhibits decreased strength, balance, and endurance impairing functional mobility, transfers, gait , gait distance, and tolerance to activity. Patient benefits from use of wheeled walker for inc stability and decreased assist level during ambulation. Patient able to amb 2x60' with min assist, however pain during ambulation. Cues for log roll with bed mobility to decrease pressure on abdomin.       PHYSICAL THERAPY  PLAN OF CARE       Physical therapy plan of care is established based on physician order,  patient diagnosis and clinical assessment    Current Treatment Recommendations:    -Bed Mobility: Lower and upper extremity exercises, and trunk control activities  -Sitting Balance: Incorporate reaching activities to activate trunk muscles , Hands on support to  pertinent past medical history.  Past Surgical History:   Procedure Laterality Date    SMALL INTESTINE SURGERY N/A 7/1/2024    EXPLORATORY LAPAROTOMY SIGMOID COLON RESECTION POSS COLOSTOMY performed by Abdi Day MD at [facility] OR       SUBJECTIVE:    Precautions: Up with assistance, falls and abdominal precautions      Social history: Patient lives with boyfriend in a ranch home  with 4 steps, bilateral rail or ramp to enter home  Walk in shower  , grab bars, built in shower chair      Equipment owned: Shower chair and grab bars    AM-PAC Basic Mobility       AM-PAC Basic Mobility - Inpatient   How much help is needed turning from your back to your side while in a flat bed without using bedrails?: A Little  How much help is needed moving from lying on your back to sitting on the side of a flat bed without using bedrails?: A Little  How much help is needed moving to and from a bed to a chair?: A Little  How much help is needed standing up from a chair using your arms?: A Little  How much help is needed walking in hospital room?: A Little  How much help is needed climbing 3-5 steps with a railing?: A Lot  AM-PAC Inpatient Mobility Raw Score : 17  AM-PAC Inpatient T-Scale Score : 42.13  Mobility Inpatient CMS 0-100% Score: 50.57  Mobility Inpatient CMS G-Code Modifier : CK    Nursing cleared patient for PT treatment. Patient's spouse present during session.    OBJECTIVE:   Initial Evaluation  Date: 7/3/2024 Treatment Date:  7/5/2024   Short Term/ Long Term   Goals   Was pt agreeable to Eval/treatment? Yes yes To be met in 2 days   Pain level   6/10  Abdomen Stomach pain, no number assigned    Bed Mobility  Using rails and head of bed elevated:     Rolling: Minimal assist of 1    Supine to sit: Minimal assist of 1    Sit to supine: Minimal assist of 1    Scooting: Minimal assist of 1   Using rails and head of bed elevated:     Rolling: Supervision    Supine to sit: Minimal assist of 1 log roll  Sit to supine:

## 2024-07-05 NOTE — PROGRESS NOTES
Surgery Progress Note            Chief complaint:   Chief Complaint   Patient presents with    Dysuria     With urinary urgency, reports she has been seen at  for UTI and given an antibiotic, symptoms not improving       Patient Active Problem List   Diagnosis    Primary hypertension    Mixed hyperlipidemia    Vitamin D deficiency    Panlobular emphysema (HCC)    Perforated viscus    Pneumoperitoneum       S: had some nausea yesterday    O:   Vitals:    07/05/24 0522   BP: (!) 170/89   Pulse: 73   Resp: 18   Temp: 98 °F (36.7 °C)   SpO2: 96%       Intake/Output Summary (Last 24 hours) at 7/5/2024 0646  Last data filed at 7/5/2024 0507  Gross per 24 hour   Intake 2918.62 ml   Output 2760 ml   Net 158.62 ml             Labs:  Lab Results   Component Value Date/Time    WBC 13.6 07/05/2024 03:21 AM    WBC 11.5 07/04/2024 03:29 AM    WBC 11.7 07/03/2024 12:37 PM    HGB 10.4 07/05/2024 03:21 AM    HGB 8.9 07/04/2024 03:29 AM    HGB 8.6 07/03/2024 12:37 PM    HCT 31.1 07/05/2024 03:21 AM    HCT 27.5 07/04/2024 03:29 AM    HCT 26.6 07/03/2024 12:37 PM     Lab Results   Component Value Date    CREATININE 0.9 07/02/2024    BUN 13 07/02/2024     07/02/2024    K 3.9 07/02/2024     07/02/2024    CO2 21 (L) 07/02/2024     No results found for: \"LIPASE\", \"AMYLASE\"      Physical exam:   BP (!) 170/89   Pulse 73   Temp 98 °F (36.7 °C) (Oral)   Resp 18   Ht 1.676 m (5' 6\")   Wt 49.9 kg (110 lb)   SpO2 96%   BMI 17.75 kg/m²   General appearance: NAD  Head: NCAT  Neck: supple, no masses  Lungs: equal chest rise bilateral  Heart: S1S2 present  Abdomen: soft, moderately tender, minimally distended,  Incisions c/d/bandaged. Ostomy pink patent and no output  Skin; no lesions  Gu: no cva tenderness  Extremities: extremities normal, atraumatic, no cyanosis or edema    A:  POD # 4 Exploratory laparotomy, sigmoid colon resection with end descending colostomy, takedown of colovesical fistula with repair of bladder,

## 2024-07-06 LAB
ERYTHROCYTE [DISTWIDTH] IN BLOOD BY AUTOMATED COUNT: 12.7 % (ref 11.5–15)
HCT VFR BLD AUTO: 32.5 % (ref 34–48)
HGB BLD-MCNC: 10.8 G/DL (ref 11.5–15.5)
MCH RBC QN AUTO: 29.8 PG (ref 26–35)
MCHC RBC AUTO-ENTMCNC: 33.2 G/DL (ref 32–34.5)
MCV RBC AUTO: 89.8 FL (ref 80–99.9)
PLATELET # BLD AUTO: 425 K/UL (ref 130–450)
PMV BLD AUTO: 8.6 FL (ref 7–12)
RBC # BLD AUTO: 3.62 M/UL (ref 3.5–5.5)
WBC OTHER # BLD: 12.8 K/UL (ref 4.5–11.5)

## 2024-07-06 PROCEDURE — 2580000003 HC RX 258: Performed by: SURGERY

## 2024-07-06 PROCEDURE — 85027 COMPLETE CBC AUTOMATED: CPT

## 2024-07-06 PROCEDURE — 6370000000 HC RX 637 (ALT 250 FOR IP): Performed by: SURGERY

## 2024-07-06 PROCEDURE — 6360000002 HC RX W HCPCS: Performed by: SURGERY

## 2024-07-06 PROCEDURE — 6370000000 HC RX 637 (ALT 250 FOR IP): Performed by: INTERNAL MEDICINE

## 2024-07-06 PROCEDURE — 1200000000 HC SEMI PRIVATE

## 2024-07-06 PROCEDURE — 36415 COLL VENOUS BLD VENIPUNCTURE: CPT

## 2024-07-06 PROCEDURE — 99232 SBSQ HOSP IP/OBS MODERATE 35: CPT | Performed by: INTERNAL MEDICINE

## 2024-07-06 PROCEDURE — 6370000000 HC RX 637 (ALT 250 FOR IP): Performed by: FAMILY MEDICINE

## 2024-07-06 RX ADMIN — TRAMADOL HYDROCHLORIDE 50 MG: 50 TABLET ORAL at 11:58

## 2024-07-06 RX ADMIN — ENOXAPARIN SODIUM 40 MG: 100 INJECTION SUBCUTANEOUS at 07:46

## 2024-07-06 RX ADMIN — ACETAMINOPHEN 650 MG: 325 TABLET ORAL at 06:18

## 2024-07-06 RX ADMIN — METRONIDAZOLE 500 MG: 500 INJECTION, SOLUTION INTRAVENOUS at 13:50

## 2024-07-06 RX ADMIN — METHOCARBAMOL 500 MG: 500 TABLET ORAL at 13:48

## 2024-07-06 RX ADMIN — ASPIRIN 81 MG: 81 TABLET, COATED ORAL at 07:47

## 2024-07-06 RX ADMIN — METRONIDAZOLE 500 MG: 500 INJECTION, SOLUTION INTRAVENOUS at 20:02

## 2024-07-06 RX ADMIN — METHOCARBAMOL 500 MG: 500 TABLET ORAL at 18:03

## 2024-07-06 RX ADMIN — ROSUVASTATIN CALCIUM 10 MG: 10 TABLET, FILM COATED ORAL at 20:01

## 2024-07-06 RX ADMIN — WATER 1000 MG: 1 INJECTION INTRAMUSCULAR; INTRAVENOUS; SUBCUTANEOUS at 07:47

## 2024-07-06 RX ADMIN — ACETAMINOPHEN 650 MG: 325 TABLET ORAL at 18:53

## 2024-07-06 RX ADMIN — TRAMADOL HYDROCHLORIDE 50 MG: 50 TABLET ORAL at 07:47

## 2024-07-06 RX ADMIN — METHOCARBAMOL 500 MG: 500 TABLET ORAL at 07:47

## 2024-07-06 RX ADMIN — METOPROLOL SUCCINATE 50 MG: 50 TABLET, EXTENDED RELEASE ORAL at 07:47

## 2024-07-06 RX ADMIN — TRAMADOL HYDROCHLORIDE 50 MG: 50 TABLET ORAL at 16:05

## 2024-07-06 RX ADMIN — CALCIUM 500 MG: 500 TABLET ORAL at 07:47

## 2024-07-06 RX ADMIN — TRAMADOL HYDROCHLORIDE 50 MG: 50 TABLET ORAL at 03:37

## 2024-07-06 RX ADMIN — ACETAMINOPHEN 650 MG: 325 TABLET ORAL at 23:04

## 2024-07-06 RX ADMIN — METHOCARBAMOL 500 MG: 500 TABLET ORAL at 20:00

## 2024-07-06 RX ADMIN — METRONIDAZOLE 500 MG: 500 INJECTION, SOLUTION INTRAVENOUS at 03:38

## 2024-07-06 RX ADMIN — TRAMADOL HYDROCHLORIDE 50 MG: 50 TABLET ORAL at 20:01

## 2024-07-06 RX ADMIN — ACETAMINOPHEN 650 MG: 325 TABLET ORAL at 10:41

## 2024-07-06 ASSESSMENT — PAIN DESCRIPTION - ORIENTATION
ORIENTATION: MID

## 2024-07-06 ASSESSMENT — PAIN DESCRIPTION - LOCATION
LOCATION: ABDOMEN

## 2024-07-06 ASSESSMENT — PAIN SCALES - GENERAL
PAINLEVEL_OUTOF10: 3
PAINLEVEL_OUTOF10: 7
PAINLEVEL_OUTOF10: 5
PAINLEVEL_OUTOF10: 7
PAINLEVEL_OUTOF10: 5
PAINLEVEL_OUTOF10: 5
PAINLEVEL_OUTOF10: 6
PAINLEVEL_OUTOF10: 6
PAINLEVEL_OUTOF10: 7
PAINLEVEL_OUTOF10: 6
PAINLEVEL_OUTOF10: 4
PAINLEVEL_OUTOF10: 4
PAINLEVEL_OUTOF10: 7
PAINLEVEL_OUTOF10: 3
PAINLEVEL_OUTOF10: 5

## 2024-07-06 ASSESSMENT — PAIN DESCRIPTION - DESCRIPTORS
DESCRIPTORS: ACHING
DESCRIPTORS: BURNING;TENDER
DESCRIPTORS: ACHING

## 2024-07-06 NOTE — PLAN OF CARE
Problem: Discharge Planning  Goal: Discharge to home or other facility with appropriate resources  Outcome: Progressing     Problem: ABCDS Injury Assessment  Goal: Absence of physical injury  Outcome: Progressing     Problem: Pain  Goal: Verbalizes/displays adequate comfort level or baseline comfort level  Outcome: Progressing     Problem: Safety - Adult  Goal: Free from fall injury  Outcome: Progressing     Problem: Nutrition Deficit:  Goal: Optimize nutritional status  7/5/2024 2301 by Beba Blackwell RN  Outcome: Progressing  7/5/2024 1629 by Divine Guevara RD, LD  Outcome: Progressing  Flowsheets (Taken 7/5/2024 1629)  Nutrient intake appropriate for improving, restoring, or maintaining nutritional needs:   Assess nutritional status and recommend course of action   Recommend appropriate diets, oral nutritional supplements, and vitamin/mineral supplements   Monitor oral intake, labs, and treatment plans

## 2024-07-06 NOTE — PROGRESS NOTES
General Surgery Progress Note  [facility]    Patient's Name/Date of Birth: Cherrie Weems / 1960    Date: July 6, 2024     Surgeon: MD Johnie    Chief Complaint: Perforated viscus    Patient Active Problem List   Diagnosis    Primary hypertension    Mixed hyperlipidemia    Vitamin D deficiency    Panlobular emphysema (HCC)    Perforated viscus    Pneumoperitoneum       Subjective: Feels sore, though somewhat better than yesterday.  She was out of bed and ambulated down the [place]monte yesterday.  TAMY drain has been removed.  She denies any nausea.  She has minimal appetite.    Objective:  BP (!) 145/74   Pulse 72   Temp 98.7 °F (37.1 °C) (Oral)   Resp 18   Ht 1.676 m (5' 5.98\")   Wt 49.9 kg (110 lb)   SpO2 99%   BMI 17.76 kg/m²   Labs:  Recent Labs     07/04/24  0329 07/05/24  0321 07/06/24  0445   WBC 11.5 13.6* 12.8*   HGB 8.9* 10.4* 10.8*   HCT 27.5* 31.1* 32.5*     Lab Results   Component Value Date    CREATININE 0.9 07/02/2024    BUN 13 07/02/2024     07/02/2024    K 3.9 07/02/2024     07/02/2024    CO2 21 (L) 07/02/2024     No results for input(s): \"LIPASE\", \"AMYLASE\" in the last 72 hours.  CBC with Differential:    Lab Results   Component Value Date/Time    WBC 12.8 07/06/2024 04:45 AM    RBC 3.62 07/06/2024 04:45 AM    HGB 10.8 07/06/2024 04:45 AM    HCT 32.5 07/06/2024 04:45 AM     07/06/2024 04:45 AM    MCV 89.8 07/06/2024 04:45 AM    MCH 29.8 07/06/2024 04:45 AM    MCHC 33.2 07/06/2024 04:45 AM    RDW 12.7 07/06/2024 04:45 AM    LYMPHOPCT 5 07/02/2024 04:27 AM    MONOPCT 1 07/02/2024 04:27 AM    EOSPCT 0 07/02/2024 04:27 AM    BASOPCT 0 07/02/2024 04:27 AM    MONOSABS 0.10 07/02/2024 04:27 AM    LYMPHSABS 0.62 07/02/2024 04:27 AM    EOSABS 0.00 07/02/2024 04:27 AM    BASOSABS 0.00 07/02/2024 04:27 AM     CMP:    Lab Results   Component Value Date/Time     07/02/2024 04:27 AM    K 3.9 07/02/2024 04:27 AM     07/02/2024 04:27 AM    CO2 21 07/02/2024 04:27 AM

## 2024-07-06 NOTE — PROGRESS NOTES
Progress  Note  Chief Complaint   Patient presents with    Dysuria     With urinary urgency, reports she has been seen at  for UTI and given an antibiotic, symptoms not improving      Historical Issues:  Current Facility-Administered Medications   Medication Dose Route Frequency Provider Last Rate Last Admin    rosuvastatin (CRESTOR) tablet 10 mg  10 mg Oral Nightly Low Mann MD   10 mg at 07/05/24 1940    vitamin D (ERGOCALCIFEROL) capsule 50,000 Units  50,000 Units Oral Weekly Low Mann MD   50,000 Units at 07/05/24 1023    calcium elemental (OSCAL) tablet 500 mg  500 mg Oral Daily Low Mann MD   500 mg at 07/06/24 0747    aspirin EC tablet 81 mg  81 mg Oral Daily Low Mann MD   81 mg at 07/06/24 0747    traMADol (ULTRAM) tablet 25 mg  25 mg Oral Q4H PRN Ely Jett MD        Or    traMADol (ULTRAM) tablet 50 mg  50 mg Oral Q4H PRN Ely Jett MD   50 mg at 07/06/24 0747    acetaminophen (TYLENOL) tablet 650 mg  650 mg Oral Q4H PRN Michelle Campbell DO   650 mg at 07/06/24 0618    metoprolol succinate (TOPROL XL) extended release tablet 50 mg  50 mg Oral Daily Low Mann MD   50 mg at 07/06/24 0747    methocarbamol (ROBAXIN) tablet 500 mg  500 mg Oral 4x Daily Abdi Day MD   500 mg at 07/06/24 0747    sodium chloride flush 0.9 % injection 5-40 mL  5-40 mL IntraVENous 2 times per day Abdi Day MD   10 mL at 07/04/24 1956    sodium chloride flush 0.9 % injection 5-40 mL  5-40 mL IntraVENous PRN Abdi Day MD        0.9 % sodium chloride infusion   IntraVENous PRN Abdi Day MD        potassium chloride (KLOR-CON M) extended release tablet 40 mEq  40 mEq Oral PRN Abdi Day MD        Or    potassium bicarb-citric acid (EFFER-K) effervescent tablet 40 mEq  40 mEq Oral PRN Abdi Day MD        Or    potassium chloride 10 mEq/100 mL IVPB (Peripheral Line)  10 mEq IntraVENous PRN Abdi Day MD        magnesium sulfate 2000 mg

## 2024-07-07 PROBLEM — E87.1 ACUTE HYPONATREMIA: Status: ACTIVE | Noted: 2024-07-07

## 2024-07-07 LAB
ALBUMIN SERPL-MCNC: 2.6 G/DL (ref 3.5–5.2)
ALP SERPL-CCNC: 62 U/L (ref 35–104)
ALT SERPL-CCNC: 5 U/L (ref 0–32)
ANION GAP SERPL CALCULATED.3IONS-SCNC: 11 MMOL/L (ref 7–16)
AST SERPL-CCNC: 10 U/L (ref 0–31)
BASOPHILS # BLD: 0.03 K/UL (ref 0–0.2)
BASOPHILS NFR BLD: 0 % (ref 0–2)
BILIRUB SERPL-MCNC: 0.2 MG/DL (ref 0–1.2)
BUN SERPL-MCNC: 4 MG/DL (ref 6–23)
CALCIUM SERPL-MCNC: 8.4 MG/DL (ref 8.6–10.2)
CHLORIDE SERPL-SCNC: 94 MMOL/L (ref 98–107)
CO2 SERPL-SCNC: 24 MMOL/L (ref 22–29)
CREAT SERPL-MCNC: 0.5 MG/DL (ref 0.5–1)
EOSINOPHIL # BLD: 0.48 K/UL (ref 0.05–0.5)
EOSINOPHILS RELATIVE PERCENT: 4 % (ref 0–6)
ERYTHROCYTE [DISTWIDTH] IN BLOOD BY AUTOMATED COUNT: 12.7 % (ref 11.5–15)
GFR, ESTIMATED: >90 ML/MIN/1.73M2
GLUCOSE SERPL-MCNC: 145 MG/DL (ref 74–99)
HCT VFR BLD AUTO: 31.3 % (ref 34–48)
HGB BLD-MCNC: 10.5 G/DL (ref 11.5–15.5)
IMM GRANULOCYTES # BLD AUTO: 0.5 K/UL (ref 0–0.58)
IMM GRANULOCYTES NFR BLD: 4 % (ref 0–5)
LYMPHOCYTES NFR BLD: 0.8 K/UL (ref 1.5–4)
LYMPHOCYTES RELATIVE PERCENT: 6 % (ref 20–42)
MCH RBC QN AUTO: 29.9 PG (ref 26–35)
MCHC RBC AUTO-ENTMCNC: 33.5 G/DL (ref 32–34.5)
MCV RBC AUTO: 89.2 FL (ref 80–99.9)
MONOCYTES NFR BLD: 0.93 K/UL (ref 0.1–0.95)
MONOCYTES NFR BLD: 7 % (ref 2–12)
NEUTROPHILS NFR BLD: 80 % (ref 43–80)
NEUTS SEG NFR BLD: 10.72 K/UL (ref 1.8–7.3)
OSMOLALITY UR: 432 MOSM/KG (ref 300–900)
PLATELET # BLD AUTO: 427 K/UL (ref 130–450)
PMV BLD AUTO: 8.6 FL (ref 7–12)
POTASSIUM SERPL-SCNC: 3.7 MMOL/L (ref 3.5–5)
PROT SERPL-MCNC: 5.5 G/DL (ref 6.4–8.3)
RBC # BLD AUTO: 3.51 M/UL (ref 3.5–5.5)
SODIUM SERPL-SCNC: 129 MMOL/L (ref 132–146)
SODIUM UR-SCNC: 121 MMOL/L
WBC OTHER # BLD: 13.5 K/UL (ref 4.5–11.5)

## 2024-07-07 PROCEDURE — 6370000000 HC RX 637 (ALT 250 FOR IP): Performed by: SURGERY

## 2024-07-07 PROCEDURE — 99233 SBSQ HOSP IP/OBS HIGH 50: CPT | Performed by: INTERNAL MEDICINE

## 2024-07-07 PROCEDURE — 6370000000 HC RX 637 (ALT 250 FOR IP): Performed by: FAMILY MEDICINE

## 2024-07-07 PROCEDURE — 85025 COMPLETE CBC W/AUTO DIFF WBC: CPT

## 2024-07-07 PROCEDURE — 1200000000 HC SEMI PRIVATE

## 2024-07-07 PROCEDURE — 36415 COLL VENOUS BLD VENIPUNCTURE: CPT

## 2024-07-07 PROCEDURE — 6360000002 HC RX W HCPCS: Performed by: SURGERY

## 2024-07-07 PROCEDURE — 6370000000 HC RX 637 (ALT 250 FOR IP): Performed by: INTERNAL MEDICINE

## 2024-07-07 PROCEDURE — 2580000003 HC RX 258: Performed by: SURGERY

## 2024-07-07 PROCEDURE — 84300 ASSAY OF URINE SODIUM: CPT

## 2024-07-07 PROCEDURE — 80053 COMPREHEN METABOLIC PANEL: CPT

## 2024-07-07 PROCEDURE — 83935 ASSAY OF URINE OSMOLALITY: CPT

## 2024-07-07 RX ORDER — METHOCARBAMOL 500 MG/1
500 TABLET, FILM COATED ORAL 3 TIMES DAILY
Status: DISCONTINUED | OUTPATIENT
Start: 2024-07-07 | End: 2024-07-13 | Stop reason: HOSPADM

## 2024-07-07 RX ORDER — ALBUMIN (HUMAN) 12.5 G/50ML
25 SOLUTION INTRAVENOUS EVERY 12 HOURS
Status: COMPLETED | OUTPATIENT
Start: 2024-07-07 | End: 2024-07-09

## 2024-07-07 RX ORDER — NAPROXEN 250 MG/1
375 TABLET ORAL 2 TIMES DAILY WITH MEALS
Status: COMPLETED | OUTPATIENT
Start: 2024-07-07 | End: 2024-07-09

## 2024-07-07 RX ADMIN — ONDANSETRON 4 MG: 2 INJECTION INTRAMUSCULAR; INTRAVENOUS at 00:04

## 2024-07-07 RX ADMIN — ENOXAPARIN SODIUM 40 MG: 100 INJECTION SUBCUTANEOUS at 08:06

## 2024-07-07 RX ADMIN — METHOCARBAMOL 500 MG: 500 TABLET ORAL at 08:07

## 2024-07-07 RX ADMIN — TRAMADOL HYDROCHLORIDE 50 MG: 50 TABLET ORAL at 00:04

## 2024-07-07 RX ADMIN — ACETAMINOPHEN 650 MG: 325 TABLET ORAL at 21:23

## 2024-07-07 RX ADMIN — METHOCARBAMOL 500 MG: 500 TABLET ORAL at 12:12

## 2024-07-07 RX ADMIN — METOPROLOL SUCCINATE 50 MG: 50 TABLET, EXTENDED RELEASE ORAL at 08:07

## 2024-07-07 RX ADMIN — ACETAMINOPHEN 650 MG: 325 TABLET ORAL at 08:07

## 2024-07-07 RX ADMIN — TRAMADOL HYDROCHLORIDE 50 MG: 50 TABLET ORAL at 10:57

## 2024-07-07 RX ADMIN — WATER 1000 MG: 1 INJECTION INTRAMUSCULAR; INTRAVENOUS; SUBCUTANEOUS at 08:06

## 2024-07-07 RX ADMIN — METHOCARBAMOL 500 MG: 500 TABLET ORAL at 21:24

## 2024-07-07 RX ADMIN — METRONIDAZOLE 500 MG: 500 INJECTION, SOLUTION INTRAVENOUS at 12:14

## 2024-07-07 RX ADMIN — ACETAMINOPHEN 650 MG: 325 TABLET ORAL at 12:15

## 2024-07-07 RX ADMIN — ASPIRIN 81 MG: 81 TABLET, COATED ORAL at 08:07

## 2024-07-07 RX ADMIN — SODIUM CHLORIDE, PRESERVATIVE FREE 10 ML: 5 INJECTION INTRAVENOUS at 21:34

## 2024-07-07 RX ADMIN — METRONIDAZOLE 500 MG: 500 INJECTION, SOLUTION INTRAVENOUS at 05:17

## 2024-07-07 RX ADMIN — NAPROXEN 375 MG: 250 TABLET ORAL at 17:14

## 2024-07-07 RX ADMIN — METRONIDAZOLE 500 MG: 500 INJECTION, SOLUTION INTRAVENOUS at 21:28

## 2024-07-07 RX ADMIN — CALCIUM 500 MG: 500 TABLET ORAL at 08:07

## 2024-07-07 RX ADMIN — TRAMADOL HYDROCHLORIDE 50 MG: 50 TABLET ORAL at 15:43

## 2024-07-07 RX ADMIN — TRAMADOL HYDROCHLORIDE 50 MG: 50 TABLET ORAL at 05:16

## 2024-07-07 ASSESSMENT — PAIN SCALES - GENERAL
PAINLEVEL_OUTOF10: 7
PAINLEVEL_OUTOF10: 6
PAINLEVEL_OUTOF10: 6
PAINLEVEL_OUTOF10: 8
PAINLEVEL_OUTOF10: 3
PAINLEVEL_OUTOF10: 5
PAINLEVEL_OUTOF10: 6
PAINLEVEL_OUTOF10: 7
PAINLEVEL_OUTOF10: 3
PAINLEVEL_OUTOF10: 0
PAINLEVEL_OUTOF10: 6
PAINLEVEL_OUTOF10: 4
PAINLEVEL_OUTOF10: 7

## 2024-07-07 ASSESSMENT — PAIN DESCRIPTION - DESCRIPTORS
DESCRIPTORS: ACHING
DESCRIPTORS: TENDER;BURNING
DESCRIPTORS: ACHING

## 2024-07-07 ASSESSMENT — PAIN DESCRIPTION - ORIENTATION
ORIENTATION: MID
ORIENTATION: MID

## 2024-07-07 ASSESSMENT — PAIN DESCRIPTION - LOCATION
LOCATION: ABDOMEN

## 2024-07-07 NOTE — PROGRESS NOTES
Progress  Note  Chief Complaint   Patient presents with    Dysuria     With urinary urgency, reports she has been seen at  for UTI and given an antibiotic, symptoms not improving      Historical Issues:  Current Facility-Administered Medications   Medication Dose Route Frequency Provider Last Rate Last Admin    rosuvastatin (CRESTOR) tablet 10 mg  10 mg Oral Nightly Low Mann MD   10 mg at 07/06/24 2001    vitamin D (ERGOCALCIFEROL) capsule 50,000 Units  50,000 Units Oral Weekly Low Mann MD   50,000 Units at 07/05/24 1023    calcium elemental (OSCAL) tablet 500 mg  500 mg Oral Daily Low Mann MD   500 mg at 07/07/24 0807    aspirin EC tablet 81 mg  81 mg Oral Daily Low Mann MD   81 mg at 07/07/24 0807    traMADol (ULTRAM) tablet 25 mg  25 mg Oral Q4H PRN Ely Jett MD        Or    traMADol (ULTRAM) tablet 50 mg  50 mg Oral Q4H PRN Ely Jett MD   50 mg at 07/07/24 1057    acetaminophen (TYLENOL) tablet 650 mg  650 mg Oral Q4H PRN Michelle Campbell DO   650 mg at 07/07/24 0807    metoprolol succinate (TOPROL XL) extended release tablet 50 mg  50 mg Oral Daily Low Mann MD   50 mg at 07/07/24 0807    methocarbamol (ROBAXIN) tablet 500 mg  500 mg Oral 4x Daily Abdi Day MD   500 mg at 07/07/24 0807    sodium chloride flush 0.9 % injection 5-40 mL  5-40 mL IntraVENous 2 times per day Abdi Day MD   10 mL at 07/04/24 1956    sodium chloride flush 0.9 % injection 5-40 mL  5-40 mL IntraVENous PRN Abdi Day MD        0.9 % sodium chloride infusion   IntraVENous PRN Abdi Day MD        potassium chloride (KLOR-CON M) extended release tablet 40 mEq  40 mEq Oral PRN Abdi Day MD        Or    potassium bicarb-citric acid (EFFER-K) effervescent tablet 40 mEq  40 mEq Oral PRN Abdi Day MD        Or    potassium chloride 10 mEq/100 mL IVPB (Peripheral Line)  10 mEq IntraVENous PRN Abdi Day MD        magnesium sulfate 2000 mg  8.6 8.6           Principal Problem:    Perforated viscus  Active Problems:    Primary hypertension    Mixed hyperlipidemia    Panlobular emphysema (HCC)    Pneumoperitoneum    Acute hyponatremia  Resolved Problems:    * No resolved hospital problems. *      Plan:  Albumin has dropped to 2.6  Sodium 129  Ordering Ur Na & Ur Osm  Protein supplements  NSAID to reduce pain. I want to avoid narcotics because of BM.  Statin on hold due to declining nutritional numbers.    Case Discussed with:      Electronically signed by Low Mann MD on 7/7/2024 at 11:02 AM

## 2024-07-07 NOTE — PLAN OF CARE
Problem: Discharge Planning  Goal: Discharge to home or other facility with appropriate resources  7/7/2024 1115 by Grisel Campos RN  Outcome: Progressing  7/6/2024 2316 by Beba Blackwell RN  Outcome: Progressing     Problem: ABCDS Injury Assessment  Goal: Absence of physical injury  7/7/2024 1115 by Grisel Campos RN  Outcome: Progressing  7/6/2024 2316 by Beba Blackwell RN  Outcome: Progressing     Problem: Pain  Goal: Verbalizes/displays adequate comfort level or baseline comfort level  7/7/2024 1115 by Grisel Campos RN  Outcome: Progressing  7/6/2024 2316 by Beba Blackwell RN  Outcome: Progressing     Problem: Safety - Adult  Goal: Free from fall injury  7/7/2024 1115 by Grisel Campos RN  Outcome: Progressing  7/6/2024 2316 by Beba Blackwell RN  Outcome: Progressing     Problem: Nutrition Deficit:  Goal: Optimize nutritional status  7/7/2024 1115 by Grisel Campos RN  Outcome: Progressing  7/6/2024 2316 by Beba Blackwell RN  Outcome: Progressing     Problem: Skin/Tissue Integrity  Goal: Absence of new skin breakdown  Description: 1.  Monitor for areas of redness and/or skin breakdown  2.  Assess vascular access sites hourly  3.  Every 4-6 hours minimum:  Change oxygen saturation probe site  4.  Every 4-6 hours:  If on nasal continuous positive airway pressure, respiratory therapy assess nares and determine need for appliance change or resting period.  Outcome: Progressing

## 2024-07-07 NOTE — PROGRESS NOTES
General Surgery Progress Note  [facility]    Patient's Name/Date of Birth: Cherrie Weems / 1960    Date: July 7, 2024     Surgeon: MD Johnie    Chief Complaint: Perforated viscus    Patient Active Problem List   Diagnosis    Primary hypertension    Mixed hyperlipidemia    Vitamin D deficiency    Panlobular emphysema (HCC)    Perforated viscus    Pneumoperitoneum       Subjective: No new changes.  Pain is somewhat better however patient is asking for stronger pain medication than tramadol though she reports morphine is too strong.    Objective:  BP (!) 165/79   Pulse 76   Temp 97.9 °F (36.6 °C) (Oral)   Resp 17   Ht 1.676 m (5' 5.98\")   Wt 49.9 kg (110 lb)   SpO2 97%   BMI 17.76 kg/m²   Labs:  Recent Labs     07/05/24  0321 07/06/24  0445   WBC 13.6* 12.8*   HGB 10.4* 10.8*   HCT 31.1* 32.5*     Lab Results   Component Value Date    CREATININE 0.9 07/02/2024    BUN 13 07/02/2024     07/02/2024    K 3.9 07/02/2024     07/02/2024    CO2 21 (L) 07/02/2024     No results for input(s): \"LIPASE\", \"AMYLASE\" in the last 72 hours.  CBC with Differential:    Lab Results   Component Value Date/Time    WBC 12.8 07/06/2024 04:45 AM    RBC 3.62 07/06/2024 04:45 AM    HGB 10.8 07/06/2024 04:45 AM    HCT 32.5 07/06/2024 04:45 AM     07/06/2024 04:45 AM    MCV 89.8 07/06/2024 04:45 AM    MCH 29.8 07/06/2024 04:45 AM    MCHC 33.2 07/06/2024 04:45 AM    RDW 12.7 07/06/2024 04:45 AM    LYMPHOPCT 5 07/02/2024 04:27 AM    MONOPCT 1 07/02/2024 04:27 AM    EOSPCT 0 07/02/2024 04:27 AM    BASOPCT 0 07/02/2024 04:27 AM    MONOSABS 0.10 07/02/2024 04:27 AM    LYMPHSABS 0.62 07/02/2024 04:27 AM    EOSABS 0.00 07/02/2024 04:27 AM    BASOSABS 0.00 07/02/2024 04:27 AM     CMP:    Lab Results   Component Value Date/Time     07/02/2024 04:27 AM    K 3.9 07/02/2024 04:27 AM     07/02/2024 04:27 AM    CO2 21 07/02/2024 04:27 AM    BUN 13 07/02/2024 04:27 AM    CREATININE 0.9 07/02/2024 04:27  AM    LABGLOM 71 07/02/2024 04:27 AM    LABGLOM >60 10/26/2023 09:44 AM    GLUCOSE 121 07/02/2024 04:27 AM    CALCIUM 8.5 07/02/2024 04:27 AM    BILITOT 0.6 07/01/2024 04:28 AM    ALKPHOS 86 07/01/2024 04:28 AM    AST 11 07/01/2024 04:28 AM    ALT 6 07/01/2024 04:28 AM       General appearance:  NAD  Head: NCAT  Neck: supple, no masses  Lungs: Nonlabored, equal chest rise bilateral  Heart: Reg rate  Abdomen: soft, tender appropriately, midline wound clean without erythema or drainage or purulence, retention sutures in place.  Left lower quadrant colostomy is viable, patent with some air and no stool. Probed past the fascia  Skin; no lesions  Gu: no cva tenderness  Extremities: extremities normal, atraumatic, no cyanosis or edema      Assessment/Plan:  Cherrie Weems is a 64 y.o. female postop day 6 Exploratory laparotomy, sigmoid colon resection with end descending colostomy, takedown of colovesical fistula with repair of bladder, mobilization of splenic flexure, mobilization and placement of omental flap and open drainage of intraabdominal abscess.       Diet as tolerated  Monitor ostomy and await for further bowel function  Continue tramadol for pain, avoid narcotics as able  Out of bed, ambulate, Increase activity  IV antibiotics likely stop on discharge  Continue Marcus for colovesical fistula

## 2024-07-07 NOTE — PROGRESS NOTES
Pt stated ostomy bag was full of air. Instructed pt on how to unclip bag and release air. Asked pt if she would want to return demonstration. Pt declined. Reiterated to patient instructions on releasing, emptying and closing bag.

## 2024-07-07 NOTE — PLAN OF CARE
Problem: Discharge Planning  Goal: Discharge to home or other facility with appropriate resources  Outcome: Progressing     Problem: ABCDS Injury Assessment  Goal: Absence of physical injury  Outcome: Progressing     Problem: Pain  Goal: Verbalizes/displays adequate comfort level or baseline comfort level  Outcome: Progressing     Problem: Safety - Adult  Goal: Free from fall injury  Outcome: Progressing     Problem: Nutrition Deficit:  Goal: Optimize nutritional status  Outcome: Progressing

## 2024-07-08 PROBLEM — E87.6 HYPOKALEMIA: Status: ACTIVE | Noted: 2024-07-08

## 2024-07-08 PROBLEM — E87.1 HYPONATREMIA: Status: ACTIVE | Noted: 2024-07-08

## 2024-07-08 PROBLEM — K65.9 PERITONITIS (HCC): Status: ACTIVE | Noted: 2024-07-08

## 2024-07-08 LAB
ANION GAP SERPL CALCULATED.3IONS-SCNC: 10 MMOL/L (ref 7–16)
ANION GAP SERPL CALCULATED.3IONS-SCNC: 10 MMOL/L (ref 7–16)
BNP SERPL-MCNC: 1295 PG/ML (ref 0–450)
BUN SERPL-MCNC: 5 MG/DL (ref 6–23)
BUN SERPL-MCNC: 6 MG/DL (ref 6–23)
CALCIUM SERPL-MCNC: 8.2 MG/DL (ref 8.6–10.2)
CALCIUM SERPL-MCNC: 9.1 MG/DL (ref 8.6–10.2)
CHLORIDE SERPL-SCNC: 98 MMOL/L (ref 98–107)
CHLORIDE SERPL-SCNC: 98 MMOL/L (ref 98–107)
CO2 SERPL-SCNC: 26 MMOL/L (ref 22–29)
CO2 SERPL-SCNC: 28 MMOL/L (ref 22–29)
CORTIS SERPL-MCNC: 12.2 UG/DL (ref 2.7–18.4)
CORTISOL COLLECTION INFO: NORMAL
CREAT SERPL-MCNC: 0.5 MG/DL (ref 0.5–1)
CREAT SERPL-MCNC: 0.6 MG/DL (ref 0.5–1)
GFR, ESTIMATED: >90 ML/MIN/1.73M2
GFR, ESTIMATED: >90 ML/MIN/1.73M2
GLUCOSE SERPL-MCNC: 129 MG/DL (ref 74–99)
GLUCOSE SERPL-MCNC: 142 MG/DL (ref 74–99)
OSMOLALITY UR: 197 MOSM/KG (ref 300–900)
POTASSIUM SERPL-SCNC: 3.3 MMOL/L (ref 3.5–5)
POTASSIUM SERPL-SCNC: 3.8 MMOL/L (ref 3.5–5)
POTASSIUM, UR: 6.1 MMOL/L
SODIUM SERPL-SCNC: 134 MMOL/L (ref 132–146)
SODIUM SERPL-SCNC: 136 MMOL/L (ref 132–146)
SODIUM UR-SCNC: 53 MMOL/L

## 2024-07-08 PROCEDURE — 6370000000 HC RX 637 (ALT 250 FOR IP): Performed by: SURGERY

## 2024-07-08 PROCEDURE — 80048 BASIC METABOLIC PNL TOTAL CA: CPT

## 2024-07-08 PROCEDURE — 83880 ASSAY OF NATRIURETIC PEPTIDE: CPT

## 2024-07-08 PROCEDURE — 82533 TOTAL CORTISOL: CPT

## 2024-07-08 PROCEDURE — 84133 ASSAY OF URINE POTASSIUM: CPT

## 2024-07-08 PROCEDURE — 2580000003 HC RX 258: Performed by: SURGERY

## 2024-07-08 PROCEDURE — 6360000002 HC RX W HCPCS: Performed by: INTERNAL MEDICINE

## 2024-07-08 PROCEDURE — 1200000000 HC SEMI PRIVATE

## 2024-07-08 PROCEDURE — P9047 ALBUMIN (HUMAN), 25%, 50ML: HCPCS | Performed by: INTERNAL MEDICINE

## 2024-07-08 PROCEDURE — 99232 SBSQ HOSP IP/OBS MODERATE 35: CPT | Performed by: INTERNAL MEDICINE

## 2024-07-08 PROCEDURE — 6360000002 HC RX W HCPCS: Performed by: SURGERY

## 2024-07-08 PROCEDURE — 6370000000 HC RX 637 (ALT 250 FOR IP): Performed by: INTERNAL MEDICINE

## 2024-07-08 PROCEDURE — 97530 THERAPEUTIC ACTIVITIES: CPT

## 2024-07-08 PROCEDURE — 36415 COLL VENOUS BLD VENIPUNCTURE: CPT

## 2024-07-08 PROCEDURE — 6370000000 HC RX 637 (ALT 250 FOR IP): Performed by: FAMILY MEDICINE

## 2024-07-08 PROCEDURE — 83935 ASSAY OF URINE OSMOLALITY: CPT

## 2024-07-08 PROCEDURE — 84300 ASSAY OF URINE SODIUM: CPT

## 2024-07-08 PROCEDURE — 97110 THERAPEUTIC EXERCISES: CPT

## 2024-07-08 PROCEDURE — 99024 POSTOP FOLLOW-UP VISIT: CPT | Performed by: SURGERY

## 2024-07-08 RX ORDER — METRONIDAZOLE 500 MG/1
500 TABLET ORAL EVERY 8 HOURS SCHEDULED
Status: DISCONTINUED | OUTPATIENT
Start: 2024-07-08 | End: 2024-07-13 | Stop reason: HOSPADM

## 2024-07-08 RX ORDER — POTASSIUM CHLORIDE 20 MEQ/1
40 TABLET, EXTENDED RELEASE ORAL ONCE
Status: DISCONTINUED | OUTPATIENT
Start: 2024-07-08 | End: 2024-07-11

## 2024-07-08 RX ORDER — FUROSEMIDE 10 MG/ML
20 INJECTION INTRAMUSCULAR; INTRAVENOUS ONCE
Status: COMPLETED | OUTPATIENT
Start: 2024-07-08 | End: 2024-07-08

## 2024-07-08 RX ADMIN — ACETAMINOPHEN 650 MG: 325 TABLET ORAL at 13:07

## 2024-07-08 RX ADMIN — ENOXAPARIN SODIUM 40 MG: 100 INJECTION SUBCUTANEOUS at 08:37

## 2024-07-08 RX ADMIN — METRONIDAZOLE 500 MG: 500 INJECTION, SOLUTION INTRAVENOUS at 03:46

## 2024-07-08 RX ADMIN — METRONIDAZOLE 500 MG: 500 INJECTION, SOLUTION INTRAVENOUS at 13:09

## 2024-07-08 RX ADMIN — METHOCARBAMOL 500 MG: 500 TABLET ORAL at 14:47

## 2024-07-08 RX ADMIN — SODIUM CHLORIDE, PRESERVATIVE FREE 10 ML: 5 INJECTION INTRAVENOUS at 08:43

## 2024-07-08 RX ADMIN — ACETAMINOPHEN 650 MG: 325 TABLET ORAL at 03:43

## 2024-07-08 RX ADMIN — FUROSEMIDE 20 MG: 10 INJECTION, SOLUTION INTRAMUSCULAR; INTRAVENOUS at 17:34

## 2024-07-08 RX ADMIN — NAPROXEN 375 MG: 250 TABLET ORAL at 08:39

## 2024-07-08 RX ADMIN — ACETAMINOPHEN 650 MG: 325 TABLET ORAL at 17:34

## 2024-07-08 RX ADMIN — ASPIRIN 81 MG: 81 TABLET, COATED ORAL at 08:37

## 2024-07-08 RX ADMIN — WATER 1000 MG: 1 INJECTION INTRAMUSCULAR; INTRAVENOUS; SUBCUTANEOUS at 08:38

## 2024-07-08 RX ADMIN — NAPROXEN 375 MG: 250 TABLET ORAL at 17:34

## 2024-07-08 RX ADMIN — ACETAMINOPHEN 650 MG: 325 TABLET ORAL at 21:45

## 2024-07-08 RX ADMIN — ALBUMIN (HUMAN) 25 G: 0.25 INJECTION, SOLUTION INTRAVENOUS at 21:50

## 2024-07-08 RX ADMIN — METRONIDAZOLE 500 MG: 500 TABLET ORAL at 19:45

## 2024-07-08 RX ADMIN — ACETAMINOPHEN 650 MG: 325 TABLET ORAL at 08:39

## 2024-07-08 RX ADMIN — METOPROLOL SUCCINATE 50 MG: 50 TABLET, EXTENDED RELEASE ORAL at 08:37

## 2024-07-08 RX ADMIN — POTASSIUM CHLORIDE 40 MEQ: 1500 TABLET, EXTENDED RELEASE ORAL at 06:53

## 2024-07-08 RX ADMIN — METHOCARBAMOL 500 MG: 500 TABLET ORAL at 19:44

## 2024-07-08 RX ADMIN — ALBUMIN (HUMAN) 25 G: 0.25 INJECTION, SOLUTION INTRAVENOUS at 12:01

## 2024-07-08 RX ADMIN — METHOCARBAMOL 500 MG: 500 TABLET ORAL at 08:39

## 2024-07-08 RX ADMIN — ALBUMIN (HUMAN) 25 G: 0.25 INJECTION, SOLUTION INTRAVENOUS at 00:01

## 2024-07-08 RX ADMIN — CALCIUM 500 MG: 500 TABLET ORAL at 08:39

## 2024-07-08 ASSESSMENT — PAIN DESCRIPTION - ORIENTATION
ORIENTATION: MID

## 2024-07-08 ASSESSMENT — PAIN DESCRIPTION - LOCATION
LOCATION: ARM
LOCATION: ABDOMEN

## 2024-07-08 ASSESSMENT — PAIN SCALES - GENERAL
PAINLEVEL_OUTOF10: 6
PAINLEVEL_OUTOF10: 7
PAINLEVEL_OUTOF10: 6
PAINLEVEL_OUTOF10: 0
PAINLEVEL_OUTOF10: 3

## 2024-07-08 ASSESSMENT — PAIN DESCRIPTION - DESCRIPTORS
DESCRIPTORS: SHARP
DESCRIPTORS: ACHING;SORE;TENDER
DESCRIPTORS: ACHING;SORE;TENDER
DESCRIPTORS: ACHING
DESCRIPTORS: ACHING;SORE;TENDER

## 2024-07-08 NOTE — PROGRESS NOTES
Surgery Progress Note            Chief complaint:   Chief Complaint   Patient presents with    Dysuria     With urinary urgency, reports she has been seen at  for UTI and given an antibiotic, symptoms not improving       Patient Active Problem List   Diagnosis    Primary hypertension    Mixed hyperlipidemia    Vitamin D deficiency    Panlobular emphysema (HCC)    Perforated viscus    Pneumoperitoneum    Acute hyponatremia       S: improving daily    O:   Vitals:    07/08/24 0837   BP: 132/62   Pulse: 89   Resp:    Temp:    SpO2:        Intake/Output Summary (Last 24 hours) at 7/8/2024 0950  Last data filed at 7/8/2024 0941  Gross per 24 hour   Intake 180 ml   Output 2900 ml   Net -2720 ml           Labs:  Lab Results   Component Value Date/Time    WBC 13.5 07/07/2024 09:44 AM    WBC 12.8 07/06/2024 04:45 AM    WBC 13.6 07/05/2024 03:21 AM    HGB 10.5 07/07/2024 09:44 AM    HGB 10.8 07/06/2024 04:45 AM    HGB 10.4 07/05/2024 03:21 AM    HCT 31.3 07/07/2024 09:44 AM    HCT 32.5 07/06/2024 04:45 AM    HCT 31.1 07/05/2024 03:21 AM     Lab Results   Component Value Date    CREATININE 0.5 07/08/2024    BUN 5 (L) 07/08/2024     07/08/2024    K 3.3 (L) 07/08/2024    CL 98 07/08/2024    CO2 26 07/08/2024     No results found for: \"LIPASE\", \"AMYLASE\"      Physical exam:   /62   Pulse 89   Temp 97.9 °F (36.6 °C) (Oral)   Resp 18   Ht 1.676 m (5' 5.98\")   Wt 49.9 kg (110 lb)   SpO2 98%   BMI 17.76 kg/m²   General appearance: NAD  Head: NCAT  Neck: supple, no masses  Lungs: equal chest rise bilateral  Heart: S1S2 present  Abdomen: soft, mildly tender, nondistended,  Incisions clean dry and bandaged, ostomy healthy and with air output no stool  Skin; no lesions  Gu: no cva tenderness  Extremities: extremities normal, atraumatic, no cyanosis or edema    A:  POD # 7 Exploratory laparotomy, sigmoid colon resection with end descending colostomy, takedown of colovesical fistula with repair of bladder, mobilization

## 2024-07-08 NOTE — PROGRESS NOTES
[facility] Hospitalist   Progress Note    Admitting Date and Time: 6/30/2024  4:41 PM  Admit Dx: Pneumoperitoneum [K66.8]  Diverticulitis of colon [K57.32]  Perforated viscus [R19.8]  Colonic mass [K63.89]    Subjective:    Patient was admitted with Pneumoperitoneum [K66.8]  Diverticulitis of colon [K57.32]  Perforated viscus [R19.8]  Colonic mass [K63.89]. Patient denies fever, chills, cp, sob, n/v. C/o decreased appetite      potassium chloride  40 mEq Oral Once    metroNIDAZOLE  500 mg Oral 3 times per day    methocarbamol  500 mg Oral TID    naproxen  375 mg Oral BID WC    albumin human 25%  25 g IntraVENous Q12H    vitamin D  50,000 Units Oral Weekly    calcium elemental  500 mg Oral Daily    aspirin  81 mg Oral Daily    metoprolol succinate  50 mg Oral Daily    sodium chloride flush  5-40 mL IntraVENous 2 times per day    enoxaparin  40 mg SubCUTAneous Daily    cefTRIAXone (ROCEPHIN) IV  1,000 mg IntraVENous Daily    nicotine  1 patch TransDERmal Daily     traMADol, 25 mg, Q4H PRN   Or  traMADol, 50 mg, Q4H PRN  acetaminophen, 650 mg, Q4H PRN  sodium chloride flush, 5-40 mL, PRN  sodium chloride, , PRN  potassium chloride, 40 mEq, PRN   Or  potassium alternative oral replacement, 40 mEq, PRN   Or  potassium chloride, 10 mEq, PRN  magnesium sulfate, 2,000 mg, PRN  ondansetron, 4 mg, Q8H PRN   Or  ondansetron, 4 mg, Q6H PRN  ipratropium 0.5 mg-albuterol 2.5 mg, 1 Dose, Q4H PRN  diphenhydrAMINE, 25 mg, Q6H PRN         Objective:    BP (!) 155/69   Pulse 69   Temp 98.2 °F (36.8 °C)   Resp 18   Ht 1.676 m (5' 5.98\")   Wt 49.9 kg (110 lb)   SpO2 100%   BMI 17.76 kg/m²   Skin: warm and dry, no rash or erythema  Pulmonary/Chest: clear to auscultation bilaterally- no wheezes, rales or rhonchi, normal air movement, no respiratory distress  Cardiovascular: rhythm reg at rate of 70  Abdomen: soft, non-tender, non-distended, normal bowel sounds, no masses or organomegaly  Extremities: no cyanosis,

## 2024-07-08 NOTE — PROGRESS NOTES
Pt requires further education in this area   Yes Partial Yes      Treatment:  Patient practiced and was instructed/facilitated in the following treatment: Patient assisted to edge of bed educated and attempted to perform log rolling however not very successful and Sat edge of bed 15 minutes with Supervision  to increase dynamic sitting balance and activity tolerance. Performed seated exercise. Stood ambulated into hallway and back to bedside chair. Performed x 2 sit to stand for x 30 seconds and per request to get back into bed.   Positioned for comfort.       Therapeutic Exercises:  ankle pumps, long arc quad, and seated marching x 10 reps.      At end of session, patient in bed with     call light and phone within reach,  all lines and tubes intact, nursing notified.      Patient would benefit from continued skilled Physical Therapy to improve functional independence and quality of life.         Patient's/ family goals   home    Time in  906a  Time out  932a    Total Treatment Time  26 minutes    CPT codes:  Therapeutic activities (16512)   17 minutes  1 unit(s)  Therapeutic exercises (02545)   9 minutes  1 unit(s)    Selma Pal, [facility] [place]   #[id number]

## 2024-07-08 NOTE — FLOWSHEET NOTE
Inpatient Wound Care    Admit Date: 6/30/2024  4:41 PM    Reason for consult:  abdomen wound    Significant history:  History reviewed. No pertinent past medical history.    Wound history:      Findings:     07/08/24 1403   Wound 07/08/24 Abdomen Mid   Date First Assessed/Time First Assessed: 07/08/24 1403   Primary Wound Type: Surgical Type  Location: Abdomen  Wound Location Orientation: Mid   Wound Image    Wound Cleansed Cleansed with saline   Dressing/Treatment Moist to moist   Wound Length (cm) 15 cm   Wound Width (cm) 3 cm   Wound Depth (cm) 2.6 cm   Wound Surface Area (cm^2) 45 cm^2   Wound Volume (cm^3) 117 cm^3   Wound Assessment Pink/red   Drainage Amount Moderate (25-50%)   Drainage Description Serosanguinous         Impression:  surgical wound    Interventions in place:  moist dressing    Plan:  Cont with moist packing  Pt will have homecare          Chiara Ivy RN 7/8/2024 2:06 PM

## 2024-07-08 NOTE — CARE COORDINATION
CM note: Home care has been arranged with [facility], will need orders with specific wound care orders.  [facility] also requests that hospital send a few days of supplies home with patient.  Seen by ostomy nurse again today.  No output from ostomy yet.

## 2024-07-08 NOTE — PLAN OF CARE
Problem: Discharge Planning  Goal: Discharge to home or other facility with appropriate resources  7/8/2024 1037 by Lyric Richardson RN  Outcome: Progressing  7/7/2024 2339 by Ofelia Silva RN  Outcome: Progressing     Problem: ABCDS Injury Assessment  Goal: Absence of physical injury  7/8/2024 1037 by Lyric Richardson RN  Outcome: Progressing  7/7/2024 2339 by Ofelia Silva RN  Outcome: Progressing     Problem: Pain  Goal: Verbalizes/displays adequate comfort level or baseline comfort level  Outcome: Progressing     Problem: Safety - Adult  Goal: Free from fall injury  7/8/2024 1037 by Lyric Richardson RN  Outcome: Progressing  7/7/2024 2339 by Ofelia Silva RN  Outcome: Progressing     Problem: Nutrition Deficit:  Goal: Optimize nutritional status  7/8/2024 1037 by Lyric Richardson RN  Outcome: Progressing  7/7/2024 2339 by Ofelia Silva RN  Outcome: Progressing     Problem: Skin/Tissue Integrity  Goal: Absence of new skin breakdown  Description: 1.  Monitor for areas of redness and/or skin breakdown  2.  Assess vascular access sites hourly  3.  Every 4-6 hours minimum:  Change oxygen saturation probe site  4.  Every 4-6 hours:  If on nasal continuous positive airway pressure, respiratory therapy assess nares and determine need for appliance change or resting period.  7/8/2024 1037 by Lyric Richardson RN  Outcome: Progressing  7/7/2024 2339 by Ofelia Silva RN  Outcome: Progressing

## 2024-07-08 NOTE — PROGRESS NOTES
Date:7/8/24     This patient's order for metronidazole IV has been changed to PO as approved by the Pharmacy & Therapeutics and Medical Executive Committees.     If the patient should become strict NPO while on this therapy, contact the prescriber for further orders.   Lynnette Rodriguez [facility]  7/8/2024, 1:55 PM

## 2024-07-08 NOTE — PROGRESS NOTES
ET Nurse  Admit Date: 6/30/2024  4:41 PM    Reason for consult:  new colostomy    Significant history:  History reviewed. No pertinent past medical history.    Stoma assessment:  stoma good red color  not functioning  Peristomal skin is clear  Has light redness under the tape    Plan:    Lesson today  Pt observed   Inst on changing pouch emptying pouch  Inst given  Pt will have homecare        Chiara Ivy RN 7/8/2024 2:10 PM

## 2024-07-08 NOTE — CONSULTS
Department of Internal Medicine  Nephrology Nurse Practitioner Consult Note      Reason for Consult: Hyponatremia  Requesting Physician:  Low Mann MD     CHIEF COMPLAINT:  dysuria    History Obtained From:  patient, electronic medical record    HISTORY OF PRESENT ILLNESS:  Briefly Mrs. Weems is a 64-year-old female with history of HTN, hyperlipidemia, emphysema, vitamin D deficiency, who was admitted on June 30, 2024 after she presented to the ER with abdominal pain, she was found to have diverticulitis with a perforated sigmoid colon with erosion into the bladder and intra-abdominal abscess, she underwent urgent exploratory laparotomy with sigmoid colon resection with end descending colostomy and taken down of colovesical fistula with repair of bladder, on admission her sodium level was 134 mEq/L, and on July 2 was 137 mEq/L, since then there was no blood work obtained until July 7 when it was found to be low at 129 mEq/L, reason for this consultation.     Past Medical History:    History reviewed. No pertinent past medical history.  Past Surgical History:        Procedure Laterality Date    SMALL INTESTINE SURGERY N/A 7/1/2024    EXPLORATORY LAPAROTOMY SIGMOID COLON RESECTION POSS COLOSTOMY performed by Abdi Day MD at [facility] OR     Current Medications:    Current Facility-Administered Medications: methocarbamol (ROBAXIN) tablet 500 mg, 500 mg, Oral, TID  naproxen (NAPROSYN) tablet 375 mg, 375 mg, Oral, BID WC  albumin human 25% IV solution 25 g, 25 g, IntraVENous, Q12H  vitamin D (ERGOCALCIFEROL) capsule 50,000 Units, 50,000 Units, Oral, Weekly  calcium elemental (OSCAL) tablet 500 mg, 500 mg, Oral, Daily  aspirin EC tablet 81 mg, 81 mg, Oral, Daily  traMADol (ULTRAM) tablet 25 mg, 25 mg, Oral, Q4H PRN **OR** traMADol (ULTRAM) tablet 50 mg, 50 mg, Oral, Q4H PRN  acetaminophen (TYLENOL) tablet 650 mg, 650 mg, Oral, Q4H PRN  metoprolol succinate (TOPROL XL) extended release tablet 50 mg, 50 mg, Oral, 
  Final Result   1.  Findings for ongoing severe phlegmonous inflammatory mass like formation   throughout the mid/distal sigmoid colon with significant concentric diffuse   thickening of the bowel wall with multiple complicated appearing diverticular   formation, several of them appears to be perforate but walled off contained.   The inflammatory reaction forms a involucrum around the sigmoid colon.  There   is encasement of the central lumen of the affected the sigmoid colon.      2.  There also free perforation to the peritoneal cavity with free air in the   right subphrenic space along the dome of the right lobe of the liver.      3.  Follow-up study needed after improvement of inflammatory reaction to   exclude underline infiltrative malignant lesion in the wall of the sigmoid   colon.      4.  Multiple focal hypoenhancing lesions in the right lobe of the liver,   presently they are not characterized.  Can consider multiphase IV contrast   MRI probably in better advantage than CT.      Preliminary report given to Dr. Montalvo,  physician [facility] at the   time the interpretation             EKG:   ordered    ASSESSMENT:      Principal Problem:    Perforated viscus  Active Problems:    Primary hypertension    Mixed hyperlipidemia    Panlobular emphysema (HCC)  Resolved Problems:    * No resolved hospital problems. *      PLAN:    Perforated viscous  Mass like lesion  - surgery managing    Hyponatremia  Metabolic acidosis  - trend electrolytes    HTN  HLD  - hold home meds while NPO    Emphysema/COPD  - duonebs      Thank you very much for this interesting consult and we will continue to follow along. Feel free to call with any questions.      Electronically signed by Michelle Campbell DO on 6/30/2024 at 8:44 PM    NOTE: This report was transcribed using voice recognition software. Every effort was made to ensure accuracy; however, inadvertent computerized transcription errors may be present.

## 2024-07-08 NOTE — PLAN OF CARE
Problem: Discharge Planning  Goal: Discharge to home or other facility with appropriate resources  7/7/2024 2339 by Ofelia Silva RN  Outcome: Progressing  7/7/2024 1115 by Grisel Campos RN  Outcome: Progressing     Problem: ABCDS Injury Assessment  Goal: Absence of physical injury  7/7/2024 2339 by Ofelia Silva RN  Outcome: Progressing  7/7/2024 1115 by Grisel Campos RN  Outcome: Progressing     Problem: Pain  Goal: Verbalizes/displays adequate comfort level or baseline comfort level  7/7/2024 1115 by Grisel Campos RN  Outcome: Progressing     Problem: Safety - Adult  Goal: Free from fall injury  7/7/2024 2339 by Ofelia Silva RN  Outcome: Progressing  7/7/2024 1115 by Grisel Campos RN  Outcome: Progressing     Problem: Nutrition Deficit:  Goal: Optimize nutritional status  7/7/2024 2339 by Ofelia Silva RN  Outcome: Progressing  7/7/2024 1115 by Grisel Campos RN  Outcome: Progressing     Problem: Skin/Tissue Integrity  Goal: Absence of new skin breakdown  Description: 1.  Monitor for areas of redness and/or skin breakdown  2.  Assess vascular access sites hourly  3.  Every 4-6 hours minimum:  Change oxygen saturation probe site  4.  Every 4-6 hours:  If on nasal continuous positive airway pressure, respiratory therapy assess nares and determine need for appliance change or resting period.  7/7/2024 2339 by Ofelia Silva RN  Outcome: Progressing  7/7/2024 1115 by Grisel Campos RN  Outcome: Progressing

## 2024-07-08 NOTE — PROGRESS NOTES
Chart reviewed, full consultation to follow.    Briefly Mrs. Weems is a 64-year-old female with history of HTN, hyperlipidemia, emphysema, vitamin D deficiency, who was admitted on June 30, 2024 after she presented to the ER with abdominal pain, she was found to have diverticulitis with a perforated sigmoid colon with erosion into the bladder and intra-abdominal abscess, she underwent urgent exploratory laparotomy with sigmoid colon resection with end descending colostomy and taken down of colovesical fistula with repair of bladder, on admission her sodium level was 134 mEq/L, and on July 2 was 137 mEq/L, since then there was no blood work obtained until July 7 when it was found to be low at 129 mEq/L, reason for this consultation.    Hypotonic hyponatremia, probably SIADH versus hemodynamically mediated  HTN, on metoprolol succinate  Normocytic anemia, multifactorial  Perforated sigmoid colon with intra-abdominal abscesses status post sigmoid colon resection with end descending colostomy, on ceftriaxone, metronidazole  Colovesical fistula s/p repair of bladder    Plan:    Obtain urine sodium, urine potassium, urine osmolality  Fluid restriction, dry tray  Obtain proBNP in a.m.  Obtain cortisol level in a.m.  Albumin 25 g IV every 12 hours x 4 doses  Monitor sodium level    Racheal Arellano MD

## 2024-07-09 LAB
ANION GAP SERPL CALCULATED.3IONS-SCNC: 10 MMOL/L (ref 7–16)
ANION GAP SERPL CALCULATED.3IONS-SCNC: 14 MMOL/L (ref 7–16)
BUN SERPL-MCNC: 6 MG/DL (ref 6–23)
BUN SERPL-MCNC: 6 MG/DL (ref 6–23)
CALCIUM SERPL-MCNC: 8.7 MG/DL (ref 8.6–10.2)
CALCIUM SERPL-MCNC: 9.7 MG/DL (ref 8.6–10.2)
CHLORIDE SERPL-SCNC: 92 MMOL/L (ref 98–107)
CHLORIDE SERPL-SCNC: 98 MMOL/L (ref 98–107)
CO2 SERPL-SCNC: 27 MMOL/L (ref 22–29)
CO2 SERPL-SCNC: 28 MMOL/L (ref 22–29)
CREAT SERPL-MCNC: 0.5 MG/DL (ref 0.5–1)
CREAT SERPL-MCNC: 0.5 MG/DL (ref 0.5–1)
FERRITIN SERPL-MCNC: 760 NG/ML
FOLATE SERPL-MCNC: >20 NG/ML (ref 4.8–24.2)
GFR, ESTIMATED: >90 ML/MIN/1.73M2
GFR, ESTIMATED: >90 ML/MIN/1.73M2
GLUCOSE SERPL-MCNC: 107 MG/DL (ref 74–99)
GLUCOSE SERPL-MCNC: 115 MG/DL (ref 74–99)
IRON SATN MFR SERPL: 22 % (ref 15–50)
IRON SERPL-MCNC: 32 UG/DL (ref 37–145)
MAGNESIUM SERPL-MCNC: 1.8 MG/DL (ref 1.6–2.6)
PHOSPHATE SERPL-MCNC: 2.9 MG/DL (ref 2.5–4.5)
POTASSIUM SERPL-SCNC: 3.2 MMOL/L (ref 3.5–5)
POTASSIUM SERPL-SCNC: 3.8 MMOL/L (ref 3.5–5)
SODIUM SERPL-SCNC: 133 MMOL/L (ref 132–146)
SODIUM SERPL-SCNC: 136 MMOL/L (ref 132–146)
TIBC SERPL-MCNC: 146 UG/DL (ref 250–450)
VIT B12 SERPL-MCNC: >2000 PG/ML (ref 211–946)

## 2024-07-09 PROCEDURE — P9047 ALBUMIN (HUMAN), 25%, 50ML: HCPCS | Performed by: INTERNAL MEDICINE

## 2024-07-09 PROCEDURE — 80048 BASIC METABOLIC PNL TOTAL CA: CPT

## 2024-07-09 PROCEDURE — 99232 SBSQ HOSP IP/OBS MODERATE 35: CPT | Performed by: INTERNAL MEDICINE

## 2024-07-09 PROCEDURE — 82728 ASSAY OF FERRITIN: CPT

## 2024-07-09 PROCEDURE — 97116 GAIT TRAINING THERAPY: CPT

## 2024-07-09 PROCEDURE — 83735 ASSAY OF MAGNESIUM: CPT

## 2024-07-09 PROCEDURE — 6370000000 HC RX 637 (ALT 250 FOR IP): Performed by: INTERNAL MEDICINE

## 2024-07-09 PROCEDURE — 84100 ASSAY OF PHOSPHORUS: CPT

## 2024-07-09 PROCEDURE — 1200000000 HC SEMI PRIVATE

## 2024-07-09 PROCEDURE — 83540 ASSAY OF IRON: CPT

## 2024-07-09 PROCEDURE — 6360000002 HC RX W HCPCS: Performed by: SURGERY

## 2024-07-09 PROCEDURE — 82746 ASSAY OF FOLIC ACID SERUM: CPT

## 2024-07-09 PROCEDURE — 82607 VITAMIN B-12: CPT

## 2024-07-09 PROCEDURE — 6370000000 HC RX 637 (ALT 250 FOR IP): Performed by: SURGERY

## 2024-07-09 PROCEDURE — 6360000002 HC RX W HCPCS: Performed by: INTERNAL MEDICINE

## 2024-07-09 PROCEDURE — 83550 IRON BINDING TEST: CPT

## 2024-07-09 PROCEDURE — 99024 POSTOP FOLLOW-UP VISIT: CPT | Performed by: SURGERY

## 2024-07-09 PROCEDURE — 36415 COLL VENOUS BLD VENIPUNCTURE: CPT

## 2024-07-09 PROCEDURE — 6370000000 HC RX 637 (ALT 250 FOR IP): Performed by: FAMILY MEDICINE

## 2024-07-09 RX ORDER — MAGNESIUM SULFATE IN WATER 40 MG/ML
2000 INJECTION, SOLUTION INTRAVENOUS ONCE
Status: COMPLETED | OUTPATIENT
Start: 2024-07-09 | End: 2024-07-09

## 2024-07-09 RX ORDER — SENNOSIDES A AND B 8.6 MG/1
1 TABLET, FILM COATED ORAL ONCE
Status: COMPLETED | OUTPATIENT
Start: 2024-07-09 | End: 2024-07-09

## 2024-07-09 RX ORDER — POLYETHYLENE GLYCOL 3350 17 G/17G
17 POWDER, FOR SOLUTION ORAL ONCE
Status: COMPLETED | OUTPATIENT
Start: 2024-07-09 | End: 2024-07-09

## 2024-07-09 RX ORDER — POLYETHYLENE GLYCOL 3350 17 G/17G
17 POWDER, FOR SOLUTION ORAL ONCE
Status: DISCONTINUED | OUTPATIENT
Start: 2024-07-09 | End: 2024-07-09 | Stop reason: CLARIF

## 2024-07-09 RX ADMIN — ACETAMINOPHEN 650 MG: 325 TABLET ORAL at 20:04

## 2024-07-09 RX ADMIN — METRONIDAZOLE 500 MG: 500 TABLET ORAL at 22:12

## 2024-07-09 RX ADMIN — ACETAMINOPHEN 650 MG: 325 TABLET ORAL at 13:47

## 2024-07-09 RX ADMIN — METHOCARBAMOL 500 MG: 500 TABLET ORAL at 13:47

## 2024-07-09 RX ADMIN — ENOXAPARIN SODIUM 40 MG: 100 INJECTION SUBCUTANEOUS at 08:47

## 2024-07-09 RX ADMIN — MAGNESIUM SULFATE HEPTAHYDRATE 2000 MG: 40 INJECTION, SOLUTION INTRAVENOUS at 20:10

## 2024-07-09 RX ADMIN — ACETAMINOPHEN 650 MG: 325 TABLET ORAL at 01:53

## 2024-07-09 RX ADMIN — METHOCARBAMOL 500 MG: 500 TABLET ORAL at 08:50

## 2024-07-09 RX ADMIN — ASPIRIN 81 MG: 81 TABLET, COATED ORAL at 08:45

## 2024-07-09 RX ADMIN — METOPROLOL SUCCINATE 50 MG: 50 TABLET, EXTENDED RELEASE ORAL at 08:45

## 2024-07-09 RX ADMIN — METRONIDAZOLE 500 MG: 500 TABLET ORAL at 06:07

## 2024-07-09 RX ADMIN — POTASSIUM BICARBONATE 40 MEQ: 782 TABLET, EFFERVESCENT ORAL at 16:59

## 2024-07-09 RX ADMIN — ALBUMIN (HUMAN) 25 G: 0.25 INJECTION, SOLUTION INTRAVENOUS at 10:50

## 2024-07-09 RX ADMIN — CALCIUM 500 MG: 500 TABLET ORAL at 08:45

## 2024-07-09 RX ADMIN — SENNOSIDES 8.6 MG: 8.6 TABLET, FILM COATED ORAL at 10:42

## 2024-07-09 RX ADMIN — POLYETHYLENE GLYCOL 3350 17 G: 17 POWDER, FOR SOLUTION ORAL at 10:42

## 2024-07-09 RX ADMIN — METHOCARBAMOL 500 MG: 500 TABLET ORAL at 20:04

## 2024-07-09 RX ADMIN — NAPROXEN 375 MG: 250 TABLET ORAL at 08:45

## 2024-07-09 RX ADMIN — METRONIDAZOLE 500 MG: 500 TABLET ORAL at 13:47

## 2024-07-09 ASSESSMENT — PAIN DESCRIPTION - ORIENTATION
ORIENTATION: MID
ORIENTATION: MID

## 2024-07-09 ASSESSMENT — PAIN DESCRIPTION - DESCRIPTORS
DESCRIPTORS: ACHING
DESCRIPTORS: ACHING

## 2024-07-09 ASSESSMENT — PAIN SCALES - WONG BAKER: WONGBAKER_NUMERICALRESPONSE: NO HURT

## 2024-07-09 ASSESSMENT — PAIN SCALES - GENERAL
PAINLEVEL_OUTOF10: 5
PAINLEVEL_OUTOF10: 7
PAINLEVEL_OUTOF10: 2
PAINLEVEL_OUTOF10: 7

## 2024-07-09 ASSESSMENT — PAIN DESCRIPTION - PAIN TYPE: TYPE: SURGICAL PAIN

## 2024-07-09 ASSESSMENT — PAIN DESCRIPTION - LOCATION
LOCATION: ABDOMEN

## 2024-07-09 NOTE — PLAN OF CARE
Problem: Discharge Planning  Goal: Discharge to home or other facility with appropriate resources  Outcome: Progressing     Problem: ABCDS Injury Assessment  Goal: Absence of physical injury  Outcome: Progressing     Problem: Pain  Goal: Verbalizes/displays adequate comfort level or baseline comfort level  Outcome: Progressing     Problem: Safety - Adult  Goal: Free from fall injury  Outcome: Progressing     Problem: Nutrition Deficit:  Goal: Optimize nutritional status  Outcome: Progressing     Problem: Skin/Tissue Integrity  Goal: Absence of new skin breakdown  Description: 1.  Monitor for areas of redness and/or skin breakdown  2.  Assess vascular access sites hourly  3.  Every 4-6 hours minimum:  Change oxygen saturation probe site  4.  Every 4-6 hours:  If on nasal continuous positive airway pressure, respiratory therapy assess nares and determine need for appliance change or resting period.  Outcome: Progressing

## 2024-07-09 NOTE — PROGRESS NOTES
[facility] Hospitalist   Progress Note    Admitting Date and Time: 6/30/2024  4:41 PM  Admit Dx: Pneumoperitoneum [K66.8]  Diverticulitis of colon [K57.32]  Perforated viscus [R19.8]  Colonic mass [K63.89]    Subjective:    Patient was admitted with Pneumoperitoneum [K66.8]  Diverticulitis of colon [K57.32]  Perforated viscus [R19.8]  Colonic mass [K63.89]. Patient denies fever, chills, cp, sob, n/v.     magnesium sulfate  2,000 mg IntraVENous Once    potassium chloride  40 mEq Oral Once    metroNIDAZOLE  500 mg Oral 3 times per day    methocarbamol  500 mg Oral TID    vitamin D  50,000 Units Oral Weekly    calcium elemental  500 mg Oral Daily    aspirin  81 mg Oral Daily    metoprolol succinate  50 mg Oral Daily    sodium chloride flush  5-40 mL IntraVENous 2 times per day    enoxaparin  40 mg SubCUTAneous Daily    cefTRIAXone (ROCEPHIN) IV  1,000 mg IntraVENous Daily    nicotine  1 patch TransDERmal Daily     traMADol, 25 mg, Q4H PRN   Or  traMADol, 50 mg, Q4H PRN  acetaminophen, 650 mg, Q4H PRN  sodium chloride flush, 5-40 mL, PRN  sodium chloride, , PRN  potassium chloride, 40 mEq, PRN   Or  potassium alternative oral replacement, 40 mEq, PRN   Or  potassium chloride, 10 mEq, PRN  magnesium sulfate, 2,000 mg, PRN  ondansetron, 4 mg, Q8H PRN   Or  ondansetron, 4 mg, Q6H PRN  ipratropium 0.5 mg-albuterol 2.5 mg, 1 Dose, Q4H PRN  diphenhydrAMINE, 25 mg, Q6H PRN         Objective:    BP (!) 193/87   Pulse 70   Temp 97.8 °F (36.6 °C) (Oral)   Resp 16   Ht 1.676 m (5' 5.98\")   Wt 49.9 kg (110 lb)   SpO2 98%   BMI 17.76 kg/m²   Skin: warm and dry, no rash or erythema  Pulmonary/Chest: clear to auscultation bilaterally- no wheezes, rales or rhonchi, normal air movement, no respiratory distress  Cardiovascular: rhythm reg at rate of 72  Abdomen: soft, non-tender, non-distended, normal bowel sounds, no masses or organomegaly  Extremities: no cyanosis, no clubbing, and no edema      Recent Labs

## 2024-07-09 NOTE — PROGRESS NOTES
Department of Internal Medicine  Nephrology Consult Note      Events reviewed.    SUBJECTIVE: We are following Mrs. Weems for hyponatremia.  Reports feeling much better.    PHYSICAL EXAM:      Vitals:    VITALS:  BP (!) 150/84   Pulse 70   Temp 98.4 °F (36.9 °C) (Oral)   Resp 16   Ht 1.676 m (5' 5.98\")   Wt 49.9 kg (110 lb)   SpO2 96%   BMI 17.76 kg/m²   24HR INTAKE/OUTPUT:    Intake/Output Summary (Last 24 hours) at 7/9/2024 1254  Last data filed at 7/9/2024 1243  Gross per 24 hour   Intake 120 ml   Output 2900 ml   Net -2780 ml         Constitutional:  Awake, alert, oriented, in NAD  HEENT:  PERRLA, normocephalic, atraumatic  Respiratory:  CTA  Cardiovascular/Edema:  RRR, normal S1, normal S2  Gastrointestinal:  Soft, flat, non-distended, non-tender  Neurologic:  Nonfocal  Skin:  warm, dry, no rashes, no lesions  Other: + Edema    Scheduled Meds:   potassium chloride  40 mEq Oral Once    metroNIDAZOLE  500 mg Oral 3 times per day    methocarbamol  500 mg Oral TID    vitamin D  50,000 Units Oral Weekly    calcium elemental  500 mg Oral Daily    aspirin  81 mg Oral Daily    metoprolol succinate  50 mg Oral Daily    sodium chloride flush  5-40 mL IntraVENous 2 times per day    enoxaparin  40 mg SubCUTAneous Daily    cefTRIAXone (ROCEPHIN) IV  1,000 mg IntraVENous Daily    nicotine  1 patch TransDERmal Daily     Continuous Infusions:   sodium chloride       PRN Meds:.traMADol **OR** traMADol, acetaminophen, sodium chloride flush, sodium chloride, potassium chloride **OR** potassium alternative oral replacement **OR** potassium chloride, magnesium sulfate, ondansetron **OR** ondansetron, ipratropium 0.5 mg-albuterol 2.5 mg, diphenhydrAMINE    DATA:    CBC:   Lab Results   Component Value Date/Time    WBC 13.5 07/07/2024 09:44 AM    RBC 3.51 07/07/2024 09:44 AM    HGB 10.5 07/07/2024 09:44 AM    HCT 31.3 07/07/2024 09:44 AM    MCV 89.2 07/07/2024 09:44 AM    MCH 29.9 07/07/2024 09:44 AM    MCHC 33.5 07/07/2024

## 2024-07-09 NOTE — PROGRESS NOTES
ET Nurse  Admit Date: 6/30/2024  4:41 PM    Reason for consult:  new colostomy    Significant history:  History reviewed. No pertinent past medical history.    Stoma assessment:  pouch is intact no stool      Plan:    Lesson today  Inst re emptying pouch Pt has not had stool yet  Inst on pouch change  Discussed follow up  Inst re wound care  Discussed follow  up with physician    Equipment  [employer] one piece system #[id number]    Chiara Ivy RN 7/9/2024 4:03 PM

## 2024-07-09 NOTE — PROGRESS NOTES
07/09/24 1147   Encounter Summary   Encounter Overview/Reason Spiritual/Emotional Needs   Service Provided For Patient   Referral/Consult From [facility] [place]   Support System Significant other  (CW)   Last Encounter  07/09/24  (L-CW)   Complexity of Encounter High   Assessment/Intervention/Outcome   Assessment Compromised coping   Intervention Active listening;Discussed belief system/Sabianist practices/consuelo;Discussed illness injury and it’s impact;Discussed relationship with God;Explored/Affirmed feelings, thoughts, concerns;Prayer (assurance of)/[place];Read/Provided Scripture   Outcome Comfort;Engaged in conversation;Expressed Gratitude;Peace;Receptive      visited patient. Patient was laying in bed. Patient appeared to be coping.  provided a listening presence and offered prayer. Patient was grateful for chaplains visit. Spiritual care is ongoing as needed.     Blaise Black  [phone]

## 2024-07-09 NOTE — PROGRESS NOTES
Surgery Progress Note            Chief complaint:   Chief Complaint   Patient presents with    Dysuria     With urinary urgency, reports she has been seen at  for UTI and given an antibiotic, symptoms not improving       Patient Active Problem List   Diagnosis    Primary hypertension    Mixed hyperlipidemia    Vitamin D deficiency    Panlobular emphysema (HCC)    Perforated viscus    Pneumoperitoneum    Acute hyponatremia    Peritonitis (HCC)    Hypokalemia    Hyponatremia       S: improving daily    O:   Vitals:    07/09/24 0425   BP: (!) 150/84   Pulse: 70   Resp: 16   Temp: 98.4 °F (36.9 °C)   SpO2: 96%       Intake/Output Summary (Last 24 hours) at 7/9/2024 0642  Last data filed at 7/9/2024 0425  Gross per 24 hour   Intake --   Output 4950 ml   Net -4950 ml             Labs:  Lab Results   Component Value Date/Time    WBC 13.5 07/07/2024 09:44 AM    WBC 12.8 07/06/2024 04:45 AM    WBC 13.6 07/05/2024 03:21 AM    HGB 10.5 07/07/2024 09:44 AM    HGB 10.8 07/06/2024 04:45 AM    HGB 10.4 07/05/2024 03:21 AM    HCT 31.3 07/07/2024 09:44 AM    HCT 32.5 07/06/2024 04:45 AM    HCT 31.1 07/05/2024 03:21 AM     Lab Results   Component Value Date    CREATININE 0.5 07/09/2024    BUN 6 07/09/2024     07/09/2024    K 3.2 (L) 07/09/2024    CL 98 07/09/2024    CO2 28 07/09/2024     No results found for: \"LIPASE\", \"AMYLASE\"      Physical exam:   BP (!) 150/84   Pulse 70   Temp 98.4 °F (36.9 °C) (Oral)   Resp 16   Ht 1.676 m (5' 5.98\")   Wt 49.9 kg (110 lb)   SpO2 96%   BMI 17.76 kg/m²   General appearance: NAD  Head: NCAT  Neck: supple, no masses  Lungs: equal chest rise bilateral  Heart: S1S2 present  Abdomen: soft, mildly tender, nondistended,  Incisions clean dry and bandaged, ostomy healthy and with air output no stool  Skin; no lesions  Gu: no cva tenderness  Extremities: extremities normal, atraumatic, no cyanosis or edema    A:  POD # 8 Exploratory laparotomy, sigmoid colon resection with end descending

## 2024-07-09 NOTE — PROGRESS NOTES
Physical Therapy  Physical Therapy Treatment Note/Plan of Care    Room #:  0315/0315-02  Patient Name: Cherrie Weems  YOB: 1960  MRN: [id number]    Date of Service: 7/9/2024     Tentative placement recommendation: Home with Home Health Physical Therapy   Equipment recommendation: Wheeled Walker      Evaluating Physical Therapist: Yaron Ambriz, PT, DPT #[id number]      Specific Provider Orders/Date/Referring Provider :     07/03/24 0715    PT eval and treat  Start:  07/03/24 0715,   End:  07/03/24 0715,   ONE TIME,   Standing Count:  1 Occurrences,   R       Shay, Abdi DANIELS MD Acknowledge New    Admitting Diagnosis:   Pneumoperitoneum [K66.8]  Diverticulitis of colon [K57.32]  Perforated viscus [R19.8]  Colonic mass [K63.89]      Surgery: Sigmoid Colon Resection with Colostomy on 7/1/24  Visit Diagnoses         Codes    Colonic mass     K63.89    Diverticulitis of colon     K57.32    Diverticulitis     K57.92            Patient Active Problem List   Diagnosis    Primary hypertension    Mixed hyperlipidemia    Vitamin D deficiency    Panlobular emphysema (HCC)    Perforated viscus    Pneumoperitoneum    Acute hyponatremia    Peritonitis (HCC)    Hypokalemia    Hyponatremia        ASSESSMENT of Current Deficits Patient exhibits decreased strength, balance, and endurance impairing functional mobility, transfers, gait , gait distance, and tolerance to activity. Patient amb in hallway with HHA and min assist for safety. No loss of balance during ambulation and able to perform stair training. Patient states she does feel better using wheeled walker when she is not holding onto someone for support. Patient would benefit from further therapy to inc endurance training and safety.       PHYSICAL THERAPY  PLAN OF CARE       Physical therapy plan of care is established based on physician order,  patient diagnosis and clinical assessment    Current Treatment Recommendations:    -Bed Mobility: Lower and upper extremity

## 2024-07-10 PROBLEM — R79.89 ELEVATED PLATELET COUNT: Status: ACTIVE | Noted: 2024-07-10

## 2024-07-10 PROBLEM — D62 ANEMIA ASSOCIATED WITH ACUTE BLOOD LOSS: Status: ACTIVE | Noted: 2024-07-10

## 2024-07-10 LAB
ALBUMIN SERPL-MCNC: 4 G/DL (ref 3.5–5.2)
ALP SERPL-CCNC: 67 U/L (ref 35–104)
ALT SERPL-CCNC: 15 U/L (ref 0–32)
ANION GAP SERPL CALCULATED.3IONS-SCNC: 10 MMOL/L (ref 7–16)
ANION GAP SERPL CALCULATED.3IONS-SCNC: 12 MMOL/L (ref 7–16)
AST SERPL-CCNC: 31 U/L (ref 0–31)
BASOPHILS # BLD: 0.03 K/UL (ref 0–0.2)
BASOPHILS NFR BLD: 0 % (ref 0–2)
BILIRUB SERPL-MCNC: 0.3 MG/DL (ref 0–1.2)
BUN SERPL-MCNC: 7 MG/DL (ref 6–23)
BUN SERPL-MCNC: 9 MG/DL (ref 6–23)
CALCIUM SERPL-MCNC: 9.2 MG/DL (ref 8.6–10.2)
CALCIUM SERPL-MCNC: 9.2 MG/DL (ref 8.6–10.2)
CHLORIDE SERPL-SCNC: 91 MMOL/L (ref 98–107)
CHLORIDE SERPL-SCNC: 94 MMOL/L (ref 98–107)
CO2 SERPL-SCNC: 25 MMOL/L (ref 22–29)
CO2 SERPL-SCNC: 29 MMOL/L (ref 22–29)
CREAT SERPL-MCNC: 0.5 MG/DL (ref 0.5–1)
CREAT SERPL-MCNC: 0.5 MG/DL (ref 0.5–1)
EOSINOPHIL # BLD: 0.27 K/UL (ref 0.05–0.5)
EOSINOPHILS RELATIVE PERCENT: 3 % (ref 0–6)
ERYTHROCYTE [DISTWIDTH] IN BLOOD BY AUTOMATED COUNT: 12.9 % (ref 11.5–15)
GFR, ESTIMATED: >90 ML/MIN/1.73M2
GFR, ESTIMATED: >90 ML/MIN/1.73M2
GLUCOSE SERPL-MCNC: 116 MG/DL (ref 74–99)
GLUCOSE SERPL-MCNC: 118 MG/DL (ref 74–99)
HCT VFR BLD AUTO: 29.4 % (ref 34–48)
HGB BLD-MCNC: 9.8 G/DL (ref 11.5–15.5)
IMM GRANULOCYTES # BLD AUTO: 0.13 K/UL (ref 0–0.58)
IMM GRANULOCYTES NFR BLD: 2 % (ref 0–5)
LYMPHOCYTES NFR BLD: 1.24 K/UL (ref 1.5–4)
LYMPHOCYTES RELATIVE PERCENT: 14 % (ref 20–42)
MCH RBC QN AUTO: 30 PG (ref 26–35)
MCHC RBC AUTO-ENTMCNC: 33.3 G/DL (ref 32–34.5)
MCV RBC AUTO: 89.9 FL (ref 80–99.9)
MONOCYTES NFR BLD: 0.81 K/UL (ref 0.1–0.95)
MONOCYTES NFR BLD: 9 % (ref 2–12)
NEUTROPHILS NFR BLD: 72 % (ref 43–80)
NEUTS SEG NFR BLD: 6.22 K/UL (ref 1.8–7.3)
PLATELET # BLD AUTO: 462 K/UL (ref 130–450)
PMV BLD AUTO: 9.2 FL (ref 7–12)
POTASSIUM SERPL-SCNC: 4 MMOL/L (ref 3.5–5)
POTASSIUM SERPL-SCNC: 4 MMOL/L (ref 3.5–5)
PROT SERPL-MCNC: 6.5 G/DL (ref 6.4–8.3)
RBC # BLD AUTO: 3.27 M/UL (ref 3.5–5.5)
SODIUM SERPL-SCNC: 128 MMOL/L (ref 132–146)
SODIUM SERPL-SCNC: 133 MMOL/L (ref 132–146)
WBC OTHER # BLD: 8.7 K/UL (ref 4.5–11.5)

## 2024-07-10 PROCEDURE — 6370000000 HC RX 637 (ALT 250 FOR IP): Performed by: INTERNAL MEDICINE

## 2024-07-10 PROCEDURE — 85025 COMPLETE CBC W/AUTO DIFF WBC: CPT

## 2024-07-10 PROCEDURE — 80048 BASIC METABOLIC PNL TOTAL CA: CPT

## 2024-07-10 PROCEDURE — 36415 COLL VENOUS BLD VENIPUNCTURE: CPT

## 2024-07-10 PROCEDURE — 6360000002 HC RX W HCPCS: Performed by: SURGERY

## 2024-07-10 PROCEDURE — 1200000000 HC SEMI PRIVATE

## 2024-07-10 PROCEDURE — 6370000000 HC RX 637 (ALT 250 FOR IP): Performed by: SURGERY

## 2024-07-10 PROCEDURE — 99232 SBSQ HOSP IP/OBS MODERATE 35: CPT | Performed by: INTERNAL MEDICINE

## 2024-07-10 PROCEDURE — 80053 COMPREHEN METABOLIC PANEL: CPT

## 2024-07-10 PROCEDURE — 99024 POSTOP FOLLOW-UP VISIT: CPT | Performed by: SURGERY

## 2024-07-10 PROCEDURE — 2580000003 HC RX 258: Performed by: SURGERY

## 2024-07-10 PROCEDURE — 6370000000 HC RX 637 (ALT 250 FOR IP): Performed by: FAMILY MEDICINE

## 2024-07-10 RX ORDER — POLYETHYLENE GLYCOL 3350 17 G/17G
17 POWDER, FOR SOLUTION ORAL ONCE
Status: COMPLETED | OUTPATIENT
Start: 2024-07-10 | End: 2024-07-10

## 2024-07-10 RX ORDER — SENNOSIDES A AND B 8.6 MG/1
1 TABLET, FILM COATED ORAL ONCE
Status: COMPLETED | OUTPATIENT
Start: 2024-07-10 | End: 2024-07-10

## 2024-07-10 RX ADMIN — CALCIUM 500 MG: 500 TABLET ORAL at 08:52

## 2024-07-10 RX ADMIN — ACETAMINOPHEN 650 MG: 325 TABLET ORAL at 23:56

## 2024-07-10 RX ADMIN — METHOCARBAMOL 500 MG: 500 TABLET ORAL at 20:39

## 2024-07-10 RX ADMIN — ACETAMINOPHEN 650 MG: 325 TABLET ORAL at 04:21

## 2024-07-10 RX ADMIN — METHOCARBAMOL 500 MG: 500 TABLET ORAL at 13:30

## 2024-07-10 RX ADMIN — ACETAMINOPHEN 650 MG: 325 TABLET ORAL at 19:55

## 2024-07-10 RX ADMIN — METOPROLOL SUCCINATE 50 MG: 50 TABLET, EXTENDED RELEASE ORAL at 08:53

## 2024-07-10 RX ADMIN — METRONIDAZOLE 500 MG: 500 TABLET ORAL at 20:39

## 2024-07-10 RX ADMIN — ASPIRIN 81 MG: 81 TABLET, COATED ORAL at 08:53

## 2024-07-10 RX ADMIN — METHOCARBAMOL 500 MG: 500 TABLET ORAL at 08:53

## 2024-07-10 RX ADMIN — SENNOSIDES 8.6 MG: 8.6 TABLET, FILM COATED ORAL at 08:52

## 2024-07-10 RX ADMIN — METRONIDAZOLE 500 MG: 500 TABLET ORAL at 05:42

## 2024-07-10 RX ADMIN — SODIUM CHLORIDE, PRESERVATIVE FREE 10 ML: 5 INJECTION INTRAVENOUS at 20:42

## 2024-07-10 RX ADMIN — ACETAMINOPHEN 650 MG: 325 TABLET ORAL at 14:44

## 2024-07-10 RX ADMIN — ACETAMINOPHEN 650 MG: 325 TABLET ORAL at 10:14

## 2024-07-10 RX ADMIN — ENOXAPARIN SODIUM 40 MG: 100 INJECTION SUBCUTANEOUS at 08:53

## 2024-07-10 RX ADMIN — ACETAMINOPHEN 650 MG: 325 TABLET ORAL at 00:12

## 2024-07-10 RX ADMIN — METRONIDAZOLE 500 MG: 500 TABLET ORAL at 13:30

## 2024-07-10 RX ADMIN — POLYETHYLENE GLYCOL 3350 17 G: 17 POWDER, FOR SOLUTION ORAL at 08:58

## 2024-07-10 ASSESSMENT — PAIN DESCRIPTION - DESCRIPTORS
DESCRIPTORS: ACHING;DISCOMFORT
DESCRIPTORS: SORE
DESCRIPTORS: ACHING
DESCRIPTORS: NAGGING
DESCRIPTORS: SORE
DESCRIPTORS: ACHING

## 2024-07-10 ASSESSMENT — PAIN SCALES - GENERAL
PAINLEVEL_OUTOF10: 6
PAINLEVEL_OUTOF10: 3
PAINLEVEL_OUTOF10: 6
PAINLEVEL_OUTOF10: 6
PAINLEVEL_OUTOF10: 9
PAINLEVEL_OUTOF10: 6
PAINLEVEL_OUTOF10: 4
PAINLEVEL_OUTOF10: 4
PAINLEVEL_OUTOF10: 7

## 2024-07-10 ASSESSMENT — PAIN DESCRIPTION - LOCATION
LOCATION: ABDOMEN

## 2024-07-10 ASSESSMENT — PAIN DESCRIPTION - ORIENTATION
ORIENTATION: ANTERIOR
ORIENTATION: ANTERIOR

## 2024-07-10 ASSESSMENT — PAIN DESCRIPTION - PAIN TYPE
TYPE: SURGICAL PAIN
TYPE: SURGICAL PAIN

## 2024-07-10 NOTE — PROGRESS NOTES
Department of Internal Medicine  Nephrology Consult Note      Events reviewed.    SUBJECTIVE: We are following Mrs. Weems for hyponatremia.  Reports feeling much better.    PHYSICAL EXAM:      Vitals:    VITALS:  BP (!) 142/75   Pulse 74   Temp 98.3 °F (36.8 °C) (Oral)   Resp 16   Ht 1.676 m (5' 5.98\")   Wt 49.9 kg (110 lb)   SpO2 99%   BMI 17.76 kg/m²   24HR INTAKE/OUTPUT:    Intake/Output Summary (Last 24 hours) at 7/10/2024 1024  Last data filed at 7/10/2024 0813  Gross per 24 hour   Intake 120 ml   Output 2100 ml   Net -1980 ml         Constitutional:  Awake, alert, oriented, in NAD  HEENT:  PERRLA, normocephalic, atraumatic  Respiratory:  CTA  Cardiovascular/Edema:  RRR, normal S1, normal S2  Gastrointestinal:  Soft, flat, non-distended, non-tender  Neurologic:  Nonfocal  Skin:  warm, dry, no rashes, no lesions  Other: + Edema    Scheduled Meds:   potassium chloride  40 mEq Oral Once    metroNIDAZOLE  500 mg Oral 3 times per day    methocarbamol  500 mg Oral TID    vitamin D  50,000 Units Oral Weekly    calcium elemental  500 mg Oral Daily    aspirin  81 mg Oral Daily    metoprolol succinate  50 mg Oral Daily    sodium chloride flush  5-40 mL IntraVENous 2 times per day    enoxaparin  40 mg SubCUTAneous Daily    cefTRIAXone (ROCEPHIN) IV  1,000 mg IntraVENous Daily    nicotine  1 patch TransDERmal Daily     Continuous Infusions:   sodium chloride       PRN Meds:.traMADol **OR** traMADol, acetaminophen, sodium chloride flush, sodium chloride, potassium chloride **OR** potassium alternative oral replacement **OR** potassium chloride, magnesium sulfate, ondansetron **OR** ondansetron, ipratropium 0.5 mg-albuterol 2.5 mg, diphenhydrAMINE    DATA:    CBC:   Lab Results   Component Value Date/Time    WBC 8.7 07/10/2024 03:55 AM    RBC 3.27 07/10/2024 03:55 AM    HGB 9.8 07/10/2024 03:55 AM    HCT 29.4 07/10/2024 03:55 AM    MCV 89.9 07/10/2024 03:55 AM    MCH 30.0 07/10/2024 03:55 AM    MCHC 33.3 07/10/2024

## 2024-07-10 NOTE — PROGRESS NOTES
[facility] Hospitalist   Progress Note    Admitting Date and Time: 6/30/2024  4:41 PM  Admit Dx: Pneumoperitoneum [K66.8]  Diverticulitis of colon [K57.32]  Perforated viscus [R19.8]  Colonic mass [K63.89]    Subjective:    Patient was admitted with Pneumoperitoneum [K66.8]  Diverticulitis of colon [K57.32]  Perforated viscus [R19.8]  Colonic mass [K63.89]. Patient denies fever, chills, cp, sob, n/v.     potassium chloride  40 mEq Oral Once    metroNIDAZOLE  500 mg Oral 3 times per day    methocarbamol  500 mg Oral TID    vitamin D  50,000 Units Oral Weekly    calcium elemental  500 mg Oral Daily    aspirin  81 mg Oral Daily    metoprolol succinate  50 mg Oral Daily    sodium chloride flush  5-40 mL IntraVENous 2 times per day    enoxaparin  40 mg SubCUTAneous Daily    cefTRIAXone (ROCEPHIN) IV  1,000 mg IntraVENous Daily    nicotine  1 patch TransDERmal Daily     traMADol, 25 mg, Q4H PRN   Or  traMADol, 50 mg, Q4H PRN  acetaminophen, 650 mg, Q4H PRN  sodium chloride flush, 5-40 mL, PRN  sodium chloride, , PRN  potassium chloride, 40 mEq, PRN   Or  potassium alternative oral replacement, 40 mEq, PRN   Or  potassium chloride, 10 mEq, PRN  magnesium sulfate, 2,000 mg, PRN  ondansetron, 4 mg, Q8H PRN   Or  ondansetron, 4 mg, Q6H PRN  ipratropium 0.5 mg-albuterol 2.5 mg, 1 Dose, Q4H PRN  diphenhydrAMINE, 25 mg, Q6H PRN         Objective:    BP (!) 142/75   Pulse 74   Temp 98.3 °F (36.8 °C) (Oral)   Resp 16   Ht 1.676 m (5' 5.98\")   Wt 49.9 kg (110 lb)   SpO2 99%   BMI 17.76 kg/m²   Skin: warm and dry, no rash or erythema  Pulmonary/Chest: clear to auscultation bilaterally- no wheezes, rales or rhonchi, normal air movement, no respiratory distress  Cardiovascular: rhythm reg at rate of 76  Abdomen: soft, non-tender, non-distended, normal bowel sounds, no masses or organomegaly  Extremities: no cyanosis, no clubbing, and no edema      Recent Labs     07/09/24  0417 07/09/24  1627 07/10/24  035

## 2024-07-10 NOTE — FLOWSHEET NOTE
Inpatient Wound Care    Admit Date: 6/30/2024  4:41 PM    Reason for consult:  abd wound    Significant history:  History reviewed. No pertinent past medical history.    Wound history:      Findings:     07/10/24 1516   Wound 07/08/24 Abdomen Mid   Date First Assessed/Time First Assessed: 07/08/24 1403   Primary Wound Type: Surgical Type  Location: Abdomen  Wound Location Orientation: Mid   Wound Length (cm) 15 cm   Wound Width (cm) 3 cm   Wound Depth (cm) 2.6 cm   Wound Surface Area (cm^2) 45 cm^2   Change in Wound Size % (l*w) 0   Wound Volume (cm^3) 117 cm^3   Wound Healing % 0   Wound Assessment Pink/red   Drainage Amount Moderate (25-50%)   Drainage Description Serosanguinous   Odor None         Impression:  surgical wound      Interventions in place:    Inst given in changing pouch  Sister present      Plan:  Pt will have homecare        Chiara Ivy RN 7/10/2024 3:17 PM

## 2024-07-10 NOTE — PROGRESS NOTES
ET Nurse  Admit Date: 6/30/2024  4:41 PM    Reason for consult:  new colostomy    Significant history:  History reviewed. No pertinent past medical history.    Stoma assessment:  stoma good red color no stool      Plan:    Inst given in changing pouch  Discussed diet  Discussed emptpying pouch  Discussed activity    Pt will have homecare  Has supply and inst for discharge  Sister present  Receptive to assist her at home but she works long hours    Pt very nervous re care  Stoma has not functioned yet she has not emptied pouch yet            Chiara Ivy RN 7/10/2024 3:18 PM

## 2024-07-10 NOTE — CARE COORDINATION
CM note: Home care has been arranged with [facility], will need orders with specific wound care orders. [facility] also requests that hospital send a few days of supplies home with patient. Still awaiting ostomy output.  Pt ambulatory.

## 2024-07-10 NOTE — PROGRESS NOTES
Surgery Progress Note            Chief complaint:   Chief Complaint   Patient presents with    Dysuria     With urinary urgency, reports she has been seen at  for UTI and given an antibiotic, symptoms not improving       Patient Active Problem List   Diagnosis    Primary hypertension    Mixed hyperlipidemia    Vitamin D deficiency    Panlobular emphysema (HCC)    Perforated viscus    Pneumoperitoneum    Acute hyponatremia    Peritonitis (HCC)    Hypokalemia    Hyponatremia       S: improving daily    O:   Vitals:    07/10/24 0516   BP: (!) 142/75   Pulse: 74   Resp: 16   Temp: 98.3 °F (36.8 °C)   SpO2: 99%       Intake/Output Summary (Last 24 hours) at 7/10/2024 0639  Last data filed at 7/10/2024 0015  Gross per 24 hour   Intake 240 ml   Output 1400 ml   Net -1160 ml             Labs:  Lab Results   Component Value Date/Time    WBC 8.7 07/10/2024 03:55 AM    WBC 13.5 07/07/2024 09:44 AM    WBC 12.8 07/06/2024 04:45 AM    HGB 9.8 07/10/2024 03:55 AM    HGB 10.5 07/07/2024 09:44 AM    HGB 10.8 07/06/2024 04:45 AM    HCT 29.4 07/10/2024 03:55 AM    HCT 31.3 07/07/2024 09:44 AM    HCT 32.5 07/06/2024 04:45 AM     Lab Results   Component Value Date    CREATININE 0.5 07/10/2024    BUN 7 07/10/2024     07/10/2024    K 4.0 07/10/2024    CL 94 (L) 07/10/2024    CO2 29 07/10/2024     No results found for: \"LIPASE\", \"AMYLASE\"      Physical exam:   BP (!) 142/75   Pulse 74   Temp 98.3 °F (36.8 °C) (Oral)   Resp 16   Ht 1.676 m (5' 5.98\")   Wt 49.9 kg (110 lb)   SpO2 99%   BMI 17.76 kg/m²   General appearance: NAD  Head: NCAT  Neck: supple, no masses  Lungs: equal chest rise bilateral  Heart: S1S2 present  Abdomen: soft, mildly tender, nondistended,  Incisions clean dry and bandaged, ostomy healthy and with air output no stool  Skin; no lesions  Gu: no cva tenderness  Extremities: extremities normal, atraumatic, no cyanosis or edema    A:  POD # 9 Exploratory laparotomy, sigmoid colon resection with end descending

## 2024-07-10 NOTE — PROGRESS NOTES
Physician Progress Note      PATIENT:               ROB MEJIA  [facility] #:                  [id number]  :                       1960  ADMIT DATE:       2024 4:41 PM  DISCH DATE:  RESPONDING  PROVIDER #:        Abdi Cruz MD          QUERY TEXT:    Patient admitted with perforated diverticulitis. Documentation reflects Sepsis   in H&P.  If possible, please document in the progress notes and discharge   summary if sepsis was:    The medical record reflects the following:  Risk Factors: perforated diverticulitis with intra-abdominal abscess, UTI  Clinical Indicators: Wbc 14.8, UC > 100,000 E Coli, ,  Treatment: IVF 1L bolus, IV Rocephin, Flagyl, Exploratory lap, sigmoid colon   resection with end descending colostomy, takedown of colovesical fistula with   repair of bladder, mobilization of splenic flexure, mobilization and placement   of omental flap and open drainage of intraabdominal abscess.    Thank you, Corie Boggs RN [facility]  [phone]  Options provided:  -- Sepsis confirmed after study  -- Sepsis treated and resolved  -- Sepsis ruled out after study  -- Other - I will add my own diagnosis  -- Disagree - Not applicable / Not valid  -- Disagree - Clinically unable to determine / Unknown  -- Refer to Clinical Documentation Reviewer    PROVIDER RESPONSE TEXT:    Sepsis treated and resolved.    Query created by: Corie Boggs on 2024 10:35 AM      QUERY TEXT:    Patient admitted with perforated diverticulitis. Noted to have severe   malnutrition documented in Clinical Dietician assessment note . If   possible, please document in progress notes and discharge summary if you are   evaluating and /or treating any of the following:    The medical record reflects the following:  Risk Factors: Perforated diverticulitis with intra-abdominal abscess  Clinical Indicators: Per Dietician: Severe malnutrition, In context of social   or environmental circumstances related to inadequate  protein-energy intake as   evidenced by poor intake prior to admission...Mild weight loss(-3.5% in six   months), Severe body fat loss Orbital, Triceps, Mild muscle loss Clavicles,   Temporals.  BMI 17  Treatment: ONS high wei/ high protein tid, magic cup bid, regular diet.    ASPEN Criteria:    https://aspenjournals.onlinelibrary.butt.com/doi/full/10.1177/[email or web address]  5    Thank you, Corie Boggs RN [facility]  [phone]  Options provided:  -- Protein calorie malnutrition severe  -- Other - I will add my own diagnosis  -- Disagree - Not applicable / Not valid  -- Disagree - Clinically unable to determine / Unknown  -- Refer to Clinical Documentation Reviewer    PROVIDER RESPONSE TEXT:    This patient has severe protein calorie malnutrition.    Query created by: Corie Boggs on 7/9/2024 10:36 AM      Electronically signed by:  Abdi Cruz MD 7/10/2024 6:38 AM

## 2024-07-10 NOTE — PLAN OF CARE
Problem: Discharge Planning  Goal: Discharge to home or other facility with appropriate resources  7/10/2024 1150 by Debbie Gillespie RN  Outcome: Progressing  7/10/2024 0031 by Kelin Ayala RN  Outcome: Progressing     Problem: ABCDS Injury Assessment  Goal: Absence of physical injury  7/10/2024 1150 by Debbie Gillespie RN  Outcome: Progressing  7/10/2024 0031 by Kelin Ayala RN  Outcome: Progressing     Problem: Pain  Goal: Verbalizes/displays adequate comfort level or baseline comfort level  7/10/2024 1150 by Debbie Gillespie RN  Outcome: Progressing  7/10/2024 0031 by Kelin Ayala RN  Outcome: Progressing     Problem: Safety - Adult  Goal: Free from fall injury  7/10/2024 1150 by Debbie Gillespie RN  Outcome: Progressing  7/10/2024 0031 by Kelin Ayala RN  Outcome: Progressing     Problem: Nutrition Deficit:  Goal: Optimize nutritional status  7/10/2024 1150 by Debbie Gillespie RN  Outcome: Progressing  7/10/2024 0031 by Kelin Ayala RN  Outcome: Progressing     Problem: Skin/Tissue Integrity  Goal: Absence of new skin breakdown  Description: 1.  Monitor for areas of redness and/or skin breakdown  2.  Assess vascular access sites hourly  3.  Every 4-6 hours minimum:  Change oxygen saturation probe site  4.  Every 4-6 hours:  If on nasal continuous positive airway pressure, respiratory therapy assess nares and determine need for appliance change or resting period.  7/10/2024 1150 by Debbie Gillespie RN  Outcome: Progressing  7/10/2024 0031 by Kelin Ayala RN  Outcome: Progressing

## 2024-07-11 ENCOUNTER — APPOINTMENT (OUTPATIENT)
Dept: CT IMAGING | Age: 64
End: 2024-07-11
Payer: COMMERCIAL

## 2024-07-11 ENCOUNTER — APPOINTMENT (OUTPATIENT)
Age: 64
End: 2024-07-11
Attending: INTERNAL MEDICINE
Payer: COMMERCIAL

## 2024-07-11 LAB
ANION GAP SERPL CALCULATED.3IONS-SCNC: 13 MMOL/L (ref 7–16)
BASOPHILS # BLD: 0.03 K/UL (ref 0–0.2)
BASOPHILS NFR BLD: 0 % (ref 0–2)
BNP SERPL-MCNC: 1654 PG/ML (ref 0–450)
BUN SERPL-MCNC: 9 MG/DL (ref 6–23)
CALCIUM SERPL-MCNC: 9.2 MG/DL (ref 8.6–10.2)
CHLORIDE SERPL-SCNC: 93 MMOL/L (ref 98–107)
CO2 SERPL-SCNC: 24 MMOL/L (ref 22–29)
CREAT SERPL-MCNC: 0.5 MG/DL (ref 0.5–1)
ECHO AR MAX VEL PISA: 5 M/S
ECHO AV PEAK GRADIENT: 10 MMHG
ECHO AV PEAK VELOCITY: 1.5 M/S
ECHO AV REGURGITANT PHT: 1011 MILLISECOND
ECHO BSA: 1.52 M2
ECHO LA DIAMETER INDEX: 1.61 CM/M2
ECHO LA DIAMETER: 2.5 CM
ECHO LV FRACTIONAL SHORTENING: 42 % (ref 28–44)
ECHO LV INTERNAL DIMENSION DIASTOLE INDEX: 2.32 CM/M2
ECHO LV INTERNAL DIMENSION DIASTOLIC: 3.6 CM (ref 3.9–5.3)
ECHO LV INTERNAL DIMENSION SYSTOLIC INDEX: 1.35 CM/M2
ECHO LV INTERNAL DIMENSION SYSTOLIC: 2.1 CM
ECHO LV ISOVOLUMETRIC RELAXATION TIME (IVRT): 148.4 MS
ECHO LV IVSD: 0.8 CM (ref 0.6–0.9)
ECHO LV IVSS: 1.2 CM
ECHO LV MASS 2D: 85.6 G (ref 67–162)
ECHO LV MASS INDEX 2D: 55.3 G/M2 (ref 43–95)
ECHO LV POSTERIOR WALL DIASTOLIC: 0.9 CM (ref 0.6–0.9)
ECHO LV POSTERIOR WALL SYSTOLIC: 1.3 CM
ECHO LV RELATIVE WALL THICKNESS RATIO: 0.5
ECHO MV "A" WAVE DURATION: 95.2 MSEC
ECHO MV A VELOCITY: 0.8 M/S
ECHO MV E DECELERATION TIME (DT): 305.9 MS
ECHO MV E VELOCITY: 0.78 M/S
ECHO MV E/A RATIO: 0.98
ECHO RV INTERNAL DIMENSION: 3 CM
EOSINOPHIL # BLD: 0.17 K/UL (ref 0.05–0.5)
EOSINOPHILS RELATIVE PERCENT: 2 % (ref 0–6)
ERYTHROCYTE [DISTWIDTH] IN BLOOD BY AUTOMATED COUNT: 13 % (ref 11.5–15)
GFR, ESTIMATED: >90 ML/MIN/1.73M2
GLUCOSE SERPL-MCNC: 127 MG/DL (ref 74–99)
HCT VFR BLD AUTO: 32.4 % (ref 34–48)
HGB BLD-MCNC: 10.6 G/DL (ref 11.5–15.5)
IMM GRANULOCYTES # BLD AUTO: 0.11 K/UL (ref 0–0.58)
IMM GRANULOCYTES NFR BLD: 1 % (ref 0–5)
LYMPHOCYTES NFR BLD: 1.15 K/UL (ref 1.5–4)
LYMPHOCYTES RELATIVE PERCENT: 11 % (ref 20–42)
MCH RBC QN AUTO: 29.5 PG (ref 26–35)
MCHC RBC AUTO-ENTMCNC: 32.7 G/DL (ref 32–34.5)
MCV RBC AUTO: 90.3 FL (ref 80–99.9)
MONOCYTES NFR BLD: 0.68 K/UL (ref 0.1–0.95)
MONOCYTES NFR BLD: 7 % (ref 2–12)
NEUTROPHILS NFR BLD: 80 % (ref 43–80)
NEUTS SEG NFR BLD: 8.29 K/UL (ref 1.8–7.3)
PLATELET # BLD AUTO: 541 K/UL (ref 130–450)
PMV BLD AUTO: 9.1 FL (ref 7–12)
POTASSIUM SERPL-SCNC: 4.1 MMOL/L (ref 3.5–5)
RBC # BLD AUTO: 3.59 M/UL (ref 3.5–5.5)
SODIUM SERPL-SCNC: 130 MMOL/L (ref 132–146)
SURGICAL PATHOLOGY REPORT: NORMAL
WBC OTHER # BLD: 10.4 K/UL (ref 4.5–11.5)

## 2024-07-11 PROCEDURE — 6360000002 HC RX W HCPCS: Performed by: SURGERY

## 2024-07-11 PROCEDURE — 99024 POSTOP FOLLOW-UP VISIT: CPT | Performed by: SURGERY

## 2024-07-11 PROCEDURE — 97535 SELF CARE MNGMENT TRAINING: CPT

## 2024-07-11 PROCEDURE — 97530 THERAPEUTIC ACTIVITIES: CPT

## 2024-07-11 PROCEDURE — 36415 COLL VENOUS BLD VENIPUNCTURE: CPT

## 2024-07-11 PROCEDURE — 80048 BASIC METABOLIC PNL TOTAL CA: CPT

## 2024-07-11 PROCEDURE — 6370000000 HC RX 637 (ALT 250 FOR IP): Performed by: INTERNAL MEDICINE

## 2024-07-11 PROCEDURE — 93308 TTE F-UP OR LMTD: CPT | Performed by: INTERNAL MEDICINE

## 2024-07-11 PROCEDURE — 1200000000 HC SEMI PRIVATE

## 2024-07-11 PROCEDURE — 6370000000 HC RX 637 (ALT 250 FOR IP): Performed by: SURGERY

## 2024-07-11 PROCEDURE — 74177 CT ABD & PELVIS W/CONTRAST: CPT

## 2024-07-11 PROCEDURE — 6360000002 HC RX W HCPCS: Performed by: INTERNAL MEDICINE

## 2024-07-11 PROCEDURE — 83880 ASSAY OF NATRIURETIC PEPTIDE: CPT

## 2024-07-11 PROCEDURE — 99233 SBSQ HOSP IP/OBS HIGH 50: CPT | Performed by: INTERNAL MEDICINE

## 2024-07-11 PROCEDURE — 6360000004 HC RX CONTRAST MEDICATION: Performed by: RADIOLOGY

## 2024-07-11 PROCEDURE — 2580000003 HC RX 258: Performed by: SURGERY

## 2024-07-11 PROCEDURE — 93308 TTE F-UP OR LMTD: CPT

## 2024-07-11 PROCEDURE — 85025 COMPLETE CBC W/AUTO DIFF WBC: CPT

## 2024-07-11 PROCEDURE — 6370000000 HC RX 637 (ALT 250 FOR IP): Performed by: FAMILY MEDICINE

## 2024-07-11 RX ORDER — FUROSEMIDE 10 MG/ML
20 INJECTION INTRAMUSCULAR; INTRAVENOUS ONCE
Status: COMPLETED | OUTPATIENT
Start: 2024-07-11 | End: 2024-07-11

## 2024-07-11 RX ADMIN — FUROSEMIDE 20 MG: 10 INJECTION, SOLUTION INTRAMUSCULAR; INTRAVENOUS at 13:35

## 2024-07-11 RX ADMIN — SODIUM CHLORIDE, PRESERVATIVE FREE 10 ML: 5 INJECTION INTRAVENOUS at 21:22

## 2024-07-11 RX ADMIN — METRONIDAZOLE 500 MG: 500 TABLET ORAL at 21:30

## 2024-07-11 RX ADMIN — ACETAMINOPHEN 650 MG: 325 TABLET ORAL at 17:00

## 2024-07-11 RX ADMIN — IOPAMIDOL 93 ML: 755 INJECTION, SOLUTION INTRAVENOUS at 11:36

## 2024-07-11 RX ADMIN — SODIUM CHLORIDE, PRESERVATIVE FREE 10 ML: 5 INJECTION INTRAVENOUS at 10:04

## 2024-07-11 RX ADMIN — ACETAMINOPHEN 650 MG: 325 TABLET ORAL at 05:16

## 2024-07-11 RX ADMIN — ACETAMINOPHEN 650 MG: 325 TABLET ORAL at 21:19

## 2024-07-11 RX ADMIN — WATER 1000 MG: 1 INJECTION INTRAMUSCULAR; INTRAVENOUS; SUBCUTANEOUS at 10:04

## 2024-07-11 RX ADMIN — METHOCARBAMOL 500 MG: 500 TABLET ORAL at 21:17

## 2024-07-11 RX ADMIN — METRONIDAZOLE 500 MG: 500 TABLET ORAL at 05:16

## 2024-07-11 ASSESSMENT — PAIN DESCRIPTION - DESCRIPTORS
DESCRIPTORS: ACHING;SORE
DESCRIPTORS: PINS AND NEEDLES
DESCRIPTORS: ACHING

## 2024-07-11 ASSESSMENT — PAIN SCALES - GENERAL
PAINLEVEL_OUTOF10: 8
PAINLEVEL_OUTOF10: 0
PAINLEVEL_OUTOF10: 3
PAINLEVEL_OUTOF10: 6
PAINLEVEL_OUTOF10: 6

## 2024-07-11 ASSESSMENT — PAIN DESCRIPTION - ORIENTATION: ORIENTATION: MID

## 2024-07-11 ASSESSMENT — PAIN DESCRIPTION - LOCATION
LOCATION: ABDOMEN

## 2024-07-11 ASSESSMENT — PAIN SCALES - WONG BAKER: WONGBAKER_NUMERICALRESPONSE: NO HURT

## 2024-07-11 NOTE — PROGRESS NOTES
Comprehensive Nutrition Assessment    Type and Reason for Visit:  Reassess    Nutrition Recommendations/Plan:   Continue Current Diet  Encourage PO Intake  Consult RD as needed      Malnutrition Assessment:  Malnutrition Status:  Severe malnutrition (07/05/24 1625)    Context:  Social/Environmental Circumstances     Findings of the 6 clinical characteristics of malnutrition:  Energy Intake:  50% or less estimated energy requirements for 1 month or longer  Weight Loss:  Mild weight loss (specify amount and time period) (-3.5% in six months)     Body Fat Loss:  Severe body fat loss Orbital, Triceps   Muscle Mass Loss:  Mild muscle mass loss Clavicles (pectoralis & deltoids), Temples (temporalis)  Fluid Accumulation:  No significant fluid accumulation     Strength:  Not Performed    Nutrition Assessment:    Pt admit w/ Perforated diverticulitis w/ sepsis, colonic mass, pneumoperitoneum, UTI. Pt POD# 4 Exploratory laparotomy, sigmoid colon resection with end descending colostomy, takedown of colovesical fistula with repair of bladder, mobilization of splenic flexure, mobilization and placement of omental flap and open drainage of intraabdominal abscess. PMHx: HTN, HLD. Pt on regular diet, eating 1-25% of all meals. Pt meets criteria for severe malnutrition based soley on BMI. Pt states she has always been thin and eats healthy. Pt's significant other states she eats \"like a bird\" and needs to eat more. Possible disordered eating. ONS ordered TID per MD, will begin magic cup BID, continue inpatient monitoring, f/up per policy. 07/11/24: F/UP: POD # 10 Exploratory laparotomy, sigmoid colon resection with end descending colostomy, takedown of colovesical fistula with repair of bladder, mobilization of splenic flexure, mobilization and placement of omental flap and open drainage of intraabdominal abscess. Pt has not had stool output. CT scan for today to assess bowel function. Pt reports poor appetite and 1-25% of all  Care: Continue to monitor while inpatient     Goals:   Goals: Meet at least 75% of estimated needs, by next RD assessment       Nutrition Monitoring and Evaluation:   Behavioral-Environmental Outcomes: None Identified  Food/Nutrient Intake Outcomes: Food and Nutrient Intake  Physical Signs/Symptoms Outcomes: Biochemical Data, GI Status, Nausea or Vomiting, Fluid Status or Edema, Nutrition Focused Physical Findings, Skin, Weight, Diarrhea, Constipation    Discharge Planning:    Too soon to determine     Divine Guevara RD, LD  Contact: [phone]

## 2024-07-11 NOTE — PROGRESS NOTES
Surgery Progress Note            Chief complaint:   Chief Complaint   Patient presents with    Dysuria     With urinary urgency, reports she has been seen at  for UTI and given an antibiotic, symptoms not improving       Patient Active Problem List   Diagnosis    Primary hypertension    Mixed hyperlipidemia    Vitamin D deficiency    Panlobular emphysema (HCC)    Perforated viscus    Pneumoperitoneum    Acute hyponatremia    Peritonitis (HCC)    Hypokalemia    Hyponatremia    Elevated platelet count    Anemia associated with acute blood loss       S: improving daily    O:   Vitals:    07/11/24 0521   BP: (!) 151/75   Pulse: 75   Resp: 16   Temp: 97.6 °F (36.4 °C)   SpO2: 99%       Intake/Output Summary (Last 24 hours) at 7/11/2024 0724  Last data filed at 7/11/2024 0349  Gross per 24 hour   Intake 480 ml   Output 2450 ml   Net -1970 ml             Labs:  Lab Results   Component Value Date/Time    WBC 8.7 07/10/2024 03:55 AM    WBC 13.5 07/07/2024 09:44 AM    WBC 12.8 07/06/2024 04:45 AM    HGB 9.8 07/10/2024 03:55 AM    HGB 10.5 07/07/2024 09:44 AM    HGB 10.8 07/06/2024 04:45 AM    HCT 29.4 07/10/2024 03:55 AM    HCT 31.3 07/07/2024 09:44 AM    HCT 32.5 07/06/2024 04:45 AM     Lab Results   Component Value Date    CREATININE 0.5 07/10/2024    BUN 9 07/10/2024     (L) 07/10/2024    K 4.0 07/10/2024    CL 91 (L) 07/10/2024    CO2 25 07/10/2024     No results found for: \"LIPASE\", \"AMYLASE\"      Physical exam:   BP (!) 151/75   Pulse 75   Temp 97.6 °F (36.4 °C) (Oral)   Resp 16   Ht 1.676 m (5' 5.98\")   Wt 49.9 kg (110 lb)   SpO2 99%   BMI 17.76 kg/m²   General appearance: NAD  Head: NCAT  Neck: supple, no masses  Lungs: equal chest rise bilateral  Heart: S1S2 present  Abdomen: soft, mildly tender, nondistended,  Incisions clean dry and bandaged, ostomy healthy and with air output no stool  Skin; no lesions  Gu: no cva tenderness  Extremities: extremities normal, atraumatic, no cyanosis or edema    A:   POD # 10 Exploratory laparotomy, sigmoid colon resection with end descending colostomy, takedown of colovesical fistula with repair of bladder, mobilization of splenic flexure, mobilization and placement of omental flap and open drainage of intraabdominal abscess.     P: ct scan today to assess    Abdi Day MD  7/11/2024

## 2024-07-11 NOTE — PROGRESS NOTES
[facility] Hospitalist Progress Note    Admitting Date and Time: 6/30/2024  4:41 PM  Admit Dx: Pneumoperitoneum [K66.8]  Diverticulitis of colon [K57.32]  Perforated viscus [R19.8]  Colonic mass [K63.89]    Subjective:  Patient is being followed for Pneumoperitoneum [K66.8]  Diverticulitis of colon [K57.32]  Perforated viscus [R19.8]  Colonic mass [K63.89]   Patient seen and examined.  No new complaints or concerns.    ROS: denies fever, chills, cp, sob, n/v, HA unless stated above.      furosemide  20 mg IntraVENous Once    metroNIDAZOLE  500 mg Oral 3 times per day    methocarbamol  500 mg Oral TID    vitamin D  50,000 Units Oral Weekly    calcium elemental  500 mg Oral Daily    aspirin  81 mg Oral Daily    metoprolol succinate  50 mg Oral Daily    sodium chloride flush  5-40 mL IntraVENous 2 times per day    enoxaparin  40 mg SubCUTAneous Daily    cefTRIAXone (ROCEPHIN) IV  1,000 mg IntraVENous Daily    nicotine  1 patch TransDERmal Daily     traMADol, 25 mg, Q4H PRN   Or  traMADol, 50 mg, Q4H PRN  acetaminophen, 650 mg, Q4H PRN  sodium chloride flush, 5-40 mL, PRN  sodium chloride, , PRN  potassium chloride, 40 mEq, PRN   Or  potassium alternative oral replacement, 40 mEq, PRN   Or  potassium chloride, 10 mEq, PRN  magnesium sulfate, 2,000 mg, PRN  ondansetron, 4 mg, Q8H PRN   Or  ondansetron, 4 mg, Q6H PRN  ipratropium 0.5 mg-albuterol 2.5 mg, 1 Dose, Q4H PRN  diphenhydrAMINE, 25 mg, Q6H PRN         Objective:    BP (!) 151/75   Pulse 75   Temp 97.6 °F (36.4 °C) (Oral)   Resp 16   Ht 1.676 m (5' 5.98\")   Wt 49.9 kg (110 lb)   SpO2 99%   BMI 17.76 kg/m²     General Appearance: alert and oriented to person, place and time and in no acute distress  Skin: warm and dry  Head: normocephalic and atraumatic  Eyes: pupils equal, round, and reactive to light, extraocular eye movements intact, conjunctivae normal  Neck: neck supple and non tender without mass   Pulmonary/Chest: clear to auscultation  bilaterally- no wheezes, rales or rhonchi, normal air movement, no respiratory distress  Cardiovascular: normal rate, normal S1 and S2 and no carotid bruits  Abdomen: soft, non-tender, colostomy  Extremities: no cyanosis, no clubbing and no edema  Neurologic: no cranial nerve deficit and speech normal        Recent Labs     07/10/24  0355 07/10/24  1926 07/11/24  1014    128* 130*   K 4.0 4.0 4.1   CL 94* 91* 93*   CO2 29 25 24   BUN 7 9 9   CREATININE 0.5 0.5 0.5   GLUCOSE 116* 118* 127*   CALCIUM 9.2 9.2 9.2       Recent Labs     07/10/24  0355 07/11/24  1014   WBC 8.7 10.4   RBC 3.27* 3.59   HGB 9.8* 10.6*   HCT 29.4* 32.4*   MCV 89.9 90.3   MCH 30.0 29.5   MCHC 33.3 32.7   RDW 12.9 13.0   * 541*   MPV 9.2 9.1       Labs and imaging reviewed    Assessment:    Principal Problem:    Perforated viscus  Active Problems:    Primary hypertension    Mixed hyperlipidemia    Panlobular emphysema (HCC)    Pneumoperitoneum    Acute hyponatremia    Peritonitis (HCC)    Hypokalemia    Hyponatremia    Elevated platelet count    Anemia associated with acute blood loss  Resolved Problems:    * No resolved hospital problems. *      Plan:  Status post exploratory laparotomy, sigmoid colon resection with end descending colostomy, takedown of colovesicular fistula with repair bladder, mobilization of splenic flexure, mobilization and placement of omental flap and open drainage of intra-abdominal abscess.  Continue antibiotics.  General surgery managing, repeat CT A/P today.  Hypotonic hyponatremia, possible SIADH versus poor oral intake, nephrology following appreciate recommendations,  Elevated proBNP, Lasix given today, possible CHF?,  Obtain echo to evaluate LV function.  Hypertension, continue metoprolol, monitor  Anemia, continue to monitor and transfuse if hemoglobin less than 7    Discharge planning, awaiting ostomy output, for CT A/P today, plan for home with home health care on discharge.      CODE STATUS: Full

## 2024-07-11 NOTE — PROGRESS NOTES
ET Nurse  Admit Date: 6/30/2024  4:41 PM    Reason for consult:  new colostomy    Significant history:  History reviewed. No pertinent past medical history.    Stoma assessment:  stoma good red color no stool    Plan:    Reviewed ostomy care  Pt has not had stool  Inst on emptying  Discussed diet    Gave information re [facility]    Pt will have homecare    Reviewed information re wound care  Discussed signs and sym of infection    Chiara Ivy RN 7/11/2024 2:21 PM

## 2024-07-11 NOTE — PROGRESS NOTES
Department of Internal Medicine  Nephrology Consult Note      Events reviewed.    SUBJECTIVE: We are following Mrs. Weems for hyponatremia.  Reports no new complaints.    PHYSICAL EXAM:      Vitals:    VITALS:  BP (!) 151/75   Pulse 75   Temp 97.6 °F (36.4 °C) (Oral)   Resp 16   Ht 1.676 m (5' 5.98\")   Wt 49.9 kg (110 lb)   SpO2 99%   BMI 17.76 kg/m²   24HR INTAKE/OUTPUT:    Intake/Output Summary (Last 24 hours) at 7/11/2024 1311  Last data filed at 7/11/2024 0800  Gross per 24 hour   Intake 600 ml   Output 1750 ml   Net -1150 ml         Constitutional:  Awake, alert, oriented, in NAD  HEENT:  PERRLA, normocephalic, atraumatic  Respiratory:  CTA  Cardiovascular/Edema:  RRR, normal S1, normal S2  Gastrointestinal:  Soft, flat, non-distended, non-tender  Neurologic:  Nonfocal  Skin:  warm, dry, no rashes, no lesions  Other: + Edema    Scheduled Meds:   metroNIDAZOLE  500 mg Oral 3 times per day    methocarbamol  500 mg Oral TID    vitamin D  50,000 Units Oral Weekly    calcium elemental  500 mg Oral Daily    aspirin  81 mg Oral Daily    metoprolol succinate  50 mg Oral Daily    sodium chloride flush  5-40 mL IntraVENous 2 times per day    enoxaparin  40 mg SubCUTAneous Daily    cefTRIAXone (ROCEPHIN) IV  1,000 mg IntraVENous Daily    nicotine  1 patch TransDERmal Daily     Continuous Infusions:   sodium chloride       PRN Meds:.traMADol **OR** traMADol, acetaminophen, sodium chloride flush, sodium chloride, potassium chloride **OR** potassium alternative oral replacement **OR** potassium chloride, magnesium sulfate, ondansetron **OR** ondansetron, ipratropium 0.5 mg-albuterol 2.5 mg, diphenhydrAMINE    DATA:    CBC:   Lab Results   Component Value Date/Time    WBC 10.4 07/11/2024 10:14 AM    RBC 3.59 07/11/2024 10:14 AM    HGB 10.6 07/11/2024 10:14 AM    HCT 32.4 07/11/2024 10:14 AM    MCV 90.3 07/11/2024 10:14 AM    MCH 29.5 07/11/2024 10:14 AM    MCHC 32.7 07/11/2024 10:14 AM    RDW 13.0 07/11/2024 10:14 AM      07/11/2024 10:14 AM    MPV 9.1 07/11/2024 10:14 AM     CMP:    Lab Results   Component Value Date/Time     07/11/2024 10:14 AM    K 4.1 07/11/2024 10:14 AM    CL 93 07/11/2024 10:14 AM    CO2 24 07/11/2024 10:14 AM    BUN 9 07/11/2024 10:14 AM    CREATININE 0.5 07/11/2024 10:14 AM    LABGLOM >90 07/11/2024 10:14 AM    LABGLOM >60 10/26/2023 09:44 AM    GLUCOSE 127 07/11/2024 10:14 AM    CALCIUM 9.2 07/11/2024 10:14 AM    BILITOT 0.3 07/10/2024 03:55 AM    ALKPHOS 67 07/10/2024 03:55 AM    AST 31 07/10/2024 03:55 AM    ALT 15 07/10/2024 03:55 AM     Magnesium:    Lab Results   Component Value Date/Time    MG 1.8 07/09/2024 04:17 AM     Phosphorus:    Lab Results   Component Value Date/Time    PHOS 2.9 07/09/2024 04:17 AM     Radiology Review:      CT ABDOMEN PELVIS W IV CONTRAST 7/11/24  IMPRESSION:  1. Status post left mid abdominal colostomy. The proximal colon is  prominently stool filled, indicating CONSTIPATION.  No gross bowel wall  thickening or evidence of mechanical obstruction.  2. Small, 5.1 x 2.4 cm, fluid collection in the left presacral region, with  subtle rim enhancement.  DEVELOPING ABSCESS CANNOT BE EXCLUDED.  Tiny locules  of gas within it may be due to infection or recent surgery.  3. Mild thickening of the urinary bladder wall may be an artifact of under  distension. Cystitis cannot be excluded.  4. 7 mm nodular opacity in the right middle lobe.  It may be infectious,  inflammatory or neoplastic but has developed in the interim since CT of the  chest from 10/26/2023.  FOLLOW-UP CT OF THE CHEST IS RECOMMENDED in 6-12  months.  5. Multiple cavernous malformations in the right lobe of the liver.  The  remainder of the smaller hypodensities may represent cyst or hemangioma as  well. Follow-up may be obtained, if the patient has history of cancer.  6. Stable 2.3 cm right pelvic cyst.  Further evaluation with pelvic  sonography recommended.  7. Fibroid uterus.      BRIEF SUMMARY OF

## 2024-07-11 NOTE — CARE COORDINATION
CM note:  Still awaiting ostomy output, pt for CT scan today.  Pt ambulatory. Home care has been arranged with [facility], will need orders with specific wound care orders. [facility] also requests that hospital send a few days of supplies home with patient.

## 2024-07-11 NOTE — PROGRESS NOTES
OCCUPATIONAL THERAPY TREATMENT NOTE    [facility]   [address] [facility]        Date:2024  Patient Name: Cherrie Weems  MRN: [id number]  : 1960  Room: [address] [place] [address]      Evaluating OT: Kelsey Marin, OTR/L; [id number]        Referring Provider and Orders/Date:        OT eval and treat  Start:  24, End:  24,   ONE TIME,   Standing Count:  1 Occurrences,   R       Abdi Day MD     Recommended placement: home with home health        Diagnosis:   1. Pneumoperitoneum    2. Colonic mass    3. Diverticulitis of colon    4. Diverticulitis          Surgery: 24:Exploratory laparotomy, sigmoid colon resection with end descending colostomy, takedown of colovesical fistula with repair of bladder, mobilization of splenic flexure, mobilization and placement of omental flap and open drainage of intraabdominal abscess.       Pertinent Medical History:        Past Medical History   History reviewed. No pertinent past medical history.          Past Surgical History         Past Surgical History:   Procedure Laterality Date    SMALL INTESTINE SURGERY N/A 2024     EXPLORATORY LAPAROTOMY SIGMOID COLON RESECTION POSS COLOSTOMY performed by Adbi Day MD at [facility] OR           Precautions:  Fall Risk, abdominal precautions,TAMY drain, ostomy/guevara        Assessment of current deficits     [x] Functional mobility           [x]ADLs           [x] Strength                   []Cognition     [x] Functional transfers          [x] IADLs          [x] Safety Awareness   [x]Endurance     [] Fine Coordination                         [x] Balance      [] Vision/perception    []Sensation       [x]Gross Motor Coordination             [] ROM           [] Delirium                   [] Motor Control      OT PLAN OF CARE   OT POC based on physician orders, patient diagnosis and results of clinical assessment     Frequency/Duration 1-3 days/wk for 2  fair+    Dynamic:fair+  Standing: fair- Sitting:  Static: independent   Dynamic: independent   Standing: supervision  Sitting:     Static:  good    Dynamic:good  Standing: fair+   Activity Tolerance Vitals with activity: room air 95%   Standing tolerance 2min  fair+  Increase standing tolerance for >4min with stable vital signs for carry over into toileting, functional tranfers and indep in ADLs   Visual/  Perceptual Glasses: Present; WFL     Reports change in vision since admission: No      NA       Comments: Upon arrival pt supine in bed, agreeable to therapy session. Pt educated with regards to bed mobility, functional transfers, functional mobility, hand placement, safety awareness, LE dressing AE, toileting/hygiene. At end of session pt seated in chair,  all lines and tubes intact, call light within reach.     Pt has made fair  progress towards set goals.   Continue with current plan of care      Treatment Time In: 0948            Treatment Time Out: 1020                Treatment Charges: Mins Units   Ther Ex  [id number]     Manual Therapy 96732     Thera Activities 78548 15 1   ADL/Home Mgt 18625 17 1   Neuro Re-ed 56595     Group Therapy      Orthotic manage/training  12698     Non-Billable Time     Total Timed Treatment 32 2       Treatment Time additionally includes review of current medical information, gathering information on past medical history/social history and prior level of function, interpretation of standardized testing/informal observation of tasks, treatment for plan of care and goals.     Dominic SYED/POOJA [id number]

## 2024-07-12 LAB
ANION GAP SERPL CALCULATED.3IONS-SCNC: 12 MMOL/L (ref 7–16)
BASOPHILS # BLD: 0.03 K/UL (ref 0–0.2)
BASOPHILS NFR BLD: 0 % (ref 0–2)
BUN SERPL-MCNC: 8 MG/DL (ref 6–23)
CALCIUM SERPL-MCNC: 8.9 MG/DL (ref 8.6–10.2)
CHLORIDE SERPL-SCNC: 94 MMOL/L (ref 98–107)
CO2 SERPL-SCNC: 26 MMOL/L (ref 22–29)
CREAT SERPL-MCNC: 0.6 MG/DL (ref 0.5–1)
EOSINOPHIL # BLD: 0.3 K/UL (ref 0.05–0.5)
EOSINOPHILS RELATIVE PERCENT: 4 % (ref 0–6)
ERYTHROCYTE [DISTWIDTH] IN BLOOD BY AUTOMATED COUNT: 13.4 % (ref 11.5–15)
GFR, ESTIMATED: >90 ML/MIN/1.73M2
GLUCOSE SERPL-MCNC: 104 MG/DL (ref 74–99)
HCT VFR BLD AUTO: 29.4 % (ref 34–48)
HGB BLD-MCNC: 9.8 G/DL (ref 11.5–15.5)
IMM GRANULOCYTES # BLD AUTO: 0.1 K/UL (ref 0–0.58)
IMM GRANULOCYTES NFR BLD: 1 % (ref 0–5)
LYMPHOCYTES NFR BLD: 0.95 K/UL (ref 1.5–4)
LYMPHOCYTES RELATIVE PERCENT: 12 % (ref 20–42)
MCH RBC QN AUTO: 30.6 PG (ref 26–35)
MCHC RBC AUTO-ENTMCNC: 33.3 G/DL (ref 32–34.5)
MCV RBC AUTO: 91.9 FL (ref 80–99.9)
MONOCYTES NFR BLD: 0.69 K/UL (ref 0.1–0.95)
MONOCYTES NFR BLD: 8 % (ref 2–12)
NEUTROPHILS NFR BLD: 75 % (ref 43–80)
NEUTS SEG NFR BLD: 6.19 K/UL (ref 1.8–7.3)
PLATELET # BLD AUTO: 508 K/UL (ref 130–450)
PMV BLD AUTO: 9.2 FL (ref 7–12)
POTASSIUM SERPL-SCNC: 3.7 MMOL/L (ref 3.5–5)
RBC # BLD AUTO: 3.2 M/UL (ref 3.5–5.5)
SODIUM SERPL-SCNC: 132 MMOL/L (ref 132–146)
WBC OTHER # BLD: 8.3 K/UL (ref 4.5–11.5)

## 2024-07-12 PROCEDURE — 97530 THERAPEUTIC ACTIVITIES: CPT

## 2024-07-12 PROCEDURE — 6370000000 HC RX 637 (ALT 250 FOR IP): Performed by: SURGERY

## 2024-07-12 PROCEDURE — 2580000003 HC RX 258: Performed by: SURGERY

## 2024-07-12 PROCEDURE — 80048 BASIC METABOLIC PNL TOTAL CA: CPT

## 2024-07-12 PROCEDURE — 6360000002 HC RX W HCPCS: Performed by: SURGERY

## 2024-07-12 PROCEDURE — 6370000000 HC RX 637 (ALT 250 FOR IP): Performed by: INTERNAL MEDICINE

## 2024-07-12 PROCEDURE — 99024 POSTOP FOLLOW-UP VISIT: CPT | Performed by: SURGERY

## 2024-07-12 PROCEDURE — 36415 COLL VENOUS BLD VENIPUNCTURE: CPT

## 2024-07-12 PROCEDURE — 99232 SBSQ HOSP IP/OBS MODERATE 35: CPT | Performed by: INTERNAL MEDICINE

## 2024-07-12 PROCEDURE — 85025 COMPLETE CBC W/AUTO DIFF WBC: CPT

## 2024-07-12 PROCEDURE — 1200000000 HC SEMI PRIVATE

## 2024-07-12 PROCEDURE — 6370000000 HC RX 637 (ALT 250 FOR IP): Performed by: FAMILY MEDICINE

## 2024-07-12 RX ORDER — BISACODYL 5 MG/1
10 TABLET, DELAYED RELEASE ORAL EVERY 4 HOURS
Status: COMPLETED | OUTPATIENT
Start: 2024-07-12 | End: 2024-07-12

## 2024-07-12 RX ORDER — ENOXAPARIN SODIUM 100 MG/ML
30 INJECTION SUBCUTANEOUS DAILY
Status: DISCONTINUED | OUTPATIENT
Start: 2024-07-13 | End: 2024-07-13 | Stop reason: HOSPADM

## 2024-07-12 RX ORDER — FUROSEMIDE 10 MG/ML
20 INJECTION INTRAMUSCULAR; INTRAVENOUS ONCE
Status: DISCONTINUED | OUTPATIENT
Start: 2024-07-12 | End: 2024-07-12

## 2024-07-12 RX ADMIN — SODIUM CHLORIDE, PRESERVATIVE FREE 10 ML: 5 INJECTION INTRAVENOUS at 09:04

## 2024-07-12 RX ADMIN — WATER 1000 MG: 1 INJECTION INTRAMUSCULAR; INTRAVENOUS; SUBCUTANEOUS at 09:05

## 2024-07-12 RX ADMIN — METRONIDAZOLE 500 MG: 500 TABLET ORAL at 22:47

## 2024-07-12 RX ADMIN — ERGOCALCIFEROL 50000 UNITS: 1.25 CAPSULE ORAL at 09:03

## 2024-07-12 RX ADMIN — SODIUM CHLORIDE, PRESERVATIVE FREE 10 ML: 5 INJECTION INTRAVENOUS at 22:42

## 2024-07-12 RX ADMIN — METOPROLOL SUCCINATE 50 MG: 50 TABLET, EXTENDED RELEASE ORAL at 09:03

## 2024-07-12 RX ADMIN — METHOCARBAMOL 500 MG: 500 TABLET ORAL at 22:41

## 2024-07-12 RX ADMIN — METHOCARBAMOL 500 MG: 500 TABLET ORAL at 09:03

## 2024-07-12 RX ADMIN — ASPIRIN 81 MG: 81 TABLET, COATED ORAL at 09:03

## 2024-07-12 RX ADMIN — ACETAMINOPHEN 650 MG: 325 TABLET ORAL at 22:41

## 2024-07-12 RX ADMIN — METRONIDAZOLE 500 MG: 500 TABLET ORAL at 05:54

## 2024-07-12 RX ADMIN — ENOXAPARIN SODIUM 40 MG: 100 INJECTION SUBCUTANEOUS at 09:06

## 2024-07-12 RX ADMIN — METHOCARBAMOL 500 MG: 500 TABLET ORAL at 14:17

## 2024-07-12 RX ADMIN — ACETAMINOPHEN 650 MG: 325 TABLET ORAL at 07:24

## 2024-07-12 RX ADMIN — ACETAMINOPHEN 650 MG: 325 TABLET ORAL at 03:00

## 2024-07-12 RX ADMIN — CALCIUM 500 MG: 500 TABLET ORAL at 09:03

## 2024-07-12 RX ADMIN — BISACODYL 10 MG: 5 TABLET, COATED ORAL at 09:03

## 2024-07-12 RX ADMIN — ACETAMINOPHEN 650 MG: 325 TABLET ORAL at 16:44

## 2024-07-12 RX ADMIN — ACETAMINOPHEN 650 MG: 325 TABLET ORAL at 12:05

## 2024-07-12 RX ADMIN — BISACODYL 10 MG: 5 TABLET, COATED ORAL at 12:05

## 2024-07-12 RX ADMIN — METRONIDAZOLE 500 MG: 500 TABLET ORAL at 14:17

## 2024-07-12 ASSESSMENT — PAIN DESCRIPTION - DESCRIPTORS
DESCRIPTORS: PINS AND NEEDLES
DESCRIPTORS: DISCOMFORT;SQUEEZING
DESCRIPTORS: ACHING
DESCRIPTORS: TENDER;SORE

## 2024-07-12 ASSESSMENT — PAIN SCALES - GENERAL
PAINLEVEL_OUTOF10: 6
PAINLEVEL_OUTOF10: 8
PAINLEVEL_OUTOF10: 3
PAINLEVEL_OUTOF10: 7
PAINLEVEL_OUTOF10: 0
PAINLEVEL_OUTOF10: 5
PAINLEVEL_OUTOF10: 6

## 2024-07-12 ASSESSMENT — PAIN DESCRIPTION - ORIENTATION
ORIENTATION: MID
ORIENTATION: RIGHT
ORIENTATION: MID
ORIENTATION: MID

## 2024-07-12 ASSESSMENT — PAIN SCALES - WONG BAKER: WONGBAKER_NUMERICALRESPONSE: NO HURT

## 2024-07-12 ASSESSMENT — PAIN DESCRIPTION - LOCATION
LOCATION: ABDOMEN

## 2024-07-12 ASSESSMENT — PAIN - FUNCTIONAL ASSESSMENT: PAIN_FUNCTIONAL_ASSESSMENT: ACTIVITIES ARE NOT PREVENTED

## 2024-07-12 NOTE — PROGRESS NOTES
This patient is on DVT Prophylaxis medication that requires a dose adjustment      Date Body Weight IBW  Adjusted BW SCr  CrCl Dialysis status   7/12/2024 49.9 kg (110 lb) Ideal body weight: 59.3 kg (130 lb 11.7 oz) Serum creatinine: 0.6 mg/dL 07/12/24 0624  Estimated creatinine clearance: 74 mL/min N/a       Pharmacy has dose-adjusted the DVT Prophylaxis regimen to match   the recommendations from the following table        Ordered Medication:Lovenox 40mg daily    Order Changed/converted to: Lovenox 30mg daily    These changes were made per protocol according to the [facility] Pharmacist   Review for Appropriate Use and Automatic Dose Adjustments of   Subcutaneous Anticoagulants Policy     *Please note this dose may need readjusted if patient's condition changes.    Please contact pharmacy with any questions regarding these changes.    Marina Jara, PharmD.  7/12/2024 12:44 PM    TANVI: [phone]

## 2024-07-12 NOTE — PROGRESS NOTES
to sit: Minimal assist of 1    Sit to supine: Minimal assist of 1    Scooting: Minimal assist of 1   Using rails and head of bed elevated:     Rolling: Not assessed    Supine to sit: Not assessed    Sit to supine: Not assessed patient seated edge of bed   Scooting: Not assessed     Rolling: Independent    Supine to sit: Independent    Sit to supine: Independent    Scooting: Independent     Transfers Sit to stand: Minimal assist of 1  Sit to stand: Not assessed   Sit to stand: Independent    Ambulation     2 x 75 feet using  hand held assist with Moderate assist of 1   for balance and safety 180 feet and 250 feet using  no device with Supervision    cues for safety and pacing     > 200 feet using  least restrictive device versus no device with Independent    Stair negotiation: ascended and descended   Not assessed  Pt declined 10 steps with bilateral HR supervision     4 steps, bilateral rail with Supervision   ROM Within functional limits    Increase range of motion 10% of affected joints    Strength BUE:  refer to OT eval  RLE:  4-/5  LLE:   4-/5  Increase strength in affected mm groups by 1/3 grade   Balance Sitting EOB:  fair+  Dynamic Standing:  fair with HHA Sitting EOB: good   Dynamic Standing: good minus    Sitting EOB:  good-  Dynamic Standing: fair+ with HHA     Patient is Alert & Oriented x person, place, time, and situation and follows directions    Sensation:  Patient  denies numbness/tingling   Edema:  no   Endurance: fair      Vitals: room air   Blood Pressure at rest  Blood Pressure during session    Heart Rate at rest  Heart Rate during session    SPO2 at rest %  SPO2 during session %     Patient education  Patient educated on role of Physical Therapy, risks of immobility, safety and plan of care, energy conservation,  importance of mobility while in hospital , safety , stair training , and log roll for bed mobility      Patient response to education:   Pt verbalized understanding Pt demonstrated  skill Pt requires further education in this area   Yes Partial Yes      Treatment:  Patient practiced and was instructed/facilitated in the following treatment: patient found in hallway with wheeled walker. Therapist took wheeled walker to the side and patient ambulated with no device. Performed stair training. Assisted back to edge of bed. Performed exercise seated.            Therapeutic Exercises:  ankle pumps and long arc quad x 10 reps     At end of session, patient sitting edge of bed with     call light and phone within reach,  all lines and tubes intact, nursing notified.      Patient would benefit from continued skilled Physical Therapy to improve functional independence and quality of life.         Patient's/ family goals   home    Time in  1022a  Time out  1033a    Total Treatment Time  11 minutes    CPT codes:  Therapeutic activities (86546)   11 minutes  1 unit(s)    Selma Pal, [facility]   #[phone]

## 2024-07-12 NOTE — CARE COORDINATION
7/12/2024 1204p ()  NOTE: Pt is for a poss dc on this date. SW notified [facility]. Orders obtained for  for wound care changes. Family to provide transport home.    Electronically signed by NIKOLAS Rascon on 7/12/2024 at 12:06 PM

## 2024-07-12 NOTE — PROGRESS NOTES
[facility] Hospitalist Progress Note    Admitting Date and Time: 6/30/2024  4:41 PM  Admit Dx: Pneumoperitoneum [K66.8]  Diverticulitis of colon [K57.32]  Perforated viscus [R19.8]  Colonic mass [K63.89]    Subjective:  Patient is being followed for Pneumoperitoneum [K66.8]  Diverticulitis of colon [K57.32]  Perforated viscus [R19.8]  Colonic mass [K63.89]   Patient seen and examined.  No new complaints or concerns.  Patient walking in the hallway.  Patient really wants to go home today.  Having output from the ostomy    ROS: denies fever, chills, cp, sob, n/v, HA unless stated above.      bisacodyl  10 mg Oral Q4H    metroNIDAZOLE  500 mg Oral 3 times per day    methocarbamol  500 mg Oral TID    vitamin D  50,000 Units Oral Weekly    calcium elemental  500 mg Oral Daily    aspirin  81 mg Oral Daily    metoprolol succinate  50 mg Oral Daily    sodium chloride flush  5-40 mL IntraVENous 2 times per day    enoxaparin  40 mg SubCUTAneous Daily    cefTRIAXone (ROCEPHIN) IV  1,000 mg IntraVENous Daily    nicotine  1 patch TransDERmal Daily     traMADol, 25 mg, Q4H PRN   Or  traMADol, 50 mg, Q4H PRN  acetaminophen, 650 mg, Q4H PRN  sodium chloride flush, 5-40 mL, PRN  sodium chloride, , PRN  potassium chloride, 40 mEq, PRN   Or  potassium alternative oral replacement, 40 mEq, PRN   Or  potassium chloride, 10 mEq, PRN  magnesium sulfate, 2,000 mg, PRN  ondansetron, 4 mg, Q8H PRN   Or  ondansetron, 4 mg, Q6H PRN  ipratropium 0.5 mg-albuterol 2.5 mg, 1 Dose, Q4H PRN  diphenhydrAMINE, 25 mg, Q6H PRN         Objective:    /66   Pulse 89   Temp 97.6 °F (36.4 °C) (Oral)   Resp 18   Ht 1.676 m (5' 6\")   Wt 49.9 kg (110 lb)   SpO2 99%   BMI 17.75 kg/m²     General Appearance: alert and oriented to person, place and time and in no acute distress  Skin: warm and dry  Head: normocephalic and atraumatic  Eyes: pupils equal, round, and reactive to light, extraocular eye movements intact, conjunctivae normal  Neck:

## 2024-07-12 NOTE — PROGRESS NOTES
SUMMARY OF INITIAL CONSULT:    Briefly Mrs. Weems is a 64-year-old female with history of HTN, hyperlipidemia, emphysema, vitamin D deficiency, who was admitted on June 30, 2024 after she presented to the ER with abdominal pain, she was found to have diverticulitis with a perforated sigmoid colon with erosion into the bladder and intra-abdominal abscess, she underwent urgent exploratory laparotomy with sigmoid colon resection with end descending colostomy and taken down of colovesical fistula with repair of bladder, on admission her sodium level was 134 mEq/L, and on July 2 was 137 mEq/L, since then there was no blood work obtained until July 7 when it was found to be low at 129 mEq/L, reason for this consultation.    Problems resolved:    Hypokalemia 2/2 GI losses,  To replace    IMPRESSION/RECOMMENDATIONS:      Hypotonic hyponatremia, probably SIADH versus poor solute intake, sodium 134, proBNP 1295, and urine osmolality 432, urine sodium 121; repeated urine osmolality 197, urine sodium 53, cortisol level 12.2.  Sodium levels improved, up to 132.      HTN, on metoprolol succinate  Elevated proBNP levels, heart failure?  Normocytic anemia, multifactorial, B12 >2000, folate renal sign 20, ferritin 760, iron saturation 22%.  --------------------------------------------------------  Perforated sigmoid colon with intra-abdominal abscesses status post sigmoid colon resection with end descending colostomy, on ceftriaxone, metronidazole  Colovesical fistula s/p repair of bladder     Plan:     Continue fluid restriction, 1 L  Continue to monitor sodium level  Okay to discharge from renal point of view      Electronically signed by Racheal Arellano MD on 7/12/2024 at 12:49 PM

## 2024-07-12 NOTE — DISCHARGE INSTRUCTIONS
surgery. This is common. If that's the case, try to avoid constipation and straining. Your doctor may suggest fiber, a stool softener, or a mild laxative.   Medicines    Your doctor will tell you if and when you can restart your medicines. You will also get instructions about taking any new medicines.     If you stopped taking aspirin or some other blood thinner, your doctor will tell you when to start taking it again.     Be safe with medicines. Read and follow all instructions on the label.  If you are not taking a prescription pain medicine, ask your doctor if you can take an over-the-counter medicine.  If the doctor gave you a prescription medicine for pain, take it as prescribed.  Store your prescription pain medicines where no one else can get to them. When you are done using them, dispose of them quickly and safely. Your local pharmacy or hospital may have a drop-off site.     If your doctor prescribed antibiotics, take them as directed. Do not stop taking them just because you feel better. You need to take the full course of antibiotics.   Incision care    You will have a dressing or a skin adhesive over the cut (incision). This helps the incision heal and protects it. Your doctor will tell you how to take care of it.     If you have strips of tape on the incision, leave the tape on until it falls off.     If you have staples or stitches that don't dissolve on their own, your doctor will tell you when to come back to have them removed.     You may cover the area with a gauze bandage if it oozes fluid or rubs against clothing.     You may shower 24 to 48 hours after surgery. Pat the incision dry. Don't swim or take a bath for the first 2 weeks, or until your doctor tells you it is okay.     Gently wash the area daily with warm, soapy water, rinse, and pat it dry. Don't use hydrogen peroxide or alcohol. They can slow healing. If you have a bandage when you leave the hospital, change it as often as instructed  What to Expect at Home.\"  Current as of: September 18, 2023  Content Version: 14.1  © 9975-3691 [employer].   Care instructions adapted under license by [employer]. If you have questions about a medical condition or this instruction, always ask your healthcare professional. [employer] disclaims any warranty or liability for your use of this information.

## 2024-07-12 NOTE — PLAN OF CARE
Problem: Discharge Planning  Goal: Discharge to home or other facility with appropriate resources  Outcome: Progressing     Problem: ABCDS Injury Assessment  Goal: Absence of physical injury  Outcome: Progressing     Problem: Pain  Goal: Verbalizes/displays adequate comfort level or baseline comfort level  Outcome: Progressing     Problem: Safety - Adult  Goal: Free from fall injury  Outcome: Progressing     Problem: Nutrition Deficit:  Goal: Optimize nutritional status  7/11/2024 2349 by Jeremy Sanchez RN  Outcome: Progressing  7/11/2024 1442 by Divine Guevara RD, LD  Outcome: Progressing  Flowsheets (Taken 7/11/2024 1432)  Nutrient intake appropriate for improving, restoring, or maintaining nutritional needs:   Monitor oral intake, labs, and treatment plans   Assess nutritional status and recommend course of action   Recommend appropriate diets, oral nutritional supplements, and vitamin/mineral supplements     Problem: Skin/Tissue Integrity  Goal: Absence of new skin breakdown  Description: 1.  Monitor for areas of redness and/or skin breakdown  2.  Assess vascular access sites hourly  3.  Every 4-6 hours minimum:  Change oxygen saturation probe site  4.  Every 4-6 hours:  If on nasal continuous positive airway pressure, respiratory therapy assess nares and determine need for appliance change or resting period.  Outcome: Progressing

## 2024-07-12 NOTE — PLAN OF CARE
Problem: Discharge Planning  Goal: Discharge to home or other facility with appropriate resources  7/12/2024 1123 by Breanna Kennedy RN  Outcome: Progressing  7/11/2024 2349 by Jeremy Sanchez RN  Outcome: Progressing     Problem: ABCDS Injury Assessment  Goal: Absence of physical injury  7/12/2024 1123 by Breanna Kennedy RN  Outcome: Progressing  7/11/2024 2349 by Jeremy Sanchez RN  Outcome: Progressing     Problem: Safety - Adult  Goal: Free from fall injury  7/12/2024 1123 by Breanna Kennedy RN  Outcome: Progressing  7/11/2024 2349 by Jeremy Sanchez RN  Outcome: Progressing     Problem: Nutrition Deficit:  Goal: Optimize nutritional status  7/12/2024 1123 by Breanna Kennedy RN  Outcome: Progressing  7/11/2024 2349 by Jeremy Sanchez RN  Outcome: Progressing

## 2024-07-12 NOTE — PROGRESS NOTES
Surgery Progress Note            Chief complaint:   Chief Complaint   Patient presents with    Dysuria     With urinary urgency, reports she has been seen at  for UTI and given an antibiotic, symptoms not improving       Patient Active Problem List   Diagnosis    Primary hypertension    Mixed hyperlipidemia    Vitamin D deficiency    Panlobular emphysema (HCC)    Perforated viscus    Pneumoperitoneum    Acute hyponatremia    Peritonitis (HCC)    Hypokalemia    Hyponatremia    Elevated platelet count    Anemia associated with acute blood loss       S: no bm yet    O:   Vitals:    07/12/24 0457   BP: 123/66   Pulse: 89   Resp: 18   Temp: 97.6 °F (36.4 °C)   SpO2: 100%       Intake/Output Summary (Last 24 hours) at 7/12/2024 0754  Last data filed at 7/11/2024 1746  Gross per 24 hour   Intake 600 ml   Output 650 ml   Net -50 ml             Labs:  Lab Results   Component Value Date/Time    WBC 10.4 07/11/2024 10:14 AM    WBC 8.7 07/10/2024 03:55 AM    WBC 13.5 07/07/2024 09:44 AM    HGB 10.6 07/11/2024 10:14 AM    HGB 9.8 07/10/2024 03:55 AM    HGB 10.5 07/07/2024 09:44 AM    HCT 32.4 07/11/2024 10:14 AM    HCT 29.4 07/10/2024 03:55 AM    HCT 31.3 07/07/2024 09:44 AM     Lab Results   Component Value Date    CREATININE 0.5 07/11/2024    BUN 9 07/11/2024     (L) 07/11/2024    K 4.1 07/11/2024    CL 93 (L) 07/11/2024    CO2 24 07/11/2024     No results found for: \"LIPASE\", \"AMYLASE\"      Physical exam:   /66   Pulse 89   Temp 97.6 °F (36.4 °C) (Oral)   Resp 18   Ht 1.676 m (5' 6\")   Wt 49.9 kg (110 lb)   SpO2 100%   BMI 17.75 kg/m²   General appearance: NAD  Head: NCAT  Neck: supple, no masses  Lungs: equal chest rise bilateral  Heart: S1S2 present  Abdomen: soft, mildly tender, nondistended,  Incisions clean dry and bandaged, ostomy healthy and with air output no stool  Skin; no lesions  Gu: no cva tenderness  Extremities: extremities normal, atraumatic, no cyanosis or edema    A:  POD # 11

## 2024-07-13 VITALS
BODY MASS INDEX: 17.68 KG/M2 | WEIGHT: 110 LBS | OXYGEN SATURATION: 99 % | SYSTOLIC BLOOD PRESSURE: 145 MMHG | HEART RATE: 89 BPM | RESPIRATION RATE: 14 BRPM | TEMPERATURE: 98.2 F | DIASTOLIC BLOOD PRESSURE: 87 MMHG | HEIGHT: 66 IN

## 2024-07-13 PROCEDURE — 6360000002 HC RX W HCPCS: Performed by: SURGERY

## 2024-07-13 PROCEDURE — 99231 SBSQ HOSP IP/OBS SF/LOW 25: CPT | Performed by: INTERNAL MEDICINE

## 2024-07-13 PROCEDURE — 6370000000 HC RX 637 (ALT 250 FOR IP): Performed by: SURGERY

## 2024-07-13 PROCEDURE — 6370000000 HC RX 637 (ALT 250 FOR IP): Performed by: INTERNAL MEDICINE

## 2024-07-13 PROCEDURE — 2580000003 HC RX 258: Performed by: SURGERY

## 2024-07-13 PROCEDURE — 6370000000 HC RX 637 (ALT 250 FOR IP): Performed by: FAMILY MEDICINE

## 2024-07-13 RX ORDER — OXYCODONE HYDROCHLORIDE AND ACETAMINOPHEN 5; 325 MG/1; MG/1
1 TABLET ORAL EVERY 6 HOURS PRN
Qty: 10 TABLET | Refills: 0 | Status: SHIPPED | OUTPATIENT
Start: 2024-07-13 | End: 2024-07-16

## 2024-07-13 RX ORDER — ONDANSETRON 4 MG/1
4 TABLET, ORALLY DISINTEGRATING ORAL EVERY 8 HOURS PRN
Qty: 20 TABLET | Refills: 1 | Status: SHIPPED | OUTPATIENT
Start: 2024-07-13

## 2024-07-13 RX ORDER — DOCUSATE SODIUM 100 MG/1
100 CAPSULE, LIQUID FILLED ORAL 2 TIMES DAILY
Qty: 30 CAPSULE | Refills: 0 | Status: SHIPPED | OUTPATIENT
Start: 2024-07-13 | End: 2024-07-28

## 2024-07-13 RX ORDER — IBUPROFEN 800 MG/1
800 TABLET ORAL EVERY 8 HOURS PRN
Qty: 30 TABLET | Refills: 0 | Status: SHIPPED | OUTPATIENT
Start: 2024-07-13 | End: 2024-07-23

## 2024-07-13 RX ADMIN — SODIUM CHLORIDE, PRESERVATIVE FREE 10 ML: 5 INJECTION INTRAVENOUS at 09:18

## 2024-07-13 RX ADMIN — ACETAMINOPHEN 650 MG: 325 TABLET ORAL at 09:14

## 2024-07-13 RX ADMIN — ACETAMINOPHEN 650 MG: 325 TABLET ORAL at 03:56

## 2024-07-13 RX ADMIN — METHOCARBAMOL 500 MG: 500 TABLET ORAL at 07:06

## 2024-07-13 RX ADMIN — METOPROLOL SUCCINATE 50 MG: 50 TABLET, EXTENDED RELEASE ORAL at 09:14

## 2024-07-13 RX ADMIN — METRONIDAZOLE 500 MG: 500 TABLET ORAL at 07:06

## 2024-07-13 RX ADMIN — CALCIUM 500 MG: 500 TABLET ORAL at 09:14

## 2024-07-13 RX ADMIN — ASPIRIN 81 MG: 81 TABLET, COATED ORAL at 09:13

## 2024-07-13 RX ADMIN — WATER 1000 MG: 1 INJECTION INTRAMUSCULAR; INTRAVENOUS; SUBCUTANEOUS at 09:15

## 2024-07-13 ASSESSMENT — PAIN DESCRIPTION - LOCATION
LOCATION: ABDOMEN
LOCATION: ABDOMEN

## 2024-07-13 ASSESSMENT — PAIN SCALES - GENERAL
PAINLEVEL_OUTOF10: 6
PAINLEVEL_OUTOF10: 3

## 2024-07-13 NOTE — PLAN OF CARE
Problem: Discharge Planning  Goal: Discharge to home or other facility with appropriate resources  7/13/2024 1228 by Nedra Johns RN  Outcome: Progressing     Problem: ABCDS Injury Assessment  Goal: Absence of physical injury  7/13/2024 1228 by Nedra Johns RN  Outcome: Progressing     Problem: Pain  Goal: Verbalizes/displays adequate comfort level or baseline comfort level  7/13/2024 1228 by Nedra Johns RN  Outcome: Progressing     Problem: Safety - Adult  Goal: Free from fall injury  7/13/2024 1228 by Nedra Johns RN  Outcome: Progressing     Problem: Nutrition Deficit:  Goal: Optimize nutritional status  7/13/2024 1228 by Nedra Johns RN  Outcome: Progressing     Problem: Skin/Tissue Integrity  Goal: Absence of new skin breakdown  Description: 1.  Monitor for areas of redness and/or skin breakdown  2.  Assess vascular access sites hourly  3.  Every 4-6 hours minimum:  Change oxygen saturation probe site  4.  Every 4-6 hours:  If on nasal continuous positive airway pressure, respiratory therapy assess nares and determine need for appliance change or resting period.  7/13/2024 1228 by Nedra Johns RN  Outcome: Progressing

## 2024-07-13 NOTE — PROGRESS NOTES
Surgery Progress Note      S: bowels moving, feeling fine, wants to go home.    Physical exam:   BP (!) 174/87   Pulse 77   Temp 98.2 °F (36.8 °C) (Oral)   Resp 14   Ht 1.676 m (5' 6\")   Wt 49.9 kg (110 lb)   SpO2 99%   BMI 17.75 kg/m²   General appearance: NAD  Head: NCAT  Neck: supple, no masses  Lungs: equal chest rise bilateral  Heart: S1S2 present  Abdomen: soft, mildly tender, nondistended,  Incisions clean dry and bandaged, ostomy healthy and with air output no stool  Skin; no lesions  Gu: no cva tenderness  Extremities: extremities normal, atraumatic, no cyanosis or edema    A:  POD # 12 Sigmoid colon resection and end colostomy, takedown of colovesical fistula.    Plan:   Ambulate  Regular diet  Discharge home    Tao Menendez MD  7/13/2024

## 2024-07-13 NOTE — PROGRESS NOTES
07/13/24 1055   Encounter Summary   Encounter Overview/Reason Spiritual/Emotional Needs   Service Provided For Patient   Referral/Consult From [facility]   Support System Significant other;Family members   Last Encounter  07/13/24  (L-CW)   Complexity of Encounter High   Spiritual/Emotional needs   Type Spiritual Support   Assessment/Intervention/Outcome   Assessment Compromised coping   Intervention Active listening;Discussed illness injury and it’s impact;Discussed relationship with God;Explored/Affirmed feelings, thoughts, concerns;Nurtured Hope;Prayer (assurance of)/[place];Read/Provided Scripture   Outcome Comfort;Expressed feelings, needs, and concerns;Engaged in conversation;Expressed Gratitude;Peace;Receptive      visited patient. Patient was in bed. Patient appeared to be compromised coping.  provided a listening presence, empathy and offered prayer. Patient was grateful for chaplains visit. Spiritual care is ongoing as needed.     ronald Black  [phone]

## 2024-07-13 NOTE — DISCHARGE SUMMARY
Physician Discharge Summary     Patient ID:  Cherrie Weems  [id number]  64 y.o.  1960    Admit date: 6/30/2024    Discharge date and time: 7/13/2024    Admitting Physician: MD Shay     Admission Diagnoses:   Patient Active Problem List   Diagnosis    Primary hypertension    Mixed hyperlipidemia    Vitamin D deficiency    Panlobular emphysema (HCC)    Perforated viscus    Pneumoperitoneum    Acute hyponatremia    Peritonitis (HCC)    Hypokalemia    Hyponatremia    Elevated platelet count    Anemia associated with acute blood loss       Discharge Diagnoses:   Patient Active Problem List   Diagnosis    Primary hypertension    Mixed hyperlipidemia    Vitamin D deficiency    Panlobular emphysema (HCC)    Perforated viscus    Pneumoperitoneum    Acute hyponatremia    Peritonitis (HCC)    Hypokalemia    Hyponatremia    Elevated platelet count    Anemia associated with acute blood loss       Admission Condition: fair    Discharged Condition: good    Indication for Admission: Perforated viscous    Hospital Course: Cherrie Weems is a 64 y.o. female admitted with perforated viscous and underwent exploratory laparotomy, sigmoid resection and end colostomy. She did well postoperatively, postoperative ileus resolved, diet was advanced, they were ambulating independently and pain was controlled. They were discharged with appropriate medication, instructions and follow up.     Consults: Medicine      Treatments: IV hydration, antibiotics: , analgesia: , and surgery: Ex lap sigmoid resection ostomy    In process/preliminary results:  Outstanding Order Results       No orders found from 6/1/2024 to 7/1/2024.            Patient Instructions:   Current Discharge Medication List        START taking these medications    Details   ibuprofen (IBU) 800 MG tablet Take 1 tablet by mouth every 8 hours as needed for Pain  Qty: 30 tablet, Refills: 0      docusate sodium (COLACE) 100 MG capsule Take 1 capsule by mouth 2 times daily for 15  Care: keep wound clean and dry    Follow-up with Dr Day in 2 weeks.    Signed:  Edgardo Martin MD  7/13/2024  9:02 AM

## 2024-07-13 NOTE — PROGRESS NOTES
[facility] Hospitalist Progress Note    Admitting Date and Time: 6/30/2024  4:41 PM  Admit Dx: Pneumoperitoneum [K66.8]  Diverticulitis of colon [K57.32]  Perforated viscus [R19.8]  Colonic mass [K63.89]    Subjective:  Patient is being followed for Pneumoperitoneum [K66.8]  Diverticulitis of colon [K57.32]  Perforated viscus [R19.8]  Colonic mass [K63.89]   Patient seen and examined.  No new complaints or concerns.  Feeling well.  Tolerating diet.  No urinary or bowel complaints.      ROS: denies fever, chills, cp, sob, n/v, HA unless stated above.      enoxaparin  30 mg SubCUTAneous Daily    metroNIDAZOLE  500 mg Oral 3 times per day    methocarbamol  500 mg Oral TID    vitamin D  50,000 Units Oral Weekly    calcium elemental  500 mg Oral Daily    aspirin  81 mg Oral Daily    metoprolol succinate  50 mg Oral Daily    sodium chloride flush  5-40 mL IntraVENous 2 times per day    cefTRIAXone (ROCEPHIN) IV  1,000 mg IntraVENous Daily    nicotine  1 patch TransDERmal Daily     traMADol, 25 mg, Q4H PRN   Or  traMADol, 50 mg, Q4H PRN  acetaminophen, 650 mg, Q4H PRN  sodium chloride flush, 5-40 mL, PRN  sodium chloride, , PRN  potassium chloride, 40 mEq, PRN   Or  potassium alternative oral replacement, 40 mEq, PRN   Or  potassium chloride, 10 mEq, PRN  magnesium sulfate, 2,000 mg, PRN  ondansetron, 4 mg, Q8H PRN   Or  ondansetron, 4 mg, Q6H PRN  ipratropium 0.5 mg-albuterol 2.5 mg, 1 Dose, Q4H PRN  diphenhydrAMINE, 25 mg, Q6H PRN         Objective:    BP (!) 145/87   Pulse 89   Temp 98.2 °F (36.8 °C) (Oral)   Resp 14   Ht 1.676 m (5' 6\")   Wt 49.9 kg (110 lb)   SpO2 99%   BMI 17.75 kg/m²     General Appearance: alert and oriented to person, place and time and in no acute distress  Skin: warm and dry  Head: normocephalic and atraumatic  Eyes: pupils equal, round, and reactive to light, extraocular eye movements intact, conjunctivae normal  Neck: neck supple and non tender without mass   Pulmonary/Chest:  clear to auscultation bilaterally- no wheezes, rales or rhonchi, normal air movement, no respiratory distress  Cardiovascular: normal rate, normal S1 and S2 and no carotid bruits  Abdomen: soft, non-tender, colostomy  Extremities: no cyanosis, no clubbing and no edema  Neurologic: no cranial nerve deficit and speech normal        Recent Labs     07/10/24  1926 07/11/24  1014 07/12/24  0624   * 130* 132   K 4.0 4.1 3.7   CL 91* 93* 94*   CO2 25 24 26   BUN 9 9 8   CREATININE 0.5 0.5 0.6   GLUCOSE 118* 127* 104*   CALCIUM 9.2 9.2 8.9       Recent Labs     07/11/24  1014 07/12/24  0624   WBC 10.4 8.3   RBC 3.59 3.20*   HGB 10.6* 9.8*   HCT 32.4* 29.4*   MCV 90.3 91.9   MCH 29.5 30.6   MCHC 32.7 33.3   RDW 13.0 13.4   * 508*   MPV 9.1 9.2       Labs and imaging reviewed    Assessment:    Principal Problem:    Perforated viscus  Active Problems:    Primary hypertension    Mixed hyperlipidemia    Panlobular emphysema (HCC)    Pneumoperitoneum    Acute hyponatremia    Peritonitis (HCC)    Hypokalemia    Hyponatremia    Elevated platelet count    Anemia associated with acute blood loss  Resolved Problems:    * No resolved hospital problems. *      Plan:  Status post exploratory laparotomy, sigmoid colon resection with end descending colostomy, takedown of colovesicular fistula with repair bladder, mobilization of splenic flexure, mobilization and placement of omental flap and open drainage of intra-abdominal abscess.  General surgery following and she is discharging home today.  Hypotonic hyponatremia, possible SIADH versus poor oral intake, nephrology following appreciate recommendations, sodium improved to 132   Elevated proBNP, Lasix given yesterday.  Echo with EF 55 to 60%.  Hypertension, continue metoprolol, monitor  Anemia, continue to monitor and transfuse if hemoglobin less than 7    Internal medicine okay for discharge.    Discharge planning, patient will return home with home health care on discharge.

## 2024-07-13 NOTE — CARE COORDINATION
Ss note: 7/13/20249:32 AM Discharge order noted. Beverley spoke with Darlene with [facility] whom relays that [facility] can open the pt tomorrow around noon. [facility] requests that wound care supplies be sent home with pt for about a weeks worth of changes as [facility] will order supplies. Nursing notified. NIKOLAS Walton

## 2024-07-13 NOTE — DISCHARGE INSTR - DIET

## 2024-07-15 ENCOUNTER — TELEPHONE (OUTPATIENT)
Dept: PRIMARY CARE CLINIC | Age: 64
End: 2024-07-15

## 2024-07-15 NOTE — TELEPHONE ENCOUNTER
Care Transitions Initial Follow Up Call    Outreach made within 2 business days of discharge: Yes    Patient: Cherrie Weems Patient : 1960   MRN: [id number]  Reason for Admission: There are no discharge diagnoses documented for the most recent discharge.  Discharge Date: 24       Spoke with: patient    Discharge department/facility: [facility] Interactive Patient Contact:  Was patient able to fill all prescriptions: Yes  Was patient instructed to bring all medications to the follow-up visit: Yes  Is patient taking all medications as directed in the discharge summary? Yes  Does patient understand their discharge instructions: Yes  Does patient have questions or concerns that need addressed prior to 7-14 day follow up office visit: no    Scheduled appointment with PCP within 7-14 days    Follow Up  Future Appointments   Date Time Provider Department Center   2024 10:15 AM Janette Arellano MD [facility]   2024  1:00 PM Janette Arellano MD [facility]       Mar Howe MA

## 2024-07-17 ENCOUNTER — OFFICE VISIT (OUTPATIENT)
Dept: PRIMARY CARE CLINIC | Age: 64
End: 2024-07-17

## 2024-07-17 VITALS
DIASTOLIC BLOOD PRESSURE: 80 MMHG | SYSTOLIC BLOOD PRESSURE: 120 MMHG | BODY MASS INDEX: 17.27 KG/M2 | TEMPERATURE: 98.2 F | WEIGHT: 107 LBS | OXYGEN SATURATION: 98 % | HEART RATE: 78 BPM

## 2024-07-17 DIAGNOSIS — Z43.3 COLOSTOMY CARE (HCC): ICD-10-CM

## 2024-07-17 DIAGNOSIS — R19.8 PERFORATED ABDOMINAL VISCUS: ICD-10-CM

## 2024-07-17 DIAGNOSIS — I10 PRIMARY HYPERTENSION: ICD-10-CM

## 2024-07-17 DIAGNOSIS — Z09 HOSPITAL DISCHARGE FOLLOW-UP: Primary | ICD-10-CM

## 2024-07-17 NOTE — PROGRESS NOTES
patient taking as of now reconciled against medications ordered at time of hospital discharge: Yes    A comprehensive review of systems was negative except for what was noted in the HPI.    Objective:    /80   Pulse 78   Temp 98.2 °F (36.8 °C) (Temporal)   Wt 48.5 kg (107 lb)   SpO2 98%   BMI 17.27 kg/m²   General Appearance: alert and oriented to person, place and time, well developed and well- nourished, in no acute distress  Skin: warm and dry, no rash or erythema  Head: normocephalic and atraumatic  Eyes: pupils equal, round, and reactive to light, extraocular eye movements intact, conjunctivae normal  ENT: tympanic membrane, external ear and ear canal normal bilaterally, nose without deformity, nasal mucosa and turbinates normal without polyps  Neck: supple and non-tender without mass, no thyromegaly or thyroid nodules, no cervical lymphadenopathy  Pulmonary/Chest: clear to auscultation bilaterally- no wheezes, rales or rhonchi, normal air movement, no respiratory distress  Cardiovascular: normal rate, regular rhythm, normal S1 and S2, no murmurs, rubs, clicks, or gallops, distal pulses intact, no carotid bruits  Abdomen: soft, tender, abdominal pain and wound and colostomy  Extremities: no cyanosis, clubbing or edema  Musculoskeletal: normal range of motion, no joint swelling, deformity or tenderness  Neurologic: reflexes normal and symmetric, no cranial nerve deficit, gait, coordination and speech normal      An electronic signature was used to authenticate this note.  --Janette Arellano MD

## 2024-07-18 ENCOUNTER — TELEPHONE (OUTPATIENT)
Dept: PRIMARY CARE CLINIC | Age: 64
End: 2024-07-18

## 2024-07-18 ENCOUNTER — OFFICE VISIT (OUTPATIENT)
Dept: SURGERY | Age: 64
End: 2024-07-18

## 2024-07-18 VITALS
HEART RATE: 63 BPM | TEMPERATURE: 97.2 F | HEIGHT: 66 IN | BODY MASS INDEX: 17.27 KG/M2 | SYSTOLIC BLOOD PRESSURE: 154 MMHG | RESPIRATION RATE: 16 BRPM | DIASTOLIC BLOOD PRESSURE: 73 MMHG

## 2024-07-18 DIAGNOSIS — R19.8 PERFORATED ABDOMINAL VISCUS: Primary | ICD-10-CM

## 2024-07-18 DIAGNOSIS — K57.32 DIVERTICULITIS OF COLON: Primary | ICD-10-CM

## 2024-07-18 PROCEDURE — 99024 POSTOP FOLLOW-UP VISIT: CPT | Performed by: SURGERY

## 2024-07-18 RX ORDER — OXYCODONE HYDROCHLORIDE AND ACETAMINOPHEN 5; 325 MG/1; MG/1
1 TABLET ORAL EVERY 6 HOURS PRN
Qty: 120 TABLET | Refills: 0 | Status: SHIPPED | OUTPATIENT
Start: 2024-07-18 | End: 2024-08-17

## 2024-07-18 NOTE — PROGRESS NOTES
Surgery Progress Note            Chief complaint:   Patient Active Problem List   Diagnosis    Primary hypertension    Mixed hyperlipidemia    Vitamin D deficiency    Panlobular emphysema (HCC)    Perforated viscus    Pneumoperitoneum    Acute hyponatremia    Peritonitis (HCC)    Hypokalemia    Hyponatremia    Elevated platelet count    Anemia associated with acute blood loss       S: doing well    O:   Vitals:    07/18/24 1002   BP: (!) 154/73   Pulse: 63   Resp: 16   Temp: 97.2 °F (36.2 °C)     No intake or output data in the 24 hours ending 07/18/24 1036        Labs:  No results for input(s): \"WBC\", \"HGB\", \"HCT\" in the last 72 hours.    Invalid input(s): \"PLR\"  Lab Results   Component Value Date    CREATININE 0.6 07/12/2024    BUN 8 07/12/2024     07/12/2024    K 3.7 07/12/2024    CL 94 (L) 07/12/2024    CO2 26 07/12/2024     No results for input(s): \"LIPASE\", \"AMYLASE\" in the last 72 hours.    Physical exam:   BP (!) 154/73 (Site: Left Upper Arm, Position: Sitting, Cuff Size: Small Adult)   Pulse 63   Temp 97.2 °F (36.2 °C) (Infrared)   Resp 16   Ht 1.676 m (5' 6\")   BMI 17.27 kg/m²   General appearance: NAD  Head: NCAT  Neck: supple, no masses  Lungs: equal chest rise bilateral  Heart: S1S2 present  Abdomen: soft, nontender, nondistended  Skin; no new lesions, incisions clean and intact  Gu: no cva tenderness  Extremities: extremities normal, atraumatic, no cyanosis or edema    A:  Post op ex lap sigmoid resection with ostomy and retention out and wound loosely approximated now with nylon suture.     P: follow up in 3 weeks for suture removal    Abdi Day MD, MD  7/18/2024

## 2024-07-19 LAB
ANION GAP SERPL CALCULATED.3IONS-SCNC: 13 MMOL/L (ref 7–16)
BUN SERPL-MCNC: 9 MG/DL (ref 6–23)
CALCIUM SERPL-MCNC: 9.8 MG/DL (ref 8.6–10.2)
CHLORIDE SERPL-SCNC: 100 MMOL/L (ref 98–107)
CO2 SERPL-SCNC: 26 MMOL/L (ref 22–29)
CREAT SERPL-MCNC: 0.6 MG/DL (ref 0.5–1)
GFR, ESTIMATED: >90 ML/MIN/1.73M2
GLUCOSE SERPL-MCNC: 81 MG/DL (ref 74–99)
POTASSIUM SERPL-SCNC: 4.1 MMOL/L (ref 3.5–5)
SODIUM SERPL-SCNC: 139 MMOL/L (ref 132–146)

## 2024-07-24 NOTE — PROGRESS NOTES
Cherrie Weems (:  1960) is a 64 y.o. female,Established patient, here for evaluation of the following chief complaint(s):  1 Month Follow-Up      Subjective   SUBJECTIVE/OBJECTIVE:  HPI:  Hypertension:  Patient is here for follow up chronic hypertension.  This is  generally controlled on current medication regimen.    BP Readings from Last 1 Encounters:   24 122/64        Takes meds as directed and tolerates them well.  Most recent labs reviewed with patient and are not remarkable.  No symptoms from htn standpoint per ROS.  Patient is  compliant with lifestyle modifications.  Patient does not smoke.  Comorbid conditions include obesity  Hyperlipidemia:  Patient is here to follow up regarding chronic hyperlipidemia.  This is  generally controlled.  Treatment includes crestor  Patient is  compliant with lifestyle modifications.  Patient is not a smoker.  Most recent labs reviewed with patient today and are not remarkable.  Comorbid conditions include obesity.   3) wound on abdomen : healing well     Review of Systems   Constitutional:  Negative for activity change, appetite change, fever and unexpected weight change.   HENT:  Negative for congestion, ear pain, hearing loss, sinus pain, sore throat, tinnitus and trouble swallowing.    Eyes:  Negative for photophobia, discharge, itching and visual disturbance.   Respiratory:  Negative for apnea, cough, shortness of breath and wheezing.    Cardiovascular:  Negative for chest pain, palpitations and leg swelling.   Gastrointestinal:  Negative for abdominal distention, abdominal pain, blood in stool, constipation, diarrhea, nausea and vomiting.   Endocrine: Negative for cold intolerance, polydipsia and polyuria.   Genitourinary:  Negative for difficulty urinating, dysuria, frequency and pelvic pain.   Musculoskeletal:  Negative for arthralgias, back pain, joint swelling, myalgias, neck pain and neck stiffness.   Skin:  Negative for rash and wound.

## 2024-08-15 ENCOUNTER — OFFICE VISIT (OUTPATIENT)
Dept: SURGERY | Age: 64
End: 2024-08-15

## 2024-08-15 VITALS — TEMPERATURE: 97.3 F | DIASTOLIC BLOOD PRESSURE: 73 MMHG | HEART RATE: 61 BPM | SYSTOLIC BLOOD PRESSURE: 160 MMHG

## 2024-08-15 DIAGNOSIS — L30.9 STOMA DERMATITIS: Primary | ICD-10-CM

## 2024-08-15 PROCEDURE — 99024 POSTOP FOLLOW-UP VISIT: CPT | Performed by: SURGERY

## 2024-08-15 NOTE — PROGRESS NOTES
Surgery Progress Note            Chief complaint:   Patient Active Problem List   Diagnosis    Primary hypertension    Mixed hyperlipidemia    Vitamin D deficiency    Panlobular emphysema (HCC)    Perforated viscus    Pneumoperitoneum    Acute hyponatremia    Peritonitis (HCC)    Hypokalemia    Hyponatremia    Elevated platelet count    Anemia associated with acute blood loss       S: doing well    O:   Vitals:    08/15/24 0957   BP: (!) 160/73   Pulse: 61   Temp: 97.3 °F (36.3 °C)     No intake or output data in the 24 hours ending 08/15/24 1016        Labs:  No results for input(s): \"WBC\", \"HGB\", \"HCT\" in the last 72 hours.    Invalid input(s): \"PLR\"  Lab Results   Component Value Date    CREATININE 0.6 07/19/2024    BUN 9 07/19/2024     07/19/2024    K 4.1 07/19/2024     07/19/2024    CO2 26 07/19/2024     No results for input(s): \"LIPASE\", \"AMYLASE\" in the last 72 hours.    Physical exam:   BP (!) 160/73 (Site: Left Upper Arm, Position: Sitting, Cuff Size: Medium Adult)   Pulse 61   Temp 97.3 °F (36.3 °C)   General appearance: NAD  Head: NCAT  Neck: supple, no masses  Lungs: equal chest rise bilateral  Heart: S1S2 present  Abdomen: soft, nontender, nondistended  Skin; no new lesions, incisions clean and intact  Gu: no cva tenderness  Extremities: extremities normal, atraumatic, no cyanosis or edema    A:  Post op ex lap sigmoid resection with ostomy     P: follow up in 4-5 months     Abdi Day MD, MD  8/15/2024

## 2024-08-23 ENCOUNTER — OFFICE VISIT (OUTPATIENT)
Dept: PRIMARY CARE CLINIC | Age: 64
End: 2024-08-23
Payer: COMMERCIAL

## 2024-08-23 VITALS
HEART RATE: 54 BPM | HEIGHT: 66 IN | OXYGEN SATURATION: 98 % | SYSTOLIC BLOOD PRESSURE: 122 MMHG | TEMPERATURE: 97.4 F | WEIGHT: 106.8 LBS | BODY MASS INDEX: 17.16 KG/M2 | DIASTOLIC BLOOD PRESSURE: 64 MMHG

## 2024-08-23 DIAGNOSIS — E78.2 MIXED HYPERLIPIDEMIA: ICD-10-CM

## 2024-08-23 DIAGNOSIS — R19.8 PERFORATED ABDOMINAL VISCUS: Primary | ICD-10-CM

## 2024-08-23 DIAGNOSIS — I10 PRIMARY HYPERTENSION: ICD-10-CM

## 2024-08-23 DIAGNOSIS — Z43.3 COLOSTOMY CARE (HCC): ICD-10-CM

## 2024-08-23 PROCEDURE — 4004F PT TOBACCO SCREEN RCVD TLK: CPT | Performed by: INTERNAL MEDICINE

## 2024-08-23 PROCEDURE — 99214 OFFICE O/P EST MOD 30 MIN: CPT | Performed by: INTERNAL MEDICINE

## 2024-08-23 PROCEDURE — 3078F DIAST BP <80 MM HG: CPT | Performed by: INTERNAL MEDICINE

## 2024-08-23 PROCEDURE — G8427 DOCREV CUR MEDS BY ELIG CLIN: HCPCS | Performed by: INTERNAL MEDICINE

## 2024-08-23 PROCEDURE — 1090F PRES/ABSN URINE INCON ASSESS: CPT | Performed by: INTERNAL MEDICINE

## 2024-08-23 PROCEDURE — G8419 CALC BMI OUT NRM PARAM NOF/U: HCPCS | Performed by: INTERNAL MEDICINE

## 2024-08-23 PROCEDURE — 3074F SYST BP LT 130 MM HG: CPT | Performed by: INTERNAL MEDICINE

## 2024-08-23 PROCEDURE — 3017F COLORECTAL CA SCREEN DOC REV: CPT | Performed by: INTERNAL MEDICINE

## 2024-08-23 PROCEDURE — 1123F ACP DISCUSS/DSCN MKR DOCD: CPT | Performed by: INTERNAL MEDICINE

## 2024-08-23 PROCEDURE — G8400 PT W/DXA NO RESULTS DOC: HCPCS | Performed by: INTERNAL MEDICINE

## 2024-08-23 RX ORDER — IBUPROFEN 200 MG
400 TABLET ORAL NIGHTLY
COMMUNITY

## 2024-10-28 NOTE — PROGRESS NOTES
Cherrie Weems (:  1960) is a 64 y.o. female,Established patient, here for evaluation of the following chief complaint(s):  Hypertension (6 month f/u)      Subjective   SUBJECTIVE/OBJECTIVE:  HPI:  Hypertension:  Patient is here for follow up chronic hypertension.  This is  generally controlled on current medication regimen.    BP Readings from Last 1 Encounters:   24 110/70        Takes meds as directed and tolerates them well.  Most recent labs reviewed with patient and are not remarkable.  No symptoms from htn standpoint per ROS.  Patient is  compliant with lifestyle modifications.  Patient does not smoke.  Comorbid conditions include obesity      Review of Systems   Constitutional:  Negative for activity change, appetite change, fever and unexpected weight change.   HENT:  Negative for congestion, ear pain, hearing loss, sinus pain, sore throat, tinnitus and trouble swallowing.    Eyes:  Negative for photophobia, discharge, itching and visual disturbance.   Respiratory:  Negative for apnea, cough, shortness of breath and wheezing.    Cardiovascular:  Negative for chest pain, palpitations and leg swelling.   Gastrointestinal:  Negative for abdominal distention, abdominal pain, blood in stool, constipation, diarrhea, nausea and vomiting.   Endocrine: Negative for cold intolerance, polydipsia and polyuria.   Genitourinary:  Negative for difficulty urinating, dysuria, frequency and pelvic pain.   Musculoskeletal:  Negative for arthralgias, back pain, joint swelling, myalgias, neck pain and neck stiffness.   Skin:  Negative for rash and wound.   Neurological:  Negative for dizziness, tremors, syncope, light-headedness and headaches.     CBC with Differential:    Lab Results   Component Value Date/Time    WBC 8.0 2024 09:15 AM    RBC 4.54 2024 09:15 AM    HGB 14.4 2024 09:15 AM    HCT 42.7 2024 09:15 AM     2024 09:15 AM    MCV 94.1 2024 09:15 AM    MCH 31.7

## 2024-11-19 ENCOUNTER — HOSPITAL ENCOUNTER (OUTPATIENT)
Age: 64
Discharge: HOME | End: 2024-11-19
Payer: COMMERCIAL

## 2024-11-19 DIAGNOSIS — E78.2 MIXED HYPERLIPIDEMIA: ICD-10-CM

## 2024-11-19 DIAGNOSIS — R19.8 PERFORATED ABDOMINAL VISCUS: ICD-10-CM

## 2024-11-19 LAB
ALBUMIN SERPL-MCNC: 4.3 G/DL (ref 3.5–5.2)
ALP SERPL-CCNC: 87 U/L (ref 35–104)
ALT SERPL-CCNC: 19 U/L (ref 0–32)
ANION GAP SERPL CALCULATED.3IONS-SCNC: 8 MMOL/L (ref 7–16)
AST SERPL-CCNC: 21 U/L (ref 0–31)
BASOPHILS # BLD: 0.03 K/UL (ref 0–0.2)
BASOPHILS NFR BLD: 0 % (ref 0–2)
BILIRUB SERPL-MCNC: 0.8 MG/DL (ref 0–1.2)
BUN SERPL-MCNC: 16 MG/DL (ref 6–23)
CALCIUM SERPL-MCNC: 9.7 MG/DL (ref 8.6–10.2)
CHLORIDE SERPL-SCNC: 103 MMOL/L (ref 98–107)
CHOLEST SERPL-MCNC: 162 MG/DL
CO2 SERPL-SCNC: 29 MMOL/L (ref 22–29)
CREAT SERPL-MCNC: 0.8 MG/DL (ref 0.5–1)
EOSINOPHIL # BLD: 0.22 K/UL (ref 0.05–0.5)
EOSINOPHILS RELATIVE PERCENT: 3 % (ref 0–6)
ERYTHROCYTE [DISTWIDTH] IN BLOOD BY AUTOMATED COUNT: 12.3 % (ref 11.5–15)
GFR, ESTIMATED: 84 ML/MIN/1.73M2
GLUCOSE SERPL-MCNC: 103 MG/DL (ref 74–99)
HCT VFR BLD AUTO: 42.7 % (ref 34–48)
HDLC SERPL-MCNC: 57 MG/DL
HGB BLD-MCNC: 14.4 G/DL (ref 11.5–15.5)
IMM GRANULOCYTES # BLD AUTO: 0.03 K/UL (ref 0–0.58)
IMM GRANULOCYTES NFR BLD: 0 % (ref 0–5)
LDLC SERPL CALC-MCNC: 88 MG/DL
LYMPHOCYTES NFR BLD: 1.15 K/UL (ref 1.5–4)
LYMPHOCYTES RELATIVE PERCENT: 14 % (ref 20–42)
MCH RBC QN AUTO: 31.7 PG (ref 26–35)
MCHC RBC AUTO-ENTMCNC: 33.7 G/DL (ref 32–34.5)
MCV RBC AUTO: 94.1 FL (ref 80–99.9)
MONOCYTES NFR BLD: 0.52 K/UL (ref 0.1–0.95)
MONOCYTES NFR BLD: 7 % (ref 2–12)
NEUTROPHILS NFR BLD: 76 % (ref 43–80)
NEUTS SEG NFR BLD: 6.06 K/UL (ref 1.8–7.3)
PLATELET # BLD AUTO: 239 K/UL (ref 130–450)
PMV BLD AUTO: 10.3 FL (ref 7–12)
POTASSIUM SERPL-SCNC: 4.3 MMOL/L (ref 3.5–5)
PROT SERPL-MCNC: 7.2 G/DL (ref 6.4–8.3)
RBC # BLD AUTO: 4.54 M/UL (ref 3.5–5.5)
SODIUM SERPL-SCNC: 140 MMOL/L (ref 132–146)
T4 FREE SERPL-MCNC: 1.2 NG/DL (ref 0.9–1.7)
TRIGL SERPL-MCNC: 84 MG/DL
TSH SERPL DL<=0.05 MIU/L-ACNC: 2.04 UIU/ML (ref 0.27–4.2)
VLDLC SERPL CALC-MCNC: 17 MG/DL
WBC OTHER # BLD: 8 K/UL (ref 4.5–11.5)

## 2024-11-19 PROCEDURE — 85025 COMPLETE CBC W/AUTO DIFF WBC: CPT

## 2024-11-19 PROCEDURE — 36415 COLL VENOUS BLD VENIPUNCTURE: CPT

## 2024-11-19 PROCEDURE — 80061 LIPID PANEL: CPT

## 2024-11-19 PROCEDURE — 84439 ASSAY OF FREE THYROXINE: CPT

## 2024-11-19 PROCEDURE — 84443 ASSAY THYROID STIM HORMONE: CPT

## 2024-11-19 PROCEDURE — 80053 COMPREHEN METABOLIC PANEL: CPT

## 2024-11-26 ENCOUNTER — OFFICE VISIT (OUTPATIENT)
Dept: PRIMARY CARE CLINIC | Age: 64
End: 2024-11-26
Payer: COMMERCIAL

## 2024-11-26 VITALS
SYSTOLIC BLOOD PRESSURE: 110 MMHG | DIASTOLIC BLOOD PRESSURE: 70 MMHG | BODY MASS INDEX: 17.68 KG/M2 | WEIGHT: 110 LBS | OXYGEN SATURATION: 98 % | HEIGHT: 66 IN | HEART RATE: 59 BPM | TEMPERATURE: 98.6 F

## 2024-11-26 DIAGNOSIS — Z12.31 ENCOUNTER FOR SCREENING MAMMOGRAM FOR MALIGNANT NEOPLASM OF BREAST: ICD-10-CM

## 2024-11-26 DIAGNOSIS — Z43.3 COLOSTOMY CARE (HCC): ICD-10-CM

## 2024-11-26 DIAGNOSIS — E78.2 MIXED HYPERLIPIDEMIA: Primary | ICD-10-CM

## 2024-11-26 DIAGNOSIS — M81.0 AGE-RELATED OSTEOPOROSIS WITHOUT CURRENT PATHOLOGICAL FRACTURE: ICD-10-CM

## 2024-11-26 DIAGNOSIS — I10 PRIMARY HYPERTENSION: ICD-10-CM

## 2024-11-26 PROCEDURE — 1090F PRES/ABSN URINE INCON ASSESS: CPT | Performed by: INTERNAL MEDICINE

## 2024-11-26 PROCEDURE — G8427 DOCREV CUR MEDS BY ELIG CLIN: HCPCS | Performed by: INTERNAL MEDICINE

## 2024-11-26 PROCEDURE — 3074F SYST BP LT 130 MM HG: CPT | Performed by: INTERNAL MEDICINE

## 2024-11-26 PROCEDURE — 4004F PT TOBACCO SCREEN RCVD TLK: CPT | Performed by: INTERNAL MEDICINE

## 2024-11-26 PROCEDURE — 99214 OFFICE O/P EST MOD 30 MIN: CPT | Performed by: INTERNAL MEDICINE

## 2024-11-26 PROCEDURE — 3078F DIAST BP <80 MM HG: CPT | Performed by: INTERNAL MEDICINE

## 2024-11-26 PROCEDURE — G8484 FLU IMMUNIZE NO ADMIN: HCPCS | Performed by: INTERNAL MEDICINE

## 2024-11-26 PROCEDURE — 3017F COLORECTAL CA SCREEN DOC REV: CPT | Performed by: INTERNAL MEDICINE

## 2024-11-26 PROCEDURE — 1123F ACP DISCUSS/DSCN MKR DOCD: CPT | Performed by: INTERNAL MEDICINE

## 2024-11-26 PROCEDURE — G8400 PT W/DXA NO RESULTS DOC: HCPCS | Performed by: INTERNAL MEDICINE

## 2024-11-26 PROCEDURE — G8419 CALC BMI OUT NRM PARAM NOF/U: HCPCS | Performed by: INTERNAL MEDICINE

## 2024-11-26 RX ORDER — METOPROLOL SUCCINATE 50 MG/1
TABLET, EXTENDED RELEASE ORAL
Qty: 90 TABLET | Refills: 1 | Status: SHIPPED | OUTPATIENT
Start: 2024-11-26

## 2024-11-26 RX ORDER — ROSUVASTATIN CALCIUM 10 MG/1
10 TABLET, COATED ORAL DAILY
Qty: 90 TABLET | Refills: 1 | Status: SHIPPED | OUTPATIENT
Start: 2024-11-26

## 2024-11-26 SDOH — ECONOMIC STABILITY: FOOD INSECURITY: WITHIN THE PAST 12 MONTHS, YOU WORRIED THAT YOUR FOOD WOULD RUN OUT BEFORE YOU GOT MONEY TO BUY MORE.: NEVER TRUE

## 2024-11-26 SDOH — ECONOMIC STABILITY: INCOME INSECURITY: HOW HARD IS IT FOR YOU TO PAY FOR THE VERY BASICS LIKE FOOD, HOUSING, MEDICAL CARE, AND HEATING?: NOT HARD AT ALL

## 2024-11-26 SDOH — ECONOMIC STABILITY: FOOD INSECURITY: WITHIN THE PAST 12 MONTHS, THE FOOD YOU BOUGHT JUST DIDN'T LAST AND YOU DIDN'T HAVE MONEY TO GET MORE.: NEVER TRUE

## 2024-12-05 ENCOUNTER — TELEPHONE (OUTPATIENT)
Dept: SURGERY | Age: 64
End: 2024-12-05

## 2024-12-05 ENCOUNTER — OFFICE VISIT (OUTPATIENT)
Dept: SURGERY | Age: 64
End: 2024-12-05
Payer: COMMERCIAL

## 2024-12-05 VITALS
HEART RATE: 58 BPM | TEMPERATURE: 97 F | BODY MASS INDEX: 18.03 KG/M2 | HEIGHT: 66 IN | DIASTOLIC BLOOD PRESSURE: 69 MMHG | SYSTOLIC BLOOD PRESSURE: 138 MMHG

## 2024-12-05 DIAGNOSIS — Z93.3 COLOSTOMY IN PLACE (HCC): ICD-10-CM

## 2024-12-05 DIAGNOSIS — Z93.3 COLOSTOMY IN PLACE (HCC): Primary | ICD-10-CM

## 2024-12-05 PROCEDURE — 1090F PRES/ABSN URINE INCON ASSESS: CPT | Performed by: SURGERY

## 2024-12-05 PROCEDURE — 3017F COLORECTAL CA SCREEN DOC REV: CPT | Performed by: SURGERY

## 2024-12-05 PROCEDURE — G8419 CALC BMI OUT NRM PARAM NOF/U: HCPCS | Performed by: SURGERY

## 2024-12-05 PROCEDURE — 4004F PT TOBACCO SCREEN RCVD TLK: CPT | Performed by: SURGERY

## 2024-12-05 PROCEDURE — G8400 PT W/DXA NO RESULTS DOC: HCPCS | Performed by: SURGERY

## 2024-12-05 PROCEDURE — 3078F DIAST BP <80 MM HG: CPT | Performed by: SURGERY

## 2024-12-05 PROCEDURE — 99214 OFFICE O/P EST MOD 30 MIN: CPT | Performed by: SURGERY

## 2024-12-05 PROCEDURE — 3075F SYST BP GE 130 - 139MM HG: CPT | Performed by: SURGERY

## 2024-12-05 PROCEDURE — 1123F ACP DISCUSS/DSCN MKR DOCD: CPT | Performed by: SURGERY

## 2024-12-05 PROCEDURE — G8427 DOCREV CUR MEDS BY ELIG CLIN: HCPCS | Performed by: SURGERY

## 2024-12-05 PROCEDURE — G8484 FLU IMMUNIZE NO ADMIN: HCPCS | Performed by: SURGERY

## 2024-12-05 RX ORDER — ERYTHROMYCIN 500 MG/1
500 TABLET, COATED ORAL 3 TIMES DAILY
Qty: 3 TABLET | Refills: 0 | Status: SHIPPED | OUTPATIENT
Start: 2024-12-05 | End: 2024-12-06

## 2024-12-05 RX ORDER — NEOMYCIN SULFATE 500 MG/1
1000 TABLET ORAL SEE ADMIN INSTRUCTIONS
Qty: 6 TABLET | Refills: 0 | Status: SHIPPED | OUTPATIENT
Start: 2024-12-05 | End: 2024-12-06

## 2024-12-05 NOTE — PROGRESS NOTES
possible ostomy  Discussed the risk, benefits and alternatives of surgery including wound infections, bleeding, scar and hernia formation and the risks of general anesthetic including MI, CVA, sudden death or reactions to anesthetic medications. The patient understands the risks and alternatives and the possibility of converting to an open procedure. All questions were answered to the patient's satisfaction and they freely signed the consent.      Abdi Day MD  9:44 AM  12/5/2024

## 2024-12-05 NOTE — TELEPHONE ENCOUNTER
Per the order of Dr. Day, patient has been scheduled for Laparoscopic, possible open colostomy reversal, possible open on 2024.  Patient provided with instruction sheet and post operative appointment.  Patient instructed to please contact our office with any questions.    Patient provided with bowel prep and miralx bowel prep.  Patient aware EMYCIN and NEOMYCIN were sent to her pharmacy.    Procedure scheduled through [facility].  Dr. Day to enter orders.      Prior Authorization Form:      DEMOGRAPHICS:                     Patient Name:  Cherrie Weems  Patient :  1960            Insurance:  Payor: [employer] / Plan: [employer]  [id number] / Product Type: *No Product type* /   Insurance ID Number:    Payer/Plan Subscr  Sex Relation Sub. Ins. ID Effective Group Num   1. [employer]* CHERRIE WEEMS 1960 Female Self [id number] 10[birth year]/07 [id number]                                   [address]         DIAGNOSIS & PROCEDURE:                       Procedure/Operation: Laparoscopic, possible open colostomy reversal, possible open         CPT Code: 19162    Diagnosis:  Colostomy in place     ICD10 Code: Z93.3    Location:  [facility]    Surgeon:  Shay    SCHEDULING INFORMATION:                          Date: 2024    Time: TBD              Anesthesia:  Choice                                                       Status:  Inpatient        Special Comments:  N/A       Electronically signed by Delfino Barker MA on 2024 at 2:10 PM

## 2024-12-06 ENCOUNTER — PREP FOR PROCEDURE (OUTPATIENT)
Dept: BARIATRICS/WEIGHT MGMT | Age: 64
End: 2024-12-06

## 2024-12-06 RX ORDER — SODIUM CHLORIDE, SODIUM LACTATE, POTASSIUM CHLORIDE, CALCIUM CHLORIDE 600; 310; 30; 20 MG/100ML; MG/100ML; MG/100ML; MG/100ML
INJECTION, SOLUTION INTRAVENOUS CONTINUOUS
Status: CANCELLED | OUTPATIENT
Start: 2024-12-06

## 2024-12-06 RX ORDER — SODIUM CHLORIDE 9 MG/ML
INJECTION, SOLUTION INTRAVENOUS PRN
Status: CANCELLED | OUTPATIENT
Start: 2024-12-06

## 2024-12-06 RX ORDER — SODIUM CHLORIDE 0.9 % (FLUSH) 0.9 %
5-40 SYRINGE (ML) INJECTION EVERY 12 HOURS SCHEDULED
Status: CANCELLED | OUTPATIENT
Start: 2024-12-06

## 2024-12-06 RX ORDER — SODIUM CHLORIDE 0.9 % (FLUSH) 0.9 %
5-40 SYRINGE (ML) INJECTION PRN
Status: CANCELLED | OUTPATIENT
Start: 2024-12-06

## 2024-12-09 ENCOUNTER — HOSPITAL ENCOUNTER (OUTPATIENT)
Dept: PREADMISSION TESTING | Age: 64
Discharge: HOME | End: 2024-12-09
Payer: COMMERCIAL

## 2024-12-09 DIAGNOSIS — Z01.818 PRE-OP TESTING: ICD-10-CM

## 2024-12-09 DIAGNOSIS — Z93.3 COLOSTOMY IN PLACE (HCC): Primary | ICD-10-CM

## 2024-12-09 LAB
ANION GAP SERPL CALCULATED.3IONS-SCNC: 9 MMOL/L (ref 7–16)
BASOPHILS # BLD: 0.02 K/UL (ref 0–0.2)
BASOPHILS NFR BLD: 0 % (ref 0–2)
BUN SERPL-MCNC: 14 MG/DL (ref 6–23)
CALCIUM SERPL-MCNC: 9.6 MG/DL (ref 8.6–10.2)
CHLORIDE SERPL-SCNC: 102 MMOL/L (ref 98–107)
CO2 SERPL-SCNC: 27 MMOL/L (ref 22–29)
CREAT SERPL-MCNC: 0.8 MG/DL (ref 0.5–1)
EOSINOPHIL # BLD: 0.11 K/UL (ref 0.05–0.5)
EOSINOPHILS RELATIVE PERCENT: 2 % (ref 0–6)
ERYTHROCYTE [DISTWIDTH] IN BLOOD BY AUTOMATED COUNT: 12.8 % (ref 11.5–15)
GFR, ESTIMATED: 87 ML/MIN/1.73M2
GLUCOSE SERPL-MCNC: 99 MG/DL (ref 74–99)
HCT VFR BLD AUTO: 38.4 % (ref 34–48)
HGB BLD-MCNC: 12.9 G/DL (ref 11.5–15.5)
IMM GRANULOCYTES # BLD AUTO: <0.03 K/UL (ref 0–0.58)
IMM GRANULOCYTES NFR BLD: 0 % (ref 0–5)
LYMPHOCYTES NFR BLD: 1.34 K/UL (ref 1.5–4)
LYMPHOCYTES RELATIVE PERCENT: 25 % (ref 20–42)
MCH RBC QN AUTO: 31 PG (ref 26–35)
MCHC RBC AUTO-ENTMCNC: 33.6 G/DL (ref 32–34.5)
MCV RBC AUTO: 92.3 FL (ref 80–99.9)
MONOCYTES NFR BLD: 0.41 K/UL (ref 0.1–0.95)
MONOCYTES NFR BLD: 8 % (ref 2–12)
NEUTROPHILS NFR BLD: 65 % (ref 43–80)
NEUTS SEG NFR BLD: 3.55 K/UL (ref 1.8–7.3)
PLATELET # BLD AUTO: 219 K/UL (ref 130–450)
PMV BLD AUTO: 9.9 FL (ref 7–12)
POTASSIUM SERPL-SCNC: 4.1 MMOL/L (ref 3.5–5)
RBC # BLD AUTO: 4.16 M/UL (ref 3.5–5.5)
SODIUM SERPL-SCNC: 138 MMOL/L (ref 132–146)
WBC OTHER # BLD: 5.4 K/UL (ref 4.5–11.5)

## 2024-12-09 PROCEDURE — 87081 CULTURE SCREEN ONLY: CPT

## 2024-12-09 PROCEDURE — 80048 BASIC METABOLIC PNL TOTAL CA: CPT

## 2024-12-09 PROCEDURE — 85025 COMPLETE CBC W/AUTO DIFF WBC: CPT

## 2024-12-09 NOTE — PROGRESS NOTES
[facility]                                                                                                                    PRE OP INSTRUCTIONS FOR  Cherrie Weems        Date: 12/9/2024    Date of surgery: 12/13/24   Arrival Time: Hospital will call you between 5pm and 7pm with your final arrival time for surgery. Go to     front of hospital and check in at information desk.    Nothing by mouth (NPO) as instructed. May have clear liquids up to 2 hours prior to surgery. Nothing solid after midnight. Examples: water, apple juice, black coffee, plain tea    Take the following medications with a small sip of water on the morning of Surgery: metoprolol       Aspirin, Ibuprofen, Advil, Naproxen, other Anti-inflammatory products should be stopped before surgery as directed by your surgeon, cardiologist, or primary care Doctor. Herbal supplements and Vitamin E should be stopped five days prior.  May take Tylenol unless instructed otherwise by your surgeon.      Do not smoke, chew tobacco, vape, or use illicit drugs and do not drink any alcoholic beverages 24 hours prior to surgery.    You may brush your teeth the morning of surgery.      You MUST make arrangements for a responsible adult, 18 and over, to take you home after your surgery. You will not be allowed to leave alone or drive yourself home.  You will need someone stay with you the first 24 hrs. Your surgery will be cancelled if you do not have a ride home or someone to stay with you.      Please wear simple, loose fitting clothing to the hospital.  Do not bring valuables (money, credit cards, checkbooks, etc.) Do not wear any makeup (including no eye makeup) or nail polish on your fingers or toes.    DO NOT wear any jewelry or piercings on day of surgery.  All body piercings and jewelry must be removed.    Shower the MORNING of surgery with an Antibacterial soap.    If you have a Living Will and/or Durable Power of  for

## 2024-12-11 ENCOUNTER — ANESTHESIA EVENT (OUTPATIENT)
Dept: OPERATING ROOM | Age: 64
End: 2024-12-11
Payer: COMMERCIAL

## 2024-12-11 LAB
MICROORGANISM SPEC CULT: NORMAL
SERVICE CMNT-IMP: NORMAL
SPECIMEN DESCRIPTION: NORMAL

## 2024-12-11 ASSESSMENT — COPD QUESTIONNAIRES: CAT_SEVERITY: MODERATE

## 2024-12-12 NOTE — ANESTHESIA PRE PROCEDURE
Department of Anesthesiology  Preprocedure Note       Name:  Cherrie Weems   Age:  64 y.o.  :  1960                                          MRN:  [id number]         Date:  2024      Surgeon: Surgeon(s):  Abdi Day MD    Procedure: Procedure(s):  LAPAROSCOPIC POSSIBLE OPEN COLOSTOMY REVERSAL POSSIBLE OSTOMY    Medications prior to admission:   Prior to Admission medications    Medication Sig Start Date End Date Taking? Authorizing Provider   metoprolol succinate (TOPROL XL) 50 MG extended release tablet TAKE 1 TABLET BY MOUTH ONCE DAILY 24   Janette Arellano MD   rosuvastatin (CRESTOR) 10 MG tablet Take 1 tablet by mouth daily 24   Janette Arellano MD   ibuprofen (ADVIL;MOTRIN) 200 MG tablet Take 2 tablets by mouth at bedtime    Froylan Montano MD   ondansetron (ZOFRAN-ODT) 4 MG disintegrating tablet Take 1 tablet by mouth every 8 hours as needed for Nausea or Vomiting 24   Edgardo Martin MD   Multiple Vitamins-Minerals (CENTRUM SILVER 50+WOMEN PO) Take 1 tablet by mouth daily    Froylan Montano MD   vitamin D (ERGOCALCIFEROL) 1.25 MG (29438 UT) CAPS capsule Take 1 capsule by mouth once a week 24   Janette Arellano MD   aspirin 81 MG EC tablet Take 1 tablet by mouth daily    Froylan Montano MD   calcium carbonate 600 MG TABS tablet Take 1 tablet by mouth daily    Froylan Montano MD   vitamin C (ASCORBIC ACID) 500 MG tablet Take 1 tablet by mouth daily    Froylan Montano MD   vitamin B-12 (CYANOCOBALAMIN) 1000 MCG tablet Take 1 tablet by mouth daily    ProviderFroylan MD       Current medications:    No current facility-administered medications for this encounter.     Current Outpatient Medications   Medication Sig Dispense Refill    metoprolol succinate (TOPROL XL) 50 MG extended release tablet TAKE 1 TABLET BY MOUTH ONCE DAILY 90 tablet 1    rosuvastatin (CRESTOR) 10 MG tablet Take 1 tablet by mouth daily 90 tablet 1    ibuprofen

## 2024-12-13 ENCOUNTER — HOSPITAL ENCOUNTER (INPATIENT)
Age: 64
LOS: 2 days | Discharge: HOME | DRG: 337 | End: 2024-12-15
Attending: SURGERY | Admitting: SURGERY
Payer: COMMERCIAL

## 2024-12-13 ENCOUNTER — ANESTHESIA (OUTPATIENT)
Dept: OPERATING ROOM | Age: 64
End: 2024-12-13
Payer: COMMERCIAL

## 2024-12-13 DIAGNOSIS — Z93.3 COLOSTOMY IN PLACE (HCC): ICD-10-CM

## 2024-12-13 DIAGNOSIS — Z98.890 S/P COLOSTOMY TAKEDOWN: Primary | ICD-10-CM

## 2024-12-13 PROBLEM — E78.5 HYPERLIPIDEMIA: Status: ACTIVE | Noted: 2022-08-24

## 2024-12-13 PROCEDURE — 88307 TISSUE EXAM BY PATHOLOGIST: CPT

## 2024-12-13 PROCEDURE — 7100000001 HC PACU RECOVERY - ADDTL 15 MIN: Performed by: SURGERY

## 2024-12-13 PROCEDURE — 2709999900 HC NON-CHARGEABLE SUPPLY: Performed by: SURGERY

## 2024-12-13 PROCEDURE — 0DNU4ZZ RELEASE OMENTUM, PERCUTANEOUS ENDOSCOPIC APPROACH: ICD-10-PCS | Performed by: SURGERY

## 2024-12-13 PROCEDURE — 88304 TISSUE EXAM BY PATHOLOGIST: CPT

## 2024-12-13 PROCEDURE — 2500000003 HC RX 250 WO HCPCS: Performed by: SURGERY

## 2024-12-13 PROCEDURE — 3600000004 HC SURGERY LEVEL 4 BASE: Performed by: SURGERY

## 2024-12-13 PROCEDURE — 6370000000 HC RX 637 (ALT 250 FOR IP): Performed by: SURGERY

## 2024-12-13 PROCEDURE — 2580000003 HC RX 258: Performed by: SURGERY

## 2024-12-13 PROCEDURE — 1200000000 HC SEMI PRIVATE

## 2024-12-13 PROCEDURE — 6360000002 HC RX W HCPCS: Performed by: NURSE ANESTHETIST, CERTIFIED REGISTERED

## 2024-12-13 PROCEDURE — 0DBU0ZZ EXCISION OF OMENTUM, OPEN APPROACH: ICD-10-PCS | Performed by: SURGERY

## 2024-12-13 PROCEDURE — 7100000000 HC PACU RECOVERY - FIRST 15 MIN: Performed by: SURGERY

## 2024-12-13 PROCEDURE — 6360000002 HC RX W HCPCS: Performed by: SURGERY

## 2024-12-13 PROCEDURE — 3700000000 HC ANESTHESIA ATTENDED CARE: Performed by: SURGERY

## 2024-12-13 PROCEDURE — 3700000001 HC ADD 15 MINUTES (ANESTHESIA): Performed by: SURGERY

## 2024-12-13 PROCEDURE — 2720000010 HC SURG SUPPLY STERILE: Performed by: SURGERY

## 2024-12-13 PROCEDURE — 0DSN4ZZ REPOSITION SIGMOID COLON, PERCUTANEOUS ENDOSCOPIC APPROACH: ICD-10-PCS | Performed by: SURGERY

## 2024-12-13 PROCEDURE — 99222 1ST HOSP IP/OBS MODERATE 55: CPT | Performed by: FAMILY MEDICINE

## 2024-12-13 PROCEDURE — 6360000002 HC RX W HCPCS: Performed by: ANESTHESIOLOGY

## 2024-12-13 PROCEDURE — 6370000000 HC RX 637 (ALT 250 FOR IP): Performed by: FAMILY MEDICINE

## 2024-12-13 PROCEDURE — 3600000014 HC SURGERY LEVEL 4 ADDTL 15MIN: Performed by: SURGERY

## 2024-12-13 PROCEDURE — 6370000000 HC RX 637 (ALT 250 FOR IP): Performed by: INTERNAL MEDICINE

## 2024-12-13 PROCEDURE — 88305 TISSUE EXAM BY PATHOLOGIST: CPT

## 2024-12-13 PROCEDURE — 2500000003 HC RX 250 WO HCPCS: Performed by: NURSE ANESTHETIST, CERTIFIED REGISTERED

## 2024-12-13 RX ORDER — MEPERIDINE HYDROCHLORIDE 25 MG/ML
12.5 INJECTION INTRAMUSCULAR; INTRAVENOUS; SUBCUTANEOUS EVERY 5 MIN PRN
Status: DISCONTINUED | OUTPATIENT
Start: 2024-12-13 | End: 2024-12-13 | Stop reason: HOSPADM

## 2024-12-13 RX ORDER — POTASSIUM CHLORIDE 7.45 MG/ML
10 INJECTION INTRAVENOUS PRN
Status: DISCONTINUED | OUTPATIENT
Start: 2024-12-13 | End: 2024-12-15 | Stop reason: HOSPADM

## 2024-12-13 RX ORDER — DIPHENHYDRAMINE HYDROCHLORIDE 50 MG/ML
INJECTION INTRAMUSCULAR; INTRAVENOUS
Status: DISCONTINUED | OUTPATIENT
Start: 2024-12-13 | End: 2024-12-13 | Stop reason: SDUPTHER

## 2024-12-13 RX ORDER — SODIUM CHLORIDE 0.9 % (FLUSH) 0.9 %
5-40 SYRINGE (ML) INJECTION PRN
Status: DISCONTINUED | OUTPATIENT
Start: 2024-12-13 | End: 2024-12-13 | Stop reason: HOSPADM

## 2024-12-13 RX ORDER — METRONIDAZOLE 500 MG/100ML
500 INJECTION, SOLUTION INTRAVENOUS EVERY 8 HOURS
Status: COMPLETED | OUTPATIENT
Start: 2024-12-13 | End: 2024-12-14

## 2024-12-13 RX ORDER — SODIUM CHLORIDE 0.9 % (FLUSH) 0.9 %
5-40 SYRINGE (ML) INJECTION EVERY 12 HOURS SCHEDULED
Status: DISCONTINUED | OUTPATIENT
Start: 2024-12-13 | End: 2024-12-15 | Stop reason: HOSPADM

## 2024-12-13 RX ORDER — ONDANSETRON 2 MG/ML
INJECTION INTRAMUSCULAR; INTRAVENOUS
Status: DISCONTINUED | OUTPATIENT
Start: 2024-12-13 | End: 2024-12-13 | Stop reason: SDUPTHER

## 2024-12-13 RX ORDER — LABETALOL HYDROCHLORIDE 5 MG/ML
10 INJECTION, SOLUTION INTRAVENOUS
Status: DISCONTINUED | OUTPATIENT
Start: 2024-12-13 | End: 2024-12-13 | Stop reason: HOSPADM

## 2024-12-13 RX ORDER — KETOROLAC TROMETHAMINE 15 MG/ML
15 INJECTION, SOLUTION INTRAMUSCULAR; INTRAVENOUS ONCE
Status: DISCONTINUED | OUTPATIENT
Start: 2024-12-13 | End: 2024-12-13 | Stop reason: HOSPADM

## 2024-12-13 RX ORDER — HYDRALAZINE HYDROCHLORIDE 20 MG/ML
10 INJECTION INTRAMUSCULAR; INTRAVENOUS
Status: DISCONTINUED | OUTPATIENT
Start: 2024-12-13 | End: 2024-12-13 | Stop reason: HOSPADM

## 2024-12-13 RX ORDER — PHENYLEPHRINE HCL IN 0.9% NACL 1 MG/10 ML
SYRINGE (ML) INTRAVENOUS
Status: DISCONTINUED | OUTPATIENT
Start: 2024-12-13 | End: 2024-12-13 | Stop reason: SDUPTHER

## 2024-12-13 RX ORDER — KETOROLAC TROMETHAMINE 15 MG/ML
15 INJECTION, SOLUTION INTRAMUSCULAR; INTRAVENOUS EVERY 6 HOURS
Status: COMPLETED | OUTPATIENT
Start: 2024-12-13 | End: 2024-12-14

## 2024-12-13 RX ORDER — METRONIDAZOLE 500 MG/100ML
500 INJECTION, SOLUTION INTRAVENOUS
Status: COMPLETED | OUTPATIENT
Start: 2024-12-13 | End: 2024-12-13

## 2024-12-13 RX ORDER — GLYCOPYRROLATE 0.2 MG/ML
INJECTION INTRAMUSCULAR; INTRAVENOUS
Status: DISCONTINUED | OUTPATIENT
Start: 2024-12-13 | End: 2024-12-13 | Stop reason: SDUPTHER

## 2024-12-13 RX ORDER — SODIUM CHLORIDE 9 MG/ML
INJECTION, SOLUTION INTRAVENOUS PRN
Status: DISCONTINUED | OUTPATIENT
Start: 2024-12-13 | End: 2024-12-13 | Stop reason: HOSPADM

## 2024-12-13 RX ORDER — OXYCODONE HYDROCHLORIDE 5 MG/1
5 TABLET ORAL EVERY 4 HOURS PRN
Status: DISCONTINUED | OUTPATIENT
Start: 2024-12-13 | End: 2024-12-15 | Stop reason: HOSPADM

## 2024-12-13 RX ORDER — ROCURONIUM BROMIDE 10 MG/ML
INJECTION, SOLUTION INTRAVENOUS
Status: DISCONTINUED | OUTPATIENT
Start: 2024-12-13 | End: 2024-12-13 | Stop reason: SDUPTHER

## 2024-12-13 RX ORDER — SODIUM CHLORIDE 0.9 % (FLUSH) 0.9 %
5-40 SYRINGE (ML) INJECTION EVERY 12 HOURS SCHEDULED
Status: DISCONTINUED | OUTPATIENT
Start: 2024-12-13 | End: 2024-12-13 | Stop reason: HOSPADM

## 2024-12-13 RX ORDER — MAGNESIUM SULFATE IN WATER 40 MG/ML
2000 INJECTION, SOLUTION INTRAVENOUS PRN
Status: DISCONTINUED | OUTPATIENT
Start: 2024-12-13 | End: 2024-12-15 | Stop reason: HOSPADM

## 2024-12-13 RX ORDER — MIDAZOLAM HYDROCHLORIDE 1 MG/ML
INJECTION, SOLUTION INTRAMUSCULAR; INTRAVENOUS
Status: DISCONTINUED | OUTPATIENT
Start: 2024-12-13 | End: 2024-12-13 | Stop reason: SDUPTHER

## 2024-12-13 RX ORDER — HALOPERIDOL 5 MG/ML
1 INJECTION INTRAMUSCULAR
Status: DISCONTINUED | OUTPATIENT
Start: 2024-12-13 | End: 2024-12-13 | Stop reason: HOSPADM

## 2024-12-13 RX ORDER — METHOCARBAMOL 500 MG/1
500 TABLET, FILM COATED ORAL 4 TIMES DAILY
Status: DISCONTINUED | OUTPATIENT
Start: 2024-12-13 | End: 2024-12-15 | Stop reason: HOSPADM

## 2024-12-13 RX ORDER — NALOXONE HYDROCHLORIDE 0.4 MG/ML
INJECTION, SOLUTION INTRAMUSCULAR; INTRAVENOUS; SUBCUTANEOUS PRN
Status: DISCONTINUED | OUTPATIENT
Start: 2024-12-13 | End: 2024-12-13 | Stop reason: HOSPADM

## 2024-12-13 RX ORDER — SODIUM CHLORIDE 9 MG/ML
INJECTION, SOLUTION INTRAVENOUS PRN
Status: DISCONTINUED | OUTPATIENT
Start: 2024-12-13 | End: 2024-12-15 | Stop reason: HOSPADM

## 2024-12-13 RX ORDER — NEOSTIGMINE METHYLSULFATE 1 MG/ML
INJECTION, SOLUTION INTRAVENOUS
Status: DISCONTINUED | OUTPATIENT
Start: 2024-12-13 | End: 2024-12-13 | Stop reason: SDUPTHER

## 2024-12-13 RX ORDER — ROSUVASTATIN CALCIUM 10 MG/1
10 TABLET, COATED ORAL DAILY
Status: DISCONTINUED | OUTPATIENT
Start: 2024-12-13 | End: 2024-12-15 | Stop reason: HOSPADM

## 2024-12-13 RX ORDER — PROCHLORPERAZINE EDISYLATE 5 MG/ML
5 INJECTION INTRAMUSCULAR; INTRAVENOUS
Status: DISCONTINUED | OUTPATIENT
Start: 2024-12-13 | End: 2024-12-13 | Stop reason: HOSPADM

## 2024-12-13 RX ORDER — ENOXAPARIN SODIUM 100 MG/ML
40 INJECTION SUBCUTANEOUS DAILY
Status: DISCONTINUED | OUTPATIENT
Start: 2024-12-14 | End: 2024-12-13

## 2024-12-13 RX ORDER — METOPROLOL TARTRATE 1 MG/ML
INJECTION, SOLUTION INTRAVENOUS
Status: DISCONTINUED | OUTPATIENT
Start: 2024-12-13 | End: 2024-12-13 | Stop reason: SDUPTHER

## 2024-12-13 RX ORDER — SODIUM CHLORIDE 9 MG/ML
INJECTION, SOLUTION INTRAVENOUS CONTINUOUS
Status: DISCONTINUED | OUTPATIENT
Start: 2024-12-13 | End: 2024-12-14

## 2024-12-13 RX ORDER — IPRATROPIUM BROMIDE AND ALBUTEROL SULFATE 2.5; .5 MG/3ML; MG/3ML
1 SOLUTION RESPIRATORY (INHALATION)
Status: DISCONTINUED | OUTPATIENT
Start: 2024-12-13 | End: 2024-12-13 | Stop reason: HOSPADM

## 2024-12-13 RX ORDER — ONDANSETRON 4 MG/1
4 TABLET, ORALLY DISINTEGRATING ORAL EVERY 8 HOURS PRN
Status: DISCONTINUED | OUTPATIENT
Start: 2024-12-13 | End: 2024-12-15 | Stop reason: HOSPADM

## 2024-12-13 RX ORDER — LIDOCAINE HYDROCHLORIDE 20 MG/ML
INJECTION, SOLUTION INTRAVENOUS
Status: DISCONTINUED | OUTPATIENT
Start: 2024-12-13 | End: 2024-12-13 | Stop reason: SDUPTHER

## 2024-12-13 RX ORDER — FENTANYL CITRATE 50 UG/ML
INJECTION, SOLUTION INTRAMUSCULAR; INTRAVENOUS
Status: DISCONTINUED | OUTPATIENT
Start: 2024-12-13 | End: 2024-12-13 | Stop reason: SDUPTHER

## 2024-12-13 RX ORDER — POTASSIUM CHLORIDE 1500 MG/1
40 TABLET, EXTENDED RELEASE ORAL PRN
Status: DISCONTINUED | OUTPATIENT
Start: 2024-12-13 | End: 2024-12-15 | Stop reason: HOSPADM

## 2024-12-13 RX ORDER — ENOXAPARIN SODIUM 100 MG/ML
30 INJECTION SUBCUTANEOUS DAILY
Status: DISCONTINUED | OUTPATIENT
Start: 2024-12-14 | End: 2024-12-15 | Stop reason: HOSPADM

## 2024-12-13 RX ORDER — ONDANSETRON 2 MG/ML
4 INJECTION INTRAMUSCULAR; INTRAVENOUS EVERY 6 HOURS PRN
Status: DISCONTINUED | OUTPATIENT
Start: 2024-12-13 | End: 2024-12-15 | Stop reason: HOSPADM

## 2024-12-13 RX ORDER — SODIUM CHLORIDE, SODIUM LACTATE, POTASSIUM CHLORIDE, CALCIUM CHLORIDE 600; 310; 30; 20 MG/100ML; MG/100ML; MG/100ML; MG/100ML
INJECTION, SOLUTION INTRAVENOUS CONTINUOUS
Status: DISCONTINUED | OUTPATIENT
Start: 2024-12-13 | End: 2024-12-13 | Stop reason: HOSPADM

## 2024-12-13 RX ORDER — OXYCODONE HYDROCHLORIDE 5 MG/1
10 TABLET ORAL EVERY 4 HOURS PRN
Status: DISCONTINUED | OUTPATIENT
Start: 2024-12-13 | End: 2024-12-15 | Stop reason: HOSPADM

## 2024-12-13 RX ORDER — METOPROLOL SUCCINATE 50 MG/1
50 TABLET, EXTENDED RELEASE ORAL DAILY
Status: DISCONTINUED | OUTPATIENT
Start: 2024-12-14 | End: 2024-12-15 | Stop reason: HOSPADM

## 2024-12-13 RX ORDER — DEXAMETHASONE SODIUM PHOSPHATE 10 MG/ML
INJECTION, SOLUTION INTRAMUSCULAR; INTRAVENOUS
Status: DISCONTINUED | OUTPATIENT
Start: 2024-12-13 | End: 2024-12-13 | Stop reason: SDUPTHER

## 2024-12-13 RX ORDER — KETOROLAC TROMETHAMINE 30 MG/ML
INJECTION, SOLUTION INTRAMUSCULAR; INTRAVENOUS
Status: DISCONTINUED | OUTPATIENT
Start: 2024-12-13 | End: 2024-12-13 | Stop reason: SDUPTHER

## 2024-12-13 RX ORDER — PROPOFOL 10 MG/ML
INJECTION, EMULSION INTRAVENOUS
Status: DISCONTINUED | OUTPATIENT
Start: 2024-12-13 | End: 2024-12-13 | Stop reason: SDUPTHER

## 2024-12-13 RX ORDER — SODIUM CHLORIDE 0.9 % (FLUSH) 0.9 %
5-40 SYRINGE (ML) INJECTION PRN
Status: DISCONTINUED | OUTPATIENT
Start: 2024-12-13 | End: 2024-12-15 | Stop reason: HOSPADM

## 2024-12-13 RX ORDER — DIPHENHYDRAMINE HYDROCHLORIDE 50 MG/ML
12.5 INJECTION INTRAMUSCULAR; INTRAVENOUS
Status: DISCONTINUED | OUTPATIENT
Start: 2024-12-13 | End: 2024-12-13 | Stop reason: HOSPADM

## 2024-12-13 RX ORDER — BUPIVACAINE HYDROCHLORIDE AND EPINEPHRINE 2.5; 5 MG/ML; UG/ML
INJECTION, SOLUTION EPIDURAL; INFILTRATION; INTRACAUDAL; PERINEURAL PRN
Status: DISCONTINUED | OUTPATIENT
Start: 2024-12-13 | End: 2024-12-13 | Stop reason: ALTCHOICE

## 2024-12-13 RX ORDER — NICOTINE 21 MG/24HR
1 PATCH, TRANSDERMAL 24 HOURS TRANSDERMAL DAILY
Status: DISCONTINUED | OUTPATIENT
Start: 2024-12-13 | End: 2024-12-15 | Stop reason: HOSPADM

## 2024-12-13 RX ADMIN — FENTANYL CITRATE 50 MCG: 50 INJECTION, SOLUTION INTRAMUSCULAR; INTRAVENOUS at 08:45

## 2024-12-13 RX ADMIN — SODIUM CHLORIDE, POTASSIUM CHLORIDE, SODIUM LACTATE AND CALCIUM CHLORIDE: 600; 310; 30; 20 INJECTION, SOLUTION INTRAVENOUS at 06:23

## 2024-12-13 RX ADMIN — PROPOFOL 150 MG: 10 INJECTION, EMULSION INTRAVENOUS at 08:20

## 2024-12-13 RX ADMIN — METOPROLOL TARTRATE 5 MG: 5 INJECTION, SOLUTION INTRAVENOUS at 08:57

## 2024-12-13 RX ADMIN — ONDANSETRON 4 MG: 2 INJECTION, SOLUTION INTRAMUSCULAR; INTRAVENOUS at 10:23

## 2024-12-13 RX ADMIN — SODIUM CHLORIDE, POTASSIUM CHLORIDE, SODIUM LACTATE AND CALCIUM CHLORIDE: 600; 310; 30; 20 INJECTION, SOLUTION INTRAVENOUS at 08:54

## 2024-12-13 RX ADMIN — METRONIDAZOLE 500 MG: 500 SOLUTION INTRAVENOUS at 08:25

## 2024-12-13 RX ADMIN — KETOROLAC TROMETHAMINE 30 MG: 30 INJECTION, SOLUTION INTRAMUSCULAR at 10:29

## 2024-12-13 RX ADMIN — DEXAMETHASONE SODIUM PHOSPHATE 10 MG: 10 INJECTION INTRAMUSCULAR; INTRAVENOUS at 08:26

## 2024-12-13 RX ADMIN — HYDROMORPHONE HYDROCHLORIDE 0.25 MG: 1 INJECTION, SOLUTION INTRAMUSCULAR; INTRAVENOUS; SUBCUTANEOUS at 11:18

## 2024-12-13 RX ADMIN — LIDOCAINE HYDROCHLORIDE 60 MG: 20 INJECTION, SOLUTION INTRAVENOUS at 08:20

## 2024-12-13 RX ADMIN — WATER 1000 MG: 1 INJECTION INTRAMUSCULAR; INTRAVENOUS; SUBCUTANEOUS at 08:28

## 2024-12-13 RX ADMIN — FENTANYL CITRATE 50 MCG: 50 INJECTION, SOLUTION INTRAMUSCULAR; INTRAVENOUS at 08:53

## 2024-12-13 RX ADMIN — HYDROMORPHONE HYDROCHLORIDE 0.25 MG: 1 INJECTION, SOLUTION INTRAMUSCULAR; INTRAVENOUS; SUBCUTANEOUS at 12:54

## 2024-12-13 RX ADMIN — GLYCOPYRROLATE 0.6 MG: 0.2 INJECTION INTRAMUSCULAR; INTRAVENOUS at 10:23

## 2024-12-13 RX ADMIN — DIPHENHYDRAMINE HYDROCHLORIDE 12.5 MG: 50 INJECTION INTRAMUSCULAR; INTRAVENOUS at 08:13

## 2024-12-13 RX ADMIN — Medication 3 MG: at 10:23

## 2024-12-13 RX ADMIN — MEPERIDINE HYDROCHLORIDE 12.5 MG: 25 INJECTION INTRAMUSCULAR; INTRAVENOUS; SUBCUTANEOUS at 11:25

## 2024-12-13 RX ADMIN — KETOROLAC TROMETHAMINE 15 MG: 15 INJECTION, SOLUTION INTRAMUSCULAR; INTRAVENOUS at 16:33

## 2024-12-13 RX ADMIN — ROCURONIUM BROMIDE 35 MG: 10 INJECTION, SOLUTION INTRAVENOUS at 08:20

## 2024-12-13 RX ADMIN — KETOROLAC TROMETHAMINE 15 MG: 15 INJECTION, SOLUTION INTRAMUSCULAR; INTRAVENOUS at 21:25

## 2024-12-13 RX ADMIN — Medication 50 MCG: at 08:41

## 2024-12-13 RX ADMIN — METRONIDAZOLE 500 MG: 500 SOLUTION INTRAVENOUS at 08:09

## 2024-12-13 RX ADMIN — MIDAZOLAM 2 MG: 1 INJECTION INTRAMUSCULAR; INTRAVENOUS at 08:13

## 2024-12-13 RX ADMIN — FENTANYL CITRATE 100 MCG: 50 INJECTION, SOLUTION INTRAMUSCULAR; INTRAVENOUS at 08:20

## 2024-12-13 RX ADMIN — SODIUM CHLORIDE: 9 INJECTION, SOLUTION INTRAVENOUS at 16:34

## 2024-12-13 RX ADMIN — METRONIDAZOLE 500 MG: 500 INJECTION, SOLUTION INTRAVENOUS at 16:30

## 2024-12-13 RX ADMIN — ROSUVASTATIN CALCIUM 10 MG: 10 TABLET, FILM COATED ORAL at 21:25

## 2024-12-13 RX ADMIN — MEPERIDINE HYDROCHLORIDE 12.5 MG: 25 INJECTION INTRAMUSCULAR; INTRAVENOUS; SUBCUTANEOUS at 11:32

## 2024-12-13 RX ADMIN — ROCURONIUM BROMIDE 15 MG: 10 INJECTION, SOLUTION INTRAVENOUS at 09:04

## 2024-12-13 ASSESSMENT — PAIN DESCRIPTION - LOCATION
LOCATION: ABDOMEN

## 2024-12-13 ASSESSMENT — PAIN DESCRIPTION - ORIENTATION
ORIENTATION: LEFT;UPPER
ORIENTATION: RIGHT;LEFT
ORIENTATION: LEFT;UPPER

## 2024-12-13 ASSESSMENT — PAIN SCALES - GENERAL
PAINLEVEL_OUTOF10: 2
PAINLEVEL_OUTOF10: 0
PAINLEVEL_OUTOF10: 6
PAINLEVEL_OUTOF10: 8

## 2024-12-13 ASSESSMENT — LIFESTYLE VARIABLES
SMOKING_STATUS: 1
HOW OFTEN DO YOU HAVE A DRINK CONTAINING ALCOHOL: NEVER

## 2024-12-13 ASSESSMENT — PAIN DESCRIPTION - DESCRIPTORS
DESCRIPTORS: ACHING;DULL
DESCRIPTORS: ACHING

## 2024-12-13 ASSESSMENT — PAIN - FUNCTIONAL ASSESSMENT: PAIN_FUNCTIONAL_ASSESSMENT: NONE - DENIES PAIN

## 2024-12-13 NOTE — H&P
Socioeconomic History    Marital status: Single       Spouse name: Not on file    Number of children: Not on file    Years of education: Not on file    Highest education level: Not on file   Occupational History    Not on file   Tobacco Use    Smoking status: Every Day       Current packs/day: 0.50       Average packs/day: 0.5 packs/day for 40.0 years (20.0 ttl pk-yrs)       Types: Cigarettes    Smokeless tobacco: Never   Substance and Sexual Activity    Alcohol use: Not Currently    Drug use: Never    Sexual activity: Not on file   Other Topics Concern    Not on file   Social History Narrative    Not on file      Social Determinants of Health           Financial Resource Strain: Low Risk  (11/26/2024)     Overall Financial Resource Strain (CARDIA)      Difficulty of Paying Living Expenses: Not hard at all   Food Insecurity: No Food Insecurity (11/26/2024)     Hunger Vital Sign      Worried About Running Out of Food in the Last Year: Never true      Ran Out of Food in the Last Year: Never true   Transportation Needs: No Transportation Needs (11/26/2024)     PRAPARE - Transportation      Lack of Transportation (Medical): No      Lack of Transportation (Non-Medical): No   Physical Activity: Insufficiently Active (11/14/2023)     Exercise Vital Sign      Days of Exercise per Week: 7 days      Minutes of Exercise per Session: 20 min   Stress: No Stress Concern Present (11/14/2023)     [employer] - Occupational Stress Questionnaire      Feeling of Stress : Only a little   Recent Concern: Stress - Stress Concern Present (11/14/2023)     [employer] [employer] - Occupational Stress Questionnaire      Feeling of Stress : To some extent   Social Connections: Moderately Integrated (11/14/2023)     Social Connection and Isolation Panel [NHANES]      Frequency of Communication with Friends and Family: More than three times a week      Frequency of Social Gatherings with Friends

## 2024-12-13 NOTE — OP NOTE
Operative Note      Patient: Cherrie Weems  YOB: 1960  MRN: [id number]    Date of Procedure: 12/13/2024    Pre-Op Diagnosis Codes:      * Colostomy in place (HCC) [Z93.3]    Post-Op Diagnosis: Same       Procedure(s):  LAPAROSCOPIC  COLOSTOMY REVERSAL, omental flap    Surgeon(s):  Abdi Day MD    Assistant:   First Assistant: Moreno Suggs    Anesthesia: Choice    Estimated Blood Loss (mL): 200     Complications: None    Specimens:   ID Type Source Tests Collected by Time Destination   A : Rectum and donuts x 2 Tissue Tissue SURGICAL PATHOLOGY Abdi Day MD 12/13/2024 1000    B : Omentum Tissue Tissue SURGICAL PATHOLOGY Abdi Day MD 12/13/2024 0934    C : colostomy Tissue Tissue SURGICAL PATHOLOGY Abdi Day MD 12/13/2024 0959        Implants:  * No implants in log *      Drains:   Urinary Catheter 12/13/24 Marcus (Active)       [REMOVED] Colostomy LLQ (Removed)       Findings:  Infection Present At Time Of Surgery (PATOS) (choose all levels that have infection present):  No infection present  Other Findings: as dictated     Detailed Description of Procedure:   [id number]    Electronically signed by Abdi Day MD on 12/13/2024 at 10:30 AM

## 2024-12-13 NOTE — PLAN OF CARE
Problem: Discharge Planning  Goal: Discharge to home or other facility with appropriate resources  Outcome: Progressing  Flowsheets (Taken 12/13/2024 1402)  Discharge to home or other facility with appropriate resources: Identify barriers to discharge with patient and caregiver     Problem: Safety - Adult  Goal: Free from fall injury  Outcome: Progressing     Problem: ABCDS Injury Assessment  Goal: Absence of physical injury  Outcome: Progressing

## 2024-12-13 NOTE — ANESTHESIA POSTPROCEDURE EVALUATION
Department of Anesthesiology  Postprocedure Note    Patient: Cherrie Weems  MRN: [id number]  YOB: 1960  Date of evaluation: 12/13/2024    Procedure Summary       Date: 12/13/24 Room / Location: [address]    Anesthesia Start: 0808 Anesthesia Stop: 1044    Procedure: LAPAROSCOPIC  COLOSTOMY REVERSAL (Abdomen) Diagnosis:       Colostomy in place (HCC)      (Colostomy in place (HCC) [Z93.3])    Surgeons: Abdi Day MD Responsible Provider: Philipp Hollis MD    Anesthesia Type: general ASA Status: 3            Anesthesia Type: No value filed.    James Phase I: James Score: 9    James Phase II:      Anesthesia Post Evaluation    Patient location during evaluation: PACU  Patient participation: complete - patient participated  Level of consciousness: awake  Pain score: 4  Airway patency: patent  Nausea & Vomiting: no vomiting and no nausea  Cardiovascular status: hemodynamically stable  Respiratory status: acceptable  Hydration status: stable  Pain management: adequate        No notable events documented.

## 2024-12-13 NOTE — OP NOTE
[address] [address]                            OPERATIVE REPORT      PATIENT NAME: ROB MEJIA                 : 1960  MED REC NO: [id number]                        ROOM: [facility]  ACCOUNT NO: [id number]                       ADMIT DATE: 2024  PROVIDER: Abdi Day MD      DATE OF PROCEDURE:  2024    SURGEON:  Abdi Day MD    PREOPERATIVE DIAGNOSIS:  Colostomy in place.    POSTOPERATIVE DIAGNOSIS:  Colostomy in place.    PROCEDURES:  Colostomy reversal, mobilization and placement of omental flap, rectal resection, partial omentectomy, lysis of dense complex adhesions for over 1 hour.    ASSISTANT:  Christiano Suggs.    ANESTHESIA:  General.    COMPLICATIONS:  None.    FLUIDS:  Crystalloid.    BLOOD LOSS:  200 mL.    DISPOSITION:  The patient will be admit to the floor for routine postoperative care.    INDICATION:  This is a 64-year-old female with the aforementioned diagnosis.  I explained the risks, benefits, potential outcomes, alternative treatment to the aforementioned procedure, and she agreed to proceed understanding those risks and potential outcomes.    SPECIMEN:  Rectum with donuts, omentum and colostomy site.    DESCRIPTION OF PROCEDURE:  The patient was brought to the operative suite, placed under general anesthesia.  Bilateral PCDs placed, was given preoperative antibiotics within 30 minutes of incision, had a Marcus catheter placed, and then was placed in the lithotomy position.  Once this was done, local anesthetic was infiltrated in the right upper quadrant of the abdomen.  A 5 mm incision was made.  A Veress needle was passed in the peritoneum.  CO2 was used to insufflate the abdomen to a pressure of 15 mmHg.  At this time, the Veress needle was removed, and a 5 mm trocar was put in place.  A 5 and a 10 mm trocar were placed in the right lower quadrant of the abdomen, and we were able to

## 2024-12-14 LAB
ALBUMIN SERPL-MCNC: 3.7 G/DL (ref 3.5–5.2)
ALP SERPL-CCNC: 69 U/L (ref 35–104)
ALT SERPL-CCNC: 16 U/L (ref 0–32)
ANION GAP SERPL CALCULATED.3IONS-SCNC: 11 MMOL/L (ref 7–16)
AST SERPL-CCNC: 23 U/L (ref 0–31)
BASOPHILS # BLD: 0.01 K/UL (ref 0–0.2)
BASOPHILS NFR BLD: 0 % (ref 0–2)
BILIRUB SERPL-MCNC: 1 MG/DL (ref 0–1.2)
BUN SERPL-MCNC: 11 MG/DL (ref 6–23)
CALCIUM SERPL-MCNC: 8.6 MG/DL (ref 8.6–10.2)
CHLORIDE SERPL-SCNC: 101 MMOL/L (ref 98–107)
CO2 SERPL-SCNC: 23 MMOL/L (ref 22–29)
CREAT SERPL-MCNC: 0.8 MG/DL (ref 0.5–1)
EOSINOPHIL # BLD: 0 K/UL (ref 0.05–0.5)
EOSINOPHILS RELATIVE PERCENT: 0 % (ref 0–6)
ERYTHROCYTE [DISTWIDTH] IN BLOOD BY AUTOMATED COUNT: 12.7 % (ref 11.5–15)
GFR, ESTIMATED: 88 ML/MIN/1.73M2
GLUCOSE SERPL-MCNC: 129 MG/DL (ref 74–99)
HCT VFR BLD AUTO: 32.4 % (ref 34–48)
HGB BLD-MCNC: 11.2 G/DL (ref 11.5–15.5)
IMM GRANULOCYTES # BLD AUTO: 0.04 K/UL (ref 0–0.58)
IMM GRANULOCYTES NFR BLD: 0 % (ref 0–5)
LYMPHOCYTES NFR BLD: 0.72 K/UL (ref 1.5–4)
LYMPHOCYTES RELATIVE PERCENT: 7 % (ref 20–42)
MCH RBC QN AUTO: 32.4 PG (ref 26–35)
MCHC RBC AUTO-ENTMCNC: 34.6 G/DL (ref 32–34.5)
MCV RBC AUTO: 93.6 FL (ref 80–99.9)
MONOCYTES NFR BLD: 0.5 K/UL (ref 0.1–0.95)
MONOCYTES NFR BLD: 5 % (ref 2–12)
NEUTROPHILS NFR BLD: 88 % (ref 43–80)
NEUTS SEG NFR BLD: 9.28 K/UL (ref 1.8–7.3)
PLATELET # BLD AUTO: 195 K/UL (ref 130–450)
PMV BLD AUTO: 10.4 FL (ref 7–12)
POTASSIUM SERPL-SCNC: 4.5 MMOL/L (ref 3.5–5)
PROT SERPL-MCNC: 6.2 G/DL (ref 6.4–8.3)
RBC # BLD AUTO: 3.46 M/UL (ref 3.5–5.5)
SODIUM SERPL-SCNC: 135 MMOL/L (ref 132–146)
WBC OTHER # BLD: 10.6 K/UL (ref 4.5–11.5)

## 2024-12-14 PROCEDURE — 6360000002 HC RX W HCPCS: Performed by: SURGERY

## 2024-12-14 PROCEDURE — 85025 COMPLETE CBC W/AUTO DIFF WBC: CPT

## 2024-12-14 PROCEDURE — 99232 SBSQ HOSP IP/OBS MODERATE 35: CPT | Performed by: STUDENT IN AN ORGANIZED HEALTH CARE EDUCATION/TRAINING PROGRAM

## 2024-12-14 PROCEDURE — 1200000000 HC SEMI PRIVATE

## 2024-12-14 PROCEDURE — 6370000000 HC RX 637 (ALT 250 FOR IP): Performed by: FAMILY MEDICINE

## 2024-12-14 PROCEDURE — 6370000000 HC RX 637 (ALT 250 FOR IP): Performed by: SURGERY

## 2024-12-14 PROCEDURE — 36415 COLL VENOUS BLD VENIPUNCTURE: CPT

## 2024-12-14 PROCEDURE — 80053 COMPREHEN METABOLIC PANEL: CPT

## 2024-12-14 PROCEDURE — 6370000000 HC RX 637 (ALT 250 FOR IP): Performed by: INTERNAL MEDICINE

## 2024-12-14 RX ORDER — ACETAMINOPHEN 325 MG/1
650 TABLET ORAL EVERY 6 HOURS PRN
Status: DISCONTINUED | OUTPATIENT
Start: 2024-12-14 | End: 2024-12-15 | Stop reason: HOSPADM

## 2024-12-14 RX ADMIN — KETOROLAC TROMETHAMINE 15 MG: 15 INJECTION, SOLUTION INTRAMUSCULAR; INTRAVENOUS at 09:34

## 2024-12-14 RX ADMIN — METOPROLOL SUCCINATE 50 MG: 50 TABLET, EXTENDED RELEASE ORAL at 09:19

## 2024-12-14 RX ADMIN — KETOROLAC TROMETHAMINE 15 MG: 15 INJECTION, SOLUTION INTRAMUSCULAR; INTRAVENOUS at 05:19

## 2024-12-14 RX ADMIN — ROSUVASTATIN CALCIUM 10 MG: 10 TABLET, FILM COATED ORAL at 20:45

## 2024-12-14 RX ADMIN — METRONIDAZOLE 500 MG: 500 INJECTION, SOLUTION INTRAVENOUS at 01:04

## 2024-12-14 RX ADMIN — ENOXAPARIN SODIUM 30 MG: 100 INJECTION SUBCUTANEOUS at 09:19

## 2024-12-14 RX ADMIN — METHOCARBAMOL TABLETS 500 MG: 500 TABLET, COATED ORAL at 20:45

## 2024-12-14 RX ADMIN — KETOROLAC TROMETHAMINE 15 MG: 15 INJECTION, SOLUTION INTRAMUSCULAR; INTRAVENOUS at 16:04

## 2024-12-14 ASSESSMENT — PAIN DESCRIPTION - PAIN TYPE: TYPE: SURGICAL PAIN

## 2024-12-14 ASSESSMENT — PAIN DESCRIPTION - DESCRIPTORS
DESCRIPTORS: DISCOMFORT
DESCRIPTORS: DISCOMFORT;SORE;TENDER

## 2024-12-14 ASSESSMENT — PAIN DESCRIPTION - ONSET: ONSET: GRADUAL

## 2024-12-14 ASSESSMENT — PAIN DESCRIPTION - LOCATION
LOCATION: ABDOMEN
LOCATION: ABDOMEN

## 2024-12-14 ASSESSMENT — PAIN DESCRIPTION - FREQUENCY: FREQUENCY: INTERMITTENT

## 2024-12-14 ASSESSMENT — PAIN SCALES - GENERAL
PAINLEVEL_OUTOF10: 2
PAINLEVEL_OUTOF10: 4
PAINLEVEL_OUTOF10: 5

## 2024-12-14 ASSESSMENT — PAIN DESCRIPTION - ORIENTATION: ORIENTATION: INNER

## 2024-12-14 ASSESSMENT — PAIN - FUNCTIONAL ASSESSMENT: PAIN_FUNCTIONAL_ASSESSMENT: ACTIVITIES ARE NOT PREVENTED

## 2024-12-14 NOTE — PLAN OF CARE
Problem: Discharge Planning  Goal: Discharge to home or other facility with appropriate resources  12/14/2024 1105 by Michelle Ellington RN  Outcome: Progressing  12/13/2024 2311 by Monica Smith RN  Outcome: Progressing     Problem: Safety - Adult  Goal: Free from fall injury  12/14/2024 1105 by Michelle Ellington RN  Outcome: Progressing  12/13/2024 2311 by Monica Smith RN  Outcome: Progressing     Problem: ABCDS Injury Assessment  Goal: Absence of physical injury  12/14/2024 1105 by Michelle Ellington RN  Outcome: Progressing  12/13/2024 2311 by Monica Smith RN  Outcome: Progressing

## 2024-12-14 NOTE — CONSULTS
[facility]   Consult for Medical Management      Reason for Consult:  Medical management    History of Present Illness:  64 y.o. female with a history of HTN, HLD presents for elective ostomy reversal.  Pre-op workup on 12/9 unremarkable labs.  Chart review shows patient was admitted in June with perforated viscous and had ex-lap with sigmoid resection and end colostomy.  Patient reports her abdominal pain is currently well controlled, but she is having hot flashes which she attributes to menopause and feels the heat in the room is too hot and requests the thermostat be turned down.    Informant(s) for H&P:  patient, chart    REVIEW OF SYSTEMS:  Complete ROS performed with patient, pertinent positives and negatives are listed in the HPI.    PMH:  Past Medical History:   Diagnosis Date    Diverticulitis     Hyperlipidemia     Hypertension     Osteoporosis        Surgical History:  Past Surgical History:   Procedure Laterality Date    SMALL INTESTINE SURGERY N/A 7/1/2024    EXPLORATORY LAPAROTOMY SIGMOID COLON RESECTION POSS COLOSTOMY performed by Abdi Day MD at [facility]       Medications Prior to Admission:    Prior to Admission medications    Medication Sig Start Date End Date Taking? Authorizing Provider   metoprolol succinate (TOPROL XL) 50 MG extended release tablet TAKE 1 TABLET BY MOUTH ONCE DAILY 11/26/24  Yes Janette Arellano MD   rosuvastatin (CRESTOR) 10 MG tablet Take 1 tablet by mouth daily 11/26/24  Yes Janette Arellano MD   ondansetron (ZOFRAN-ODT) 4 MG disintegrating tablet Take 1 tablet by mouth every 8 hours as needed for Nausea or Vomiting 7/13/24  Yes Edgardo Martin MD   Multiple Vitamins-Minerals (CENTRUM SILVER 50+WOMEN PO) Take 1 tablet by mouth daily   Yes Froylan Montano MD   aspirin 81 MG EC tablet Take 1 tablet by mouth daily   Yes Froylan Montano MD   calcium carbonate 600 MG TABS tablet Take 1 tablet by mouth daily

## 2024-12-14 NOTE — PLAN OF CARE
Problem: Discharge Planning  Goal: Discharge to home or other facility with appropriate resources  12/13/2024 2311 by Monica Smith, RN  Outcome: Progressing     Problem: Safety - Adult  Goal: Free from fall injury  12/13/2024 2311 by Monica Smith, RN  Outcome: Progressing     Problem: ABCDS Injury Assessment  Goal: Absence of physical injury  12/13/2024 2311 by Monica Smith, RN  Outcome: Progressing

## 2024-12-15 VITALS
DIASTOLIC BLOOD PRESSURE: 74 MMHG | HEIGHT: 65 IN | OXYGEN SATURATION: 98 % | TEMPERATURE: 97.9 F | RESPIRATION RATE: 15 BRPM | HEART RATE: 63 BPM | SYSTOLIC BLOOD PRESSURE: 155 MMHG | BODY MASS INDEX: 19.68 KG/M2 | WEIGHT: 118.1 LBS

## 2024-12-15 PROCEDURE — 6370000000 HC RX 637 (ALT 250 FOR IP): Performed by: INTERNAL MEDICINE

## 2024-12-15 PROCEDURE — 99232 SBSQ HOSP IP/OBS MODERATE 35: CPT | Performed by: STUDENT IN AN ORGANIZED HEALTH CARE EDUCATION/TRAINING PROGRAM

## 2024-12-15 PROCEDURE — 2580000003 HC RX 258: Performed by: SURGERY

## 2024-12-15 PROCEDURE — 6370000000 HC RX 637 (ALT 250 FOR IP): Performed by: SURGERY

## 2024-12-15 PROCEDURE — 6360000002 HC RX W HCPCS: Performed by: SURGERY

## 2024-12-15 PROCEDURE — 6370000000 HC RX 637 (ALT 250 FOR IP): Performed by: FAMILY MEDICINE

## 2024-12-15 RX ORDER — OXYCODONE HYDROCHLORIDE 5 MG/1
5 TABLET ORAL EVERY 6 HOURS PRN
Qty: 12 TABLET | Refills: 0 | Status: SHIPPED | OUTPATIENT
Start: 2024-12-15 | End: 2024-12-18

## 2024-12-15 RX ORDER — IBUPROFEN 800 MG/1
800 TABLET, FILM COATED ORAL EVERY 6 HOURS PRN
Qty: 20 TABLET | Refills: 0 | Status: SHIPPED | OUTPATIENT
Start: 2024-12-15

## 2024-12-15 RX ORDER — METHOCARBAMOL 500 MG/1
500 TABLET, FILM COATED ORAL 4 TIMES DAILY
Qty: 40 TABLET | Refills: 0 | Status: SHIPPED | OUTPATIENT
Start: 2024-12-15 | End: 2024-12-25

## 2024-12-15 RX ADMIN — ACETAMINOPHEN 650 MG: 325 TABLET ORAL at 09:23

## 2024-12-15 RX ADMIN — ENOXAPARIN SODIUM 30 MG: 100 INJECTION SUBCUTANEOUS at 09:18

## 2024-12-15 RX ADMIN — SODIUM CHLORIDE, PRESERVATIVE FREE 10 ML: 5 INJECTION INTRAVENOUS at 09:18

## 2024-12-15 RX ADMIN — METOPROLOL SUCCINATE 50 MG: 50 TABLET, EXTENDED RELEASE ORAL at 09:18

## 2024-12-15 RX ADMIN — ACETAMINOPHEN 650 MG: 325 TABLET ORAL at 00:23

## 2024-12-15 ASSESSMENT — PAIN SCALES - GENERAL: PAINLEVEL_OUTOF10: 3

## 2024-12-15 ASSESSMENT — PAIN DESCRIPTION - DESCRIPTORS: DESCRIPTORS: DISCOMFORT;ACHING;TENDER

## 2024-12-15 ASSESSMENT — PAIN DESCRIPTION - LOCATION: LOCATION: ABDOMEN

## 2024-12-15 ASSESSMENT — PAIN DESCRIPTION - ORIENTATION: ORIENTATION: INNER

## 2024-12-15 NOTE — PROGRESS NOTES
[facility] Hospitalist Progress Note    Admitting Date and Time: 12/13/2024  5:51 AM  Admit Dx: Colostomy in place (HCC) [Z93.3]  S/P colostomy takedown [Z98.890]    Subjective:  Patient is being followed for Colostomy in place (HCC) [Z93.3]  S/P colostomy takedown [Z98.890]     No acute events overnight.  Patient is postoperative day 2 and her blood pressure is well-controlled this morning.  Patient tolerates regular diet and will be discharged home today.    ROS: denies fever, chills, cp, sob, n/v, HA unless stated above.      sodium chloride flush  5-40 mL IntraVENous 2 times per day    methocarbamol  500 mg Oral 4x Daily    enoxaparin  30 mg SubCUTAneous Daily    nicotine  1 patch TransDERmal Daily    metoprolol succinate  50 mg Oral Daily    rosuvastatin  10 mg Oral Daily     acetaminophen, 650 mg, Q6H PRN  sodium chloride flush, 5-40 mL, PRN  sodium chloride, , PRN  potassium chloride, 40 mEq, PRN   Or  potassium alternative oral replacement, 40 mEq, PRN   Or  potassium chloride, 10 mEq, PRN  magnesium sulfate, 2,000 mg, PRN  ondansetron, 4 mg, Q8H PRN   Or  ondansetron, 4 mg, Q6H PRN  HYDROmorphone, 0.25 mg, Q3H PRN   Or  HYDROmorphone, 0.5 mg, Q3H PRN  oxyCODONE, 5 mg, Q4H PRN   Or  oxyCODONE, 10 mg, Q4H PRN         Objective:    BP (!) 155/74   Pulse 63   Temp 97.9 °F (36.6 °C) (Oral)   Resp 15   Ht 1.651 m (5' 5\")   Wt 53.6 kg (118 lb 1.6 oz)   SpO2 98%   BMI 19.65 kg/m²     General Appearance: alert and oriented to person, place and time and in no acute distress  Skin: warm and dry.  Incision site looks clean and dry.  Head: normocephalic and atraumatic  Eyes: pupils equal, round, and reactive to light, extraocular eye movements intact, conjunctivae normal  Neck: neck supple and non tender without mass   Pulmonary/Chest: clear to auscultation bilaterally- no wheezes, rales or rhonchi, normal air movement, no respiratory distress  Cardiovascular: normal rate, normal S1 and S2 and no carotid 
Surgery Progress Note            Chief complaint:   No chief complaint on file.     Patient Active Problem List   Diagnosis    Primary hypertension    Hyperlipidemia    Vitamin D deficiency    Panlobular emphysema (HCC)    Perforated viscus    Pneumoperitoneum    Acute hyponatremia    Peritonitis (HCC)    Hypokalemia    Hyponatremia    Elevated platelet count    Anemia associated with acute blood loss    Colostomy in place (HCC)    S/P colostomy takedown       S: feeling well, tolerating diet     O:   Vitals:    12/14/24 0504   BP: (!) 151/68   Pulse: 64   Resp: 17   Temp: 97.9 °F (36.6 °C)   SpO2: 99%       Intake/Output Summary (Last 24 hours) at 12/14/2024 0655  Last data filed at 12/14/2024 0524  Gross per 24 hour   Intake 2340 ml   Output 1660 ml   Net 680 ml           Labs:  Lab Results   Component Value Date/Time    WBC 10.6 12/14/2024 03:39 AM    WBC 5.4 12/09/2024 02:06 PM    WBC 8.0 11/19/2024 09:15 AM    HGB 11.2 12/14/2024 03:39 AM    HGB 12.9 12/09/2024 02:06 PM    HGB 14.4 11/19/2024 09:15 AM    HCT 32.4 12/14/2024 03:39 AM    HCT 38.4 12/09/2024 02:06 PM    HCT 42.7 11/19/2024 09:15 AM     Lab Results   Component Value Date    CREATININE 0.8 12/14/2024    BUN 11 12/14/2024     12/14/2024    K 4.5 12/14/2024     12/14/2024    CO2 23 12/14/2024     No results found for: \"LIPASE\", \"AMYLASE\"      Physical exam:   BP (!) 151/68   Pulse 64   Temp 97.9 °F (36.6 °C) (Oral)   Resp 17   Ht 1.651 m (5' 5\")   Wt 53.6 kg (118 lb 1.6 oz)   SpO2 99%   BMI 19.65 kg/m²   General appearance: NAD  Head: NCAT  Neck: supple, no masses  Lungs: equal chest rise bilateral  Heart: S1S2 present  Abdomen: soft, mildly tender, non distended,  Incisions c/d/I and bandaged  Skin; no lesions  Gu: no cva tenderness  Extremities: extremities normal, atraumatic, no cyanosis or edema    A:  POD # 1 Colostomy reversal, mobilization and placement of omental flap, rectal resection, partial omentectomy, lysis of dense 
sounds, no masses or organomegaly  Extremities: no cyanosis, no clubbing and no edema  Neurologic: no cranial nerve deficit and speech normal        Recent Labs     12/14/24  0339      K 4.5      CO2 23   BUN 11   CREATININE 0.8   GLUCOSE 129*   CALCIUM 8.6       Recent Labs     12/14/24  0339   WBC 10.6   RBC 3.46*   HGB 11.2*   HCT 32.4*   MCV 93.6   MCH 32.4   MCHC 34.6*   RDW 12.7      MPV 10.4       Radiology:     Assessment:    Principal Problem:    Colostomy in place (HCC)  Active Problems:    Hyperlipidemia    S/P colostomy takedown  Resolved Problems:    * No resolved hospital problems. *      Plan:    S/p ostomy reversal  History of viscus perforation s/p exploratory laparotomy with sigmoid resection and end colostomy  -Advance diet as per surgery team, patient tolerates full liquid diet.  -Management as per surgery team    Chronic hypertension  -Continue home metoprolol with holding parameters    Chronic hyperlipidemia  -Continue rosuvastatin  -Aspirin is on hold until resumed by surgery.    VTE prophylaxis: Enoxaparin      NOTE: This report was transcribed using voice recognition software. Every effort was made to ensure accuracy; however, inadvertent computerized transcription errors may be present.  Electronically signed by Lila Pedro MD on 12/14/2024 at 8:13 AM

## 2024-12-15 NOTE — DISCHARGE SUMMARY
Physician Discharge Summary     Cherrie Weems  [id number]    Admit date: 12/13/2024    Discharge date and time: No discharge date for patient encounter.     Admitting Physician: Abdi Day MD     Admission Diagnoses: Colostomy in place (HCC) [Z93.3]  S/P colostomy takedown [Z98.890]    Discharge diagnosis: same    Condition at discharge: stable        Hospital Course: Had uncomplicated lap colostomy takedown and uncomplicated post operative course and was discharged tolerating a general diet, having good bowel function, ambulating independently and with adequate analgesia.    Discharge Exam: BP (!) 155/74   Pulse 63   Temp 97.9 °F (36.6 °C) (Oral)   Resp 15   Ht 1.651 m (5' 5\")   Wt 53.6 kg (118 lb 1.6 oz)   SpO2 98%   BMI 19.65 kg/m²     General appearance: alert, appears stated age and cooperative  Head: Normocephalic, without obvious abnormality, atraumatic  Neck: no adenopathy, no carotid bruit, no JVD, supple, symmetrical, trachea midline and thyroid not enlarged, symmetric, no tenderness/mass/nodules  Lungs: clear to auscultation bilaterally  Heart: regular rate and rhythm, S1, S2 normal, no murmur, click, rub or gallop  Abdomen: soft, non-tender; bowel sounds normal; no masses,  no organomegaly and incisions clean and dry  Extremities: extremities normal, atraumatic, no cyanosis or edema      Disposition: home    Patient Instructions:     Activity: activity as tolerated  Diet: regular diet  Wound Care: keep wound clean and dry    Follow-up with Dr. Day in 2 weeks.  Over 30 min spent preparing discharge and discussing it and follow up instructions with patient.  Signed:  Abdi Day MD  12/15/2024  9:51 AM

## 2024-12-15 NOTE — PLAN OF CARE
Problem: Discharge Planning  Goal: Discharge to home or other facility with appropriate resources  12/14/2024 2110 by Jaylyn Ayers RN  Outcome: Progressing  12/14/2024 1105 by Michelle Ellington RN  Outcome: Progressing     Problem: Safety - Adult  Goal: Free from fall injury  12/14/2024 2110 by Jaylyn Ayers RN  Outcome: Progressing  12/14/2024 1105 by Michelle Ellington RN  Outcome: Progressing     Problem: ABCDS Injury Assessment  Goal: Absence of physical injury  12/14/2024 2110 by Jaylyn Ayers RN  Outcome: Progressing  12/14/2024 1105 by Michelle Ellington RN  Outcome: Progressing

## 2024-12-15 NOTE — PLAN OF CARE
Problem: Discharge Planning  Goal: Discharge to home or other facility with appropriate resources  12/15/2024 1038 by Divine Artis RN  Outcome: Progressing     Problem: Safety - Adult  Goal: Free from fall injury  12/15/2024 1038 by Divine Artis RN  Outcome: Progressing     Problem: ABCDS Injury Assessment  Goal: Absence of physical injury  12/15/2024 1038 by Divine Artis RN  Outcome: Progressing

## 2024-12-15 NOTE — DISCHARGE INSTRUCTIONS
during your admission and were returned to you:    Belongings  Dental Appliances: None  Vision - Corrective Lenses: Eyeglasses  Hearing Aid: None  Clothing: Footwear, Pants, Shirt, Undergarments, At bedside, Jacket/Coat  Jewelry: None  Body Piercings Removed: No  Electronic Devices: Cell Phone,   Weapons (Notify Protective Services/Security): None  Other Valuables: Keys, Purse  Home Medications: None  Valuables Given To: Family (Comment)  Provide Name(s) of Who Valuable(s) Were Given To: candice  Responsible person(s) in the waiting room: candice  Patient approves for provider to speak to responsible person post operatively: Yes    Information obtained by:  By signing below, I understand that if any problems occur once I leave the hospital I am to contact Dr. Day.  I understand and acknowledge receipt of the instructions indicated above.

## 2024-12-18 LAB — SURGICAL PATHOLOGY REPORT: NORMAL

## 2025-01-15 ENCOUNTER — OFFICE VISIT (OUTPATIENT)
Dept: SURGERY | Age: 65
End: 2025-01-15

## 2025-01-15 VITALS
HEART RATE: 50 BPM | BODY MASS INDEX: 19.66 KG/M2 | DIASTOLIC BLOOD PRESSURE: 70 MMHG | WEIGHT: 118 LBS | SYSTOLIC BLOOD PRESSURE: 136 MMHG | TEMPERATURE: 97.2 F | HEIGHT: 65 IN

## 2025-01-15 DIAGNOSIS — Z98.890 S/P COLOSTOMY TAKEDOWN: Primary | ICD-10-CM

## 2025-01-15 PROCEDURE — 99024 POSTOP FOLLOW-UP VISIT: CPT | Performed by: SURGERY

## 2025-02-18 NOTE — PROGRESS NOTES
Cherrie Weems (:  1960) is a 64 y.o. female,Established patient, here for evaluation of the following chief complaint(s):  3 Month Follow-Up and Health Maintenance (Dexa scan and mammogram pended /)      Subjective   SUBJECTIVE/OBJECTIVE:  HPI:  Hyperlipidemia:  Patient is here to follow up regarding chronic hyperlipidemia.  This is  generally controlled.  Treatment includes crestor  Patient is  compliant with lifestyle modifications.  Patient is not a smoker.  Most recent labs reviewed with patient today and are not remarkable.  Comorbid conditions include obesity.    Hypertension:  Patient is here for follow up chronic hypertension.  This is  generally controlled on current medication regimen.    BP Readings from Last 1 Encounters:   25 138/72        Takes meds as directed and tolerates them well.  Most recent labs reviewed with patient and are not remarkable.  No symptoms from htn standpoint per ROS.  Patient is  compliant with lifestyle modifications.  Patient does not smoke.  Comorbid conditions include obesity     Review of Systems   Constitutional:  Negative for activity change, appetite change, fever and unexpected weight change.   HENT:  Negative for congestion, ear pain, hearing loss, sinus pain, sore throat, tinnitus and trouble swallowing.    Eyes:  Negative for photophobia, discharge, itching and visual disturbance.   Respiratory:  Negative for apnea, cough, shortness of breath and wheezing.    Cardiovascular:  Negative for chest pain, palpitations and leg swelling.   Gastrointestinal:  Negative for abdominal distention, abdominal pain, blood in stool, constipation, diarrhea, nausea and vomiting.   Endocrine: Negative for cold intolerance, polydipsia and polyuria.   Genitourinary:  Negative for difficulty urinating, dysuria, frequency and pelvic pain.   Musculoskeletal:  Negative for arthralgias, back pain, joint swelling, myalgias, neck pain and neck stiffness.   Skin:  Negative for rash

## 2025-03-04 ENCOUNTER — OFFICE VISIT (OUTPATIENT)
Dept: PRIMARY CARE CLINIC | Age: 65
End: 2025-03-04
Payer: COMMERCIAL

## 2025-03-04 VITALS
HEART RATE: 56 BPM | HEIGHT: 65 IN | TEMPERATURE: 97.2 F | BODY MASS INDEX: 18.66 KG/M2 | SYSTOLIC BLOOD PRESSURE: 138 MMHG | WEIGHT: 112 LBS | DIASTOLIC BLOOD PRESSURE: 72 MMHG | OXYGEN SATURATION: 100 %

## 2025-03-04 DIAGNOSIS — I10 PRIMARY HYPERTENSION: ICD-10-CM

## 2025-03-04 DIAGNOSIS — Z12.31 BREAST CANCER SCREENING BY MAMMOGRAM: ICD-10-CM

## 2025-03-04 DIAGNOSIS — M81.0 AGE-RELATED OSTEOPOROSIS WITHOUT CURRENT PATHOLOGICAL FRACTURE: ICD-10-CM

## 2025-03-04 DIAGNOSIS — Z87.891 PERSONAL HISTORY OF TOBACCO USE: ICD-10-CM

## 2025-03-04 DIAGNOSIS — E78.2 MIXED HYPERLIPIDEMIA: Primary | ICD-10-CM

## 2025-03-04 PROCEDURE — 4004F PT TOBACCO SCREEN RCVD TLK: CPT | Performed by: INTERNAL MEDICINE

## 2025-03-04 PROCEDURE — 3078F DIAST BP <80 MM HG: CPT | Performed by: INTERNAL MEDICINE

## 2025-03-04 PROCEDURE — 1123F ACP DISCUSS/DSCN MKR DOCD: CPT | Performed by: INTERNAL MEDICINE

## 2025-03-04 PROCEDURE — 3017F COLORECTAL CA SCREEN DOC REV: CPT | Performed by: INTERNAL MEDICINE

## 2025-03-04 PROCEDURE — G8427 DOCREV CUR MEDS BY ELIG CLIN: HCPCS | Performed by: INTERNAL MEDICINE

## 2025-03-04 PROCEDURE — 1090F PRES/ABSN URINE INCON ASSESS: CPT | Performed by: INTERNAL MEDICINE

## 2025-03-04 PROCEDURE — 99214 OFFICE O/P EST MOD 30 MIN: CPT | Performed by: INTERNAL MEDICINE

## 2025-03-04 PROCEDURE — G0557 PR ADV PRIM CARE MGMT LVL 2: HCPCS | Performed by: INTERNAL MEDICINE

## 2025-03-04 PROCEDURE — G8400 PT W/DXA NO RESULTS DOC: HCPCS | Performed by: INTERNAL MEDICINE

## 2025-03-04 PROCEDURE — G8420 CALC BMI NORM PARAMETERS: HCPCS | Performed by: INTERNAL MEDICINE

## 2025-03-04 PROCEDURE — 3075F SYST BP GE 130 - 139MM HG: CPT | Performed by: INTERNAL MEDICINE

## 2025-03-04 RX ORDER — METOPROLOL SUCCINATE 50 MG/1
TABLET, EXTENDED RELEASE ORAL
Qty: 90 TABLET | Refills: 1 | Status: SHIPPED | OUTPATIENT
Start: 2025-03-04

## 2025-03-04 RX ORDER — ROSUVASTATIN CALCIUM 10 MG/1
10 TABLET, COATED ORAL DAILY
Qty: 90 TABLET | Refills: 1 | Status: SHIPPED | OUTPATIENT
Start: 2025-03-04

## 2025-03-04 RX ORDER — ERGOCALCIFEROL 1.25 MG/1
50000 CAPSULE, LIQUID FILLED ORAL WEEKLY
Qty: 12 CAPSULE | Refills: 1 | Status: SHIPPED | OUTPATIENT
Start: 2025-03-04

## 2025-03-04 ASSESSMENT — PATIENT HEALTH QUESTIONNAIRE - PHQ9
SUM OF ALL RESPONSES TO PHQ QUESTIONS 1-9: 0
2. FEELING DOWN, DEPRESSED OR HOPELESS: NOT AT ALL
SUM OF ALL RESPONSES TO PHQ QUESTIONS 1-9: 0

## 2025-05-23 DIAGNOSIS — R73.01 IMPAIRED FASTING GLUCOSE: ICD-10-CM

## 2025-05-23 DIAGNOSIS — I10 PRIMARY HYPERTENSION: ICD-10-CM

## 2025-05-23 DIAGNOSIS — E78.2 MIXED HYPERLIPIDEMIA: Primary | ICD-10-CM

## 2025-06-12 ENCOUNTER — HOSPITAL ENCOUNTER (OUTPATIENT)
Age: 65
Discharge: HOME | End: 2025-06-12
Payer: COMMERCIAL

## 2025-06-12 ENCOUNTER — HOSPITAL ENCOUNTER (OUTPATIENT)
Dept: MAMMOGRAPHY | Age: 65
Discharge: HOME | End: 2025-06-14
Payer: COMMERCIAL

## 2025-06-12 ENCOUNTER — RESULTS FOLLOW-UP (OUTPATIENT)
Dept: PRIMARY CARE CLINIC | Age: 65
End: 2025-06-12

## 2025-06-12 VITALS — HEIGHT: 66 IN | WEIGHT: 112 LBS | BODY MASS INDEX: 18 KG/M2

## 2025-06-12 DIAGNOSIS — Z12.31 ENCOUNTER FOR SCREENING MAMMOGRAM FOR MALIGNANT NEOPLASM OF BREAST: ICD-10-CM

## 2025-06-12 DIAGNOSIS — E78.2 MIXED HYPERLIPIDEMIA: ICD-10-CM

## 2025-06-12 DIAGNOSIS — I10 PRIMARY HYPERTENSION: ICD-10-CM

## 2025-06-12 DIAGNOSIS — M81.0 AGE-RELATED OSTEOPOROSIS WITHOUT CURRENT PATHOLOGICAL FRACTURE: ICD-10-CM

## 2025-06-12 DIAGNOSIS — R73.01 IMPAIRED FASTING GLUCOSE: ICD-10-CM

## 2025-06-12 LAB
ALBUMIN SERPL-MCNC: 4.2 G/DL (ref 3.5–5.2)
ALP SERPL-CCNC: 83 U/L (ref 35–104)
ALT SERPL-CCNC: 20 U/L (ref 0–35)
ANION GAP SERPL CALCULATED.3IONS-SCNC: 10 MMOL/L (ref 7–16)
AST SERPL-CCNC: 23 U/L (ref 0–35)
BASOPHILS # BLD: 0.03 K/UL (ref 0–0.2)
BASOPHILS NFR BLD: 0 % (ref 0–2)
BILIRUB SERPL-MCNC: 1 MG/DL (ref 0–1.2)
BUN SERPL-MCNC: 17 MG/DL (ref 8–23)
CALCIUM SERPL-MCNC: 9.7 MG/DL (ref 8.8–10.2)
CHLORIDE SERPL-SCNC: 103 MMOL/L (ref 98–107)
CHOLEST SERPL-MCNC: 175 MG/DL
CO2 SERPL-SCNC: 25 MMOL/L (ref 22–29)
CREAT SERPL-MCNC: 0.8 MG/DL (ref 0.5–1)
EOSINOPHIL # BLD: 0.26 K/UL (ref 0.05–0.5)
EOSINOPHILS RELATIVE PERCENT: 4 % (ref 0–6)
ERYTHROCYTE [DISTWIDTH] IN BLOOD BY AUTOMATED COUNT: 12.8 % (ref 11.5–15)
GFR, ESTIMATED: 79 ML/MIN/1.73M2
GLUCOSE SERPL-MCNC: 101 MG/DL (ref 74–99)
HCT VFR BLD AUTO: 42.3 % (ref 34–48)
HDLC SERPL-MCNC: 52 MG/DL
HGB BLD-MCNC: 13.9 G/DL (ref 11.5–15.5)
IMM GRANULOCYTES # BLD AUTO: 0.03 K/UL (ref 0–0.58)
IMM GRANULOCYTES NFR BLD: 0 % (ref 0–5)
LDLC SERPL CALC-MCNC: 105 MG/DL
LYMPHOCYTES NFR BLD: 1.36 K/UL (ref 1.5–4)
LYMPHOCYTES RELATIVE PERCENT: 20 % (ref 20–42)
MCH RBC QN AUTO: 31.5 PG (ref 26–35)
MCHC RBC AUTO-ENTMCNC: 32.9 G/DL (ref 32–34.5)
MCV RBC AUTO: 95.9 FL (ref 80–99.9)
MONOCYTES NFR BLD: 0.6 K/UL (ref 0.1–0.95)
MONOCYTES NFR BLD: 9 % (ref 2–12)
NEUTROPHILS NFR BLD: 66 % (ref 43–80)
NEUTS SEG NFR BLD: 4.41 K/UL (ref 1.8–7.3)
PLATELET # BLD AUTO: 197 K/UL (ref 130–450)
PMV BLD AUTO: 10.2 FL (ref 7–12)
POTASSIUM SERPL-SCNC: 4.5 MMOL/L (ref 3.5–5.1)
PROT SERPL-MCNC: 7.1 G/DL (ref 6.4–8.3)
RBC # BLD AUTO: 4.41 M/UL (ref 3.5–5.5)
SODIUM SERPL-SCNC: 139 MMOL/L (ref 136–145)
T4 FREE SERPL-MCNC: 1.2 NG/DL (ref 0.9–1.7)
TRIGL SERPL-MCNC: 91 MG/DL
TSH SERPL DL<=0.05 MIU/L-ACNC: 1.45 UIU/ML (ref 0.27–4.2)
VLDLC SERPL CALC-MCNC: 18 MG/DL
WBC OTHER # BLD: 6.7 K/UL (ref 4.5–11.5)

## 2025-06-12 PROCEDURE — 77063 BREAST TOMOSYNTHESIS BI: CPT

## 2025-06-12 PROCEDURE — 84439 ASSAY OF FREE THYROXINE: CPT

## 2025-06-12 PROCEDURE — 84443 ASSAY THYROID STIM HORMONE: CPT

## 2025-06-12 PROCEDURE — 85025 COMPLETE CBC W/AUTO DIFF WBC: CPT

## 2025-06-12 PROCEDURE — 77080 DXA BONE DENSITY AXIAL: CPT

## 2025-06-12 PROCEDURE — 80053 COMPREHEN METABOLIC PANEL: CPT

## 2025-06-12 PROCEDURE — 80061 LIPID PANEL: CPT

## 2025-06-12 PROCEDURE — 36415 COLL VENOUS BLD VENIPUNCTURE: CPT

## 2025-07-10 ENCOUNTER — HOSPITAL ENCOUNTER (OUTPATIENT)
Dept: CT IMAGING | Age: 65
Discharge: HOME | End: 2025-07-10
Payer: COMMERCIAL

## 2025-07-10 DIAGNOSIS — R91.1 LUNG NODULE SEEN ON IMAGING STUDY: Primary | ICD-10-CM

## 2025-07-10 DIAGNOSIS — R52 PAIN: ICD-10-CM

## 2025-07-10 PROCEDURE — 71271 CT THORAX LUNG CANCER SCR C-: CPT

## 2025-07-14 NOTE — PROGRESS NOTES
Cherrie Weems (:  1960) is a 65 y.o. female,Established patient, here for evaluation of the following chief complaint(s):  3 Month Follow-Up (Abnormal CT lung- PET scan scheduled 2025) and Hyperlipidemia      Subjective   SUBJECTIVE/OBJECTIVE:  HPI:  Hyperlipidemia:  Patient is here to follow up regarding chronic hyperlipidemia.  This is  generally controlled.  Treatment includes crestor  Patient is  compliant with lifestyle modifications.  Patient is not a smoker.  Most recent labs reviewed with patient today and are not remarkable.  Comorbid conditions include obesity.    Hypertension:  Patient is here for follow up chronic hypertension.  This is  generally controlled on current medication regimen.    BP Readings from Last 1 Encounters:   25 136/72        Takes meds as directed and tolerates them well.  Most recent labs reviewed with patient and are not remarkable.  No symptoms from htn standpoint per ROS.  Patient is  compliant with lifestyle modifications.  Patient does not smoke.  Comorbid conditions include obesity     Review of Systems   Constitutional:  Negative for activity change, appetite change, fever and unexpected weight change.   HENT:  Negative for congestion, ear pain, hearing loss, sinus pain, sore throat, tinnitus and trouble swallowing.    Eyes:  Negative for photophobia, discharge, itching and visual disturbance.   Respiratory:  Negative for apnea, cough, shortness of breath and wheezing.    Cardiovascular:  Negative for chest pain, palpitations and leg swelling.   Gastrointestinal:  Negative for abdominal distention, abdominal pain, blood in stool, constipation, diarrhea, nausea and vomiting.   Endocrine: Negative for cold intolerance, polydipsia and polyuria.   Genitourinary:  Negative for difficulty urinating, dysuria, frequency and pelvic pain.   Musculoskeletal:  Negative for arthralgias, back pain, joint swelling, myalgias, neck pain and neck stiffness.   Skin:  Negative

## 2025-07-22 ENCOUNTER — OFFICE VISIT (OUTPATIENT)
Dept: PRIMARY CARE CLINIC | Age: 65
End: 2025-07-22
Payer: COMMERCIAL

## 2025-07-22 VITALS
OXYGEN SATURATION: 97 % | TEMPERATURE: 97.8 F | SYSTOLIC BLOOD PRESSURE: 136 MMHG | BODY MASS INDEX: 18.79 KG/M2 | HEART RATE: 68 BPM | HEIGHT: 66 IN | DIASTOLIC BLOOD PRESSURE: 72 MMHG | WEIGHT: 116.9 LBS

## 2025-07-22 DIAGNOSIS — R91.1 LUNG NODULE SEEN ON IMAGING STUDY: ICD-10-CM

## 2025-07-22 DIAGNOSIS — I10 PRIMARY HYPERTENSION: Primary | ICD-10-CM

## 2025-07-22 DIAGNOSIS — E78.2 MIXED HYPERLIPIDEMIA: ICD-10-CM

## 2025-07-22 DIAGNOSIS — M85.89 OSTEOPENIA OF MULTIPLE SITES: ICD-10-CM

## 2025-07-22 PROCEDURE — 3078F DIAST BP <80 MM HG: CPT | Performed by: INTERNAL MEDICINE

## 2025-07-22 PROCEDURE — 4004F PT TOBACCO SCREEN RCVD TLK: CPT | Performed by: INTERNAL MEDICINE

## 2025-07-22 PROCEDURE — G8427 DOCREV CUR MEDS BY ELIG CLIN: HCPCS | Performed by: INTERNAL MEDICINE

## 2025-07-22 PROCEDURE — 99214 OFFICE O/P EST MOD 30 MIN: CPT | Performed by: INTERNAL MEDICINE

## 2025-07-22 PROCEDURE — 1090F PRES/ABSN URINE INCON ASSESS: CPT | Performed by: INTERNAL MEDICINE

## 2025-07-22 PROCEDURE — 3075F SYST BP GE 130 - 139MM HG: CPT | Performed by: INTERNAL MEDICINE

## 2025-07-22 PROCEDURE — G8420 CALC BMI NORM PARAMETERS: HCPCS | Performed by: INTERNAL MEDICINE

## 2025-07-22 PROCEDURE — 3017F COLORECTAL CA SCREEN DOC REV: CPT | Performed by: INTERNAL MEDICINE

## 2025-07-22 PROCEDURE — 1123F ACP DISCUSS/DSCN MKR DOCD: CPT | Performed by: INTERNAL MEDICINE

## 2025-07-22 PROCEDURE — G8399 PT W/DXA RESULTS DOCUMENT: HCPCS | Performed by: INTERNAL MEDICINE

## 2025-07-22 RX ORDER — ERGOCALCIFEROL 1.25 MG/1
50000 CAPSULE, LIQUID FILLED ORAL WEEKLY
Qty: 12 CAPSULE | Refills: 1 | Status: SHIPPED | OUTPATIENT
Start: 2025-07-22

## 2025-07-22 RX ORDER — METOPROLOL SUCCINATE 50 MG/1
TABLET, EXTENDED RELEASE ORAL
Qty: 90 TABLET | Refills: 1 | Status: SHIPPED | OUTPATIENT
Start: 2025-07-22

## 2025-07-22 RX ORDER — ROSUVASTATIN CALCIUM 10 MG/1
10 TABLET, COATED ORAL DAILY
Qty: 90 TABLET | Refills: 1 | Status: SHIPPED | OUTPATIENT
Start: 2025-07-22

## 2025-07-29 DIAGNOSIS — R91.1 LUNG NODULE SEEN ON IMAGING STUDY: Primary | ICD-10-CM

## 2025-08-01 ENCOUNTER — HOSPITAL ENCOUNTER (OUTPATIENT)
Dept: NUCLEAR MEDICINE | Age: 65
Discharge: HOME | End: 2025-08-01
Attending: INTERNAL MEDICINE

## 2025-08-01 DIAGNOSIS — R91.1 LUNG NODULE SEEN ON IMAGING STUDY: ICD-10-CM

## 2025-08-22 ENCOUNTER — HOSPITAL ENCOUNTER (OUTPATIENT)
Dept: NUCLEAR MEDICINE | Age: 65
Discharge: HOME | End: 2025-08-22
Attending: INTERNAL MEDICINE
Payer: COMMERCIAL

## 2025-08-22 PROCEDURE — 3430000000 HC RX DIAGNOSTIC RADIOPHARMACEUTICAL: Performed by: RADIOLOGY

## 2025-08-22 PROCEDURE — 78815 PET IMAGE W/CT SKULL-THIGH: CPT

## 2025-08-22 PROCEDURE — A9609 HC RX DIAGNOSTIC RADIOPHARMACEUTICAL: HCPCS | Performed by: RADIOLOGY

## 2025-08-22 RX ORDER — FLUDEOXYGLUCOSE F 18 200 MCI/ML
10 INJECTION, SOLUTION INTRAVENOUS
Status: COMPLETED | OUTPATIENT
Start: 2025-08-22 | End: 2025-08-22

## 2025-08-22 RX ADMIN — FLUDEOXYGLUCOSE F 18 10 MILLICURIE: 200 INJECTION, SOLUTION INTRAVENOUS at 10:10

## (undated) DEVICE — WIPES SKIN CLOTH READYPREP 9 X 10.5 IN 2% CHLORHEX GLUCONATE CHG PREOP

## (undated) DEVICE — COVER,TABLE,60X90,STERILE: Brand: MEDLINE

## (undated) DEVICE — YANKAUER,BULB TIP,W/O VENT,RIGID,STERILE: Brand: MEDLINE

## (undated) DEVICE — SHEET,DRAPE,40X58,STERILE: Brand: MEDLINE

## (undated) DEVICE — SEALER ENDOSCP NANO COAT OPN DIV CRV L JAW LIGASURE IMPACT

## (undated) DEVICE — SEALER/DIVIDER LAP SHFT L44CM DIA5MM STR BLNT TIP JAW HND

## (undated) DEVICE — COVER,MAYO STAND,STERILE: Brand: MEDLINE

## (undated) DEVICE — 1LYRTR 16FR10ML100%SIL UMS SNP: Brand: MEDLINE INDUSTRIES, INC.

## (undated) DEVICE — BASIC DOUBLE BASIN 2-LF: Brand: MEDLINE INDUSTRIES, INC.

## (undated) DEVICE — 3M™ IOBAN™ 2 ANTIMICROBIAL INCISE DRAPE 6640EZ: Brand: IOBAN™ 2

## (undated) DEVICE — LIQUIBAND RAPID ADHESIVE 36/CS 0.8ML: Brand: MEDLINE

## (undated) DEVICE — INSUFFLATION NEEDLE TO ESTABLISH PNEUMOPERITONEUM.: Brand: INSUFFLATION NEEDLE

## (undated) DEVICE — MARKER,SKIN,WI/RULER AND LABELS: Brand: MEDLINE

## (undated) DEVICE — Device

## (undated) DEVICE — APPLICATOR MEDICATED 26 CC SOLUTION HI LT ORNG CHLORAPREP

## (undated) DEVICE — PUMP SUC IRR TBNG L10FT W/ HNDPC ASSEMB STRYKEFLOW 2

## (undated) DEVICE — STAPLER INT CUT LN 51MM STPL 51MM BLU CRV HD B FRM

## (undated) DEVICE — 40586 ADVANCED TRENDELENBURG POSITIONING KIT: Brand: 40586 ADVANCED TRENDELENBURG POSITIONING KIT

## (undated) DEVICE — 4-PORT MANIFOLD: Brand: NEPTUNE 2

## (undated) DEVICE — PAD,ABDOMINAL,5"X9",ST,LF,25/BX: Brand: MEDLINE INDUSTRIES, INC.

## (undated) DEVICE — STAPLER SKIN L440MM 32MM LNG 12 FIRING B FRM PWR + GRIPPING

## (undated) DEVICE — PLUMEPORT ACTIV LAPAROSCOPIC SMOKE FILTRATION DEVICE: Brand: PLUMEPORT ACTIVE

## (undated) DEVICE — SHEAR HARMONIC 700 VES SEAL 360 DEG SHFT L 36 MM DIA 5 MM JAW L

## (undated) DEVICE — GLOVE ORANGE PI 7 1/2   MSG9075

## (undated) DEVICE — TROCAR: Brand: KII FIOS FIRST ENTRY

## (undated) DEVICE — PITCHER PT 1200ML W HNDL CSR WRP

## (undated) DEVICE — GOWN,SIRUS,NONRNF,SETINSLV,XL,20/CS: Brand: MEDLINE

## (undated) DEVICE — GAUZE,SPONGE,4"X4",16PLY,STRL,LF,10/TRAY: Brand: MEDLINE

## (undated) DEVICE — SUTURE BAG: Brand: DEVON

## (undated) DEVICE — SYRINGE MED 10ML TRNSLUC BRL PLUNG BLK MRK POLYPR CTRL

## (undated) DEVICE — GLOVE ORANGE PI 8   MSG9080

## (undated) DEVICE — SYRINGE IRRIG 60ML SFT PLIABLE BLB EZ TO GRP 1 HND USE W/

## (undated) DEVICE — BLADE,STAINLESS-STEEL,10,STRL,DISPOSABLE: Brand: MEDLINE

## (undated) DEVICE — ACCESS PLATFORM FOR MINIMALLY INVASIVE SURGERY: Brand: GELPOINT® ADVANCED ACCESS PLATFORM

## (undated) DEVICE — BLADE,STAINLESS-STEEL,15,STRL,DISPOSABLE: Brand: MEDLINE

## (undated) DEVICE — STAPLER EXT 65MM S STL AUTO DISP PURSTRING

## (undated) DEVICE — MAGNETIC INSTR DRAPE 20X16: Brand: MEDLINE INDUSTRIES, INC.

## (undated) DEVICE — TUBING, SUCTION, 1/4" X 10', STRAIGHT: Brand: MEDLINE

## (undated) DEVICE — ANCHOR TISSUE RETRIEVAL SYSTEM, BAG SIZE 175 ML, PORT SIZE 10 MM: Brand: ANCHOR TISSUE RETRIEVAL SYSTEM

## (undated) DEVICE — COVER,LIGHT HANDLE,FLX,1/PK: Brand: MEDLINE INDUSTRIES, INC.

## (undated) DEVICE — NEEDLE HYPO 25GA L1.5IN BLU POLYPR HUB S STL REG BVL STR

## (undated) DEVICE — GOWN,SIRUS,FABRNF,XL,20/CS: Brand: MEDLINE

## (undated) DEVICE — GAUZE,SPONGE,4"X4",16PLY,XRAY,STRL,LF: Brand: MEDLINE

## (undated) DEVICE — CLEANER LENS C-CLEAR

## (undated) DEVICE — MEDI-VAC YANKAUER SUCTION HANDLE: Brand: CARDINAL HEALTH

## (undated) DEVICE — BLADE ES ELASTOMERIC COAT INSUL DURABLE BEND UPTO 90DEG

## (undated) DEVICE — SHEET,DRAPE,70X100,STERILE: Brand: MEDLINE

## (undated) DEVICE — SPONGE,DRAIN,NONWVN,4"X4",6PLY,STRL,LF: Brand: MEDLINE

## (undated) DEVICE — RELOAD STPL L75MM OPN H3.8MM CLS 1.5MM WIRE DIA0.2MM REG

## (undated) DEVICE — KIT BEDSIDE REVITAL OX 500ML

## (undated) DEVICE — BINDER ABD UNISX 4 PNL PREM 6INX6INX12IN L XL 4

## (undated) DEVICE — LAPAROSCOPIC WIRE L HK 45 CM

## (undated) DEVICE — RELOAD STPL 40MM H1.5-3.5MM WIRE 0.2MM REG TISS BLU CRV

## (undated) DEVICE — DRESSING HYDROFIBER AQUACEL AG ADVANTAGE 3.5X6 IN

## (undated) DEVICE — RELOAD STPL L60MM H1.5-3.6MM REG TISS BLU GRIPPING SURF B

## (undated) DEVICE — GARMENT,MEDLINE,DVT,INT,CALF,MED, GEN2: Brand: MEDLINE

## (undated) DEVICE — TOWEL,OR,DSP,ST,BLUE,STD,6/PK,12PK/CS: Brand: MEDLINE

## (undated) DEVICE — BLADE ES L6IN ELASTOMERIC COAT EXT DURABLE BEND UPTO 90DEG

## (undated) DEVICE — TOWEL,OR,DSP,ST,GREEN,DLX,XR,4/PK,20PK/C: Brand: MEDLINE

## (undated) DEVICE — COVER,TABLE,44X90,STERILE: Brand: MEDLINE

## (undated) DEVICE — NDL CNTR 40CT FM MAG: Brand: MEDLINE INDUSTRIES, INC.

## (undated) DEVICE — LAPAROSCOPIC SCISSORS: Brand: EPIX LAPAROSCOPIC SCISSORS

## (undated) DEVICE — SET INSUF TUBE HEAT ISO CONN DISP

## (undated) DEVICE — SPONGE LAP W18XL18IN WHT COT 4 PLY FLD STRUNG RADPQ DISP ST 2 PER PACK

## (undated) DEVICE — TROCAR: Brand: KII SLEEVE

## (undated) DEVICE — APPLIER CLP M/L SHFT DIA5MM 15 LIG LIGAMAX 5

## (undated) DEVICE — ELECTRODE PT RET AD L9FT HI MOIST COND ADH HYDRGEL CORDED

## (undated) DEVICE — PACK SURG LAP CHOLE CUSTOM

## (undated) DEVICE — SPONGE,PEANUT,XRAY,ST,SM,3/8",5/CARD: Brand: MEDLINE INDUSTRIES, INC.

## (undated) DEVICE — YANKAUER,POOLE TIP,STERILE,50/CS: Brand: MEDLINE

## (undated) DEVICE — RELOAD STPL L60MM H1-2.6MM MESENTERY THN TISS WHT 6 ROW

## (undated) DEVICE — AGENT HEMSTAT W2XL4IN OXIDIZED REGENERATED CELOS ABSRB